# Patient Record
Sex: FEMALE | Race: WHITE | Employment: FULL TIME | URBAN - METROPOLITAN AREA
[De-identification: names, ages, dates, MRNs, and addresses within clinical notes are randomized per-mention and may not be internally consistent; named-entity substitution may affect disease eponyms.]

---

## 2019-10-17 ENCOUNTER — OFFICE VISIT (OUTPATIENT)
Dept: OBGYN CLINIC | Age: 24
End: 2019-10-17
Payer: COMMERCIAL

## 2019-10-17 VITALS
TEMPERATURE: 97.5 F | RESPIRATION RATE: 18 BRPM | WEIGHT: 255 LBS | DIASTOLIC BLOOD PRESSURE: 76 MMHG | SYSTOLIC BLOOD PRESSURE: 134 MMHG | HEIGHT: 57 IN | HEART RATE: 100 BPM | BODY MASS INDEX: 55.02 KG/M2

## 2019-10-17 DIAGNOSIS — E28.2 PCO (POLYCYSTIC OVARIES): ICD-10-CM

## 2019-10-17 DIAGNOSIS — N94.6 DYSMENORRHEA: Primary | ICD-10-CM

## 2019-10-17 DIAGNOSIS — N92.0 MENORRHAGIA WITH REGULAR CYCLE: ICD-10-CM

## 2019-10-17 DIAGNOSIS — R19.7 DIARRHEA, UNSPECIFIED TYPE: ICD-10-CM

## 2019-10-17 PROCEDURE — 99203 OFFICE O/P NEW LOW 30 MIN: CPT | Performed by: CLINICAL NURSE SPECIALIST

## 2019-10-17 RX ORDER — IBUPROFEN 800 MG/1
TABLET ORAL
Qty: 30 TABLET | Refills: 2 | Status: SHIPPED | OUTPATIENT
Start: 2019-10-17 | End: 2022-02-18 | Stop reason: ALTCHOICE

## 2019-10-17 ASSESSMENT — ENCOUNTER SYMPTOMS
DIARRHEA: 1
EYES NEGATIVE: 1
ABDOMINAL PAIN: 1
RESPIRATORY NEGATIVE: 1
ALLERGIC/IMMUNOLOGIC NEGATIVE: 1

## 2021-02-19 ENCOUNTER — HOSPITAL ENCOUNTER (OUTPATIENT)
Age: 26
Discharge: HOME | End: 2021-02-19

## 2021-02-19 PROCEDURE — 86480 TB TEST CELL IMMUN MEASURE: CPT

## 2021-02-19 PROCEDURE — 86787 VARICELLA-ZOSTER ANTIBODY: CPT

## 2021-02-19 PROCEDURE — 36415 COLL VENOUS BLD VENIPUNCTURE: CPT

## 2021-02-22 LAB
QUANTI TB GOLD PLUS: NEGATIVE
QUANTI TB1 MINUS NIL: 0 IU/ML (ref 0–0.34)
QUANTI TB2 MINUS NIL: 0 IU/ML (ref 0–0.34)
QUANTIFERON MITOGEN: 8.2 IU/ML
QUANTIFERON NIL: 0.03 IU/ML
VZV IGG SER QL IA: 1.18

## 2021-05-18 ENCOUNTER — HOSPITAL ENCOUNTER (OUTPATIENT)
Age: 26
Setting detail: SPECIMEN
Discharge: HOME | End: 2021-05-18

## 2021-05-18 LAB — HCG QUANTITATIVE: <1 IU/L

## 2021-10-19 ENCOUNTER — TELEPHONE (OUTPATIENT)
Dept: GASTROENTEROLOGY | Age: 26
End: 2021-10-19

## 2021-10-21 ENCOUNTER — OFFICE VISIT (OUTPATIENT)
Dept: OBGYN CLINIC | Age: 26
End: 2021-10-21
Payer: COMMERCIAL

## 2021-10-21 VITALS
BODY MASS INDEX: 56.52 KG/M2 | SYSTOLIC BLOOD PRESSURE: 130 MMHG | HEIGHT: 57 IN | WEIGHT: 262 LBS | DIASTOLIC BLOOD PRESSURE: 82 MMHG | HEART RATE: 93 BPM

## 2021-10-21 DIAGNOSIS — N97.9 PRIMARY FEMALE INFERTILITY: Primary | ICD-10-CM

## 2021-10-21 PROCEDURE — 99202 OFFICE O/P NEW SF 15 MIN: CPT | Performed by: OBSTETRICS & GYNECOLOGY

## 2021-10-21 NOTE — PROGRESS NOTES
aMrilu comes to the office today as a new visit patient to discuss fertility issues. She is  and states that she and her  have been trying to conceive unsuccessfully for the past 3 years. Many years ago she was diagnosed with PCO/IR syndrome and at 1 point was placed on Metformin. She has been on various forms of birth control from Depo-Provera, Nexplanon and the last 1 was a progesterone IUD which was removed 2 to 3 years ago. She is having menstrual cycles that are occurring 28 to 35 days and lasting 5 days. She was using an ovulation phone rosa. She does have an appointment with a fertility center in [place] next month. At this point she is unsure as to whether or not she will keep that appointment? We had a lengthy discussion regarding the accuracy of the diagnosis of PCO/IR since she is having cycles with varying intervals. We also discussed the inaccuracy of using a phone rosa because of that. Also discussed with the use of Metformin versus Clomid alone. Studies have shown that the pregnancy rate is superior with using Clomid versus Metformin and using Clomid independently. 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 After discussion she does desire to proceed with using Clomid and a prescription was sent to her pharmacy. She will follow-up in 3 to 4 months at her annual exam or as needed. Diego Schroeder MD,Mph, Oscar Carbajal.   -[facility]

## 2021-11-02 ENCOUNTER — TELEPHONE (OUTPATIENT)
Dept: OBGYN CLINIC | Age: 26
End: 2021-11-02

## 2021-11-03 NOTE — TELEPHONE ENCOUNTER
Patient called back stating that she started her period yesterday. However it is not like a normal period. It is very light with brown discharge. She is not sure if she should start taking the clomid now or wait until she has a heavy flow.  Patient asking for a call back with what she should do

## 2021-11-11 ENCOUNTER — TELEPHONE (OUTPATIENT)
Dept: OBGYN CLINIC | Age: 26
End: 2021-11-11

## 2021-11-11 NOTE — TELEPHONE ENCOUNTER
Called and stated she has cramping Her LMP was 11/03/21 and started  clomid on 11/06/21 for 5 days  told her that can be normal she may be trying to ovulate told her she can take tylenol for pain she stated she has fert.  Doctor appt next week told her to call back if things change

## 2021-11-17 ENCOUNTER — TELEPHONE (OUTPATIENT)
Dept: OBGYN CLINIC | Age: 26
End: 2021-11-17

## 2021-11-17 NOTE — TELEPHONE ENCOUNTER
Pt called she is on clomid and this month she is having cramping and pressure and had a positive ovulation test she just wants to know if this is normal it also hurts when she laughs

## 2022-01-16 NOTE — PROGRESS NOTES
[address] [facility] - [address] [address] [place] [facility]\"" [address]  Dept: [phone]  Dept Fax: [phone]    22    Chief Complaint   Patient presents with    Follow-up     Discuss PCOS and getting pregnant        Torie Pearson 23 y.o. is here to discuss fertility. She met with Dr Russell Osborne previously and was started on Clomid. She says that he told her she didn't have PCOS and she is confused and wanted to talk about what she has and what that means. She just wants to talk about the POC to try to conceive and have a clear path. She works for a bariatric surgeon and is going to talk to them about possibly a sleeve in the future. She is starting to have more regular periods with the clomid. She stopped taking it this month because it was causing pain in her ovaries. She has been following with Dr Teetee Rogers who said she has elevated testosterone, as well as acne, and agrees that she has PCOS. She has tried metformin in the past, but had stomach issues and stopped it. She did have irregular periods previously, but is having them monthly now. Review of Systems   Constitutional: Negative for chills and fever. HENT: Negative for congestion. Respiratory: Negative for cough and shortness of breath. Cardiovascular: Negative for chest pain and palpitations. Gastrointestinal: Negative for abdominal pain. Genitourinary: Negative for dyspareunia, pelvic pain and vaginal discharge. Musculoskeletal: Negative for back pain. Neurological: Negative for dizziness and light-headedness. Psychiatric/Behavioral: The patient is not nervous/anxious. Gynecologic History  Patient's last menstrual period was 2022.   Contraception: none  Last Pap: 19  Results: normal  Last Mammogram: n/a    Obstetric History  : 0  Para: 0  AB: 0    Past Medical History:   Diagnosis Date    HTN (hypertension)     PCOS (polycystic ovarian syndrome)      History reviewed. No pertinent surgical history. No Known Allergies  Current Outpatient Medications   Medication Sig Dispense Refill    Multiple Vitamin (MULTIVITAMIN ADULT PO) Take by mouth      clomiPHENE (CLOMID) 50 MG tablet Take 1 tablet by mouth daily Take 1 tablet by mouth daily starting day 3 of menses for 5 days 30 tablet 3    ibuprofen (ADVIL;MOTRIN) 800 MG tablet Begin 1 day before menses begins. Take one tablet every 8 hours for 5 days. (Patient not taking: Reported on 10/21/2021) 30 tablet 2     No current facility-administered medications for this visit. Social History     Socioeconomic History    Marital status:      Spouse name: Not on file    Number of children: Not on file    Years of education: Not on file    Highest education level: Not on file   Occupational History    Not on file   Tobacco Use    Smoking status: Never Smoker    Smokeless tobacco: Never Used   Substance and Sexual Activity    Alcohol use: Yes     Comment: rare    Drug use: Never    Sexual activity: Yes     Partners: Male     Birth control/protection: I.U.D. Other Topics Concern    Not on file   Social History Narrative    Not on file     Social Determinants of Health     Financial Resource Strain:     Difficulty of Paying Living Expenses: Not on file   Food Insecurity:     Worried About Running Out of Food in the Last Year: Not on file    Jose of Food in the Last Year: Not on file   Transportation Needs:     Lack of Transportation (Medical): Not on file    Lack of Transportation (Non-Medical):  Not on file   Physical Activity:     Days of Exercise per Week: Not on file    Minutes of Exercise per Session: Not on file   Stress:     Feeling of Stress : Not on file   Social Connections:     Frequency of Communication with Friends and Family: Not on file    Frequency of Social Gatherings with Friends and Family: Not on file    Attends Orthodox Services: Not on file    Active Member of Clubs or Organizations: Not on file    Attends Club or Organization Meetings: Not on file    Marital Status: Not on file   Intimate Partner Violence:     Fear of Current or Ex-Partner: Not on file    Emotionally Abused: Not on file    Physically Abused: Not on file    Sexually Abused: Not on file   Housing Stability:     Unable to Pay for Housing in the Last Year: Not on file    Number of [place] in the Last Year: Not on file    Unstable Housing in the Last Year: Not on file     History reviewed. No pertinent family history. Physical exam Physical Exam  Constitutional:       Appearance: Normal appearance. She is obese. HENT:      Head: Normocephalic. Eyes:      Extraocular Movements: Extraocular movements intact. Pulmonary:      Effort: Pulmonary effort is normal.   Neurological:      Mental Status: She is alert and oriented to person, place, and time. Psychiatric:         Mood and Affect: Mood normal.         Behavior: Behavior normal.         Thought Content: Thought content normal.         Judgment: Judgment normal.         Assessment/Plan  1. PCO (polycystic ovaries)  - Discussed diagnosis of PCOS based on oligo/amenorrhea, ultrasound findings and hyperandrogenism. Reviewed implications of the disease including increased risk for insulin resistance/DM, lipid abnormalities and cardiovascular disease, as well as GYN specific effects (possible infertility, oligomenorrhea, increased risk of endometrial cancer). Encouraged weight loss with diet and exercise. Reviewed medical management options for endometrial protection, including side effect profiles and dosing regimens. All questions answered. Patient desires to proceed with discussion with her bariatric surgeon at this time to decide what she would like to do.      2. Morbid obesity with BMI of 50.0-59.9, adult ([facility] [address]  - Encouraged pt to exercise and diet to lose weight to increase her chances of success with infertility management.       Pt to follow up in 6 months to [facility]

## 2022-01-17 ENCOUNTER — TELEPHONE (OUTPATIENT)
Dept: BARIATRICS/WEIGHT MGMT | Age: 27
End: 2022-01-17

## 2022-01-17 ENCOUNTER — OFFICE VISIT (OUTPATIENT)
Dept: OBGYN CLINIC | Age: 27
End: 2022-01-17
Payer: COMMERCIAL

## 2022-01-17 ENCOUNTER — VIRTUAL VISIT (OUTPATIENT)
Dept: GASTROENTEROLOGY | Age: 27
End: 2022-01-17
Payer: COMMERCIAL

## 2022-01-17 VITALS
HEIGHT: 58 IN | DIASTOLIC BLOOD PRESSURE: 89 MMHG | HEART RATE: 98 BPM | WEIGHT: 258 LBS | SYSTOLIC BLOOD PRESSURE: 134 MMHG | BODY MASS INDEX: 54.16 KG/M2

## 2022-01-17 DIAGNOSIS — R19.4 ALTERED BOWEL HABITS: Primary | ICD-10-CM

## 2022-01-17 DIAGNOSIS — R10.9 CHRONIC ABDOMINAL PAIN: ICD-10-CM

## 2022-01-17 DIAGNOSIS — E28.2 PCO (POLYCYSTIC OVARIES): Primary | ICD-10-CM

## 2022-01-17 DIAGNOSIS — G89.29 CHRONIC ABDOMINAL PAIN: ICD-10-CM

## 2022-01-17 DIAGNOSIS — E66.01 MORBID OBESITY WITH BMI OF 50.0-59.9, ADULT (HCC): ICD-10-CM

## 2022-01-17 DIAGNOSIS — R11.0 NAUSEA: ICD-10-CM

## 2022-01-17 PROCEDURE — 99422 OL DIG E/M SVC 11-20 MIN: CPT | Performed by: INTERNAL MEDICINE

## 2022-01-17 PROCEDURE — 99213 OFFICE O/P EST LOW 20 MIN: CPT | Performed by: OBSTETRICS & GYNECOLOGY

## 2022-01-17 RX ORDER — PANTOPRAZOLE SODIUM 40 MG/1
40 TABLET, DELAYED RELEASE ORAL
Qty: 30 TABLET | Refills: 5 | Status: SHIPPED | OUTPATIENT
Start: 2022-01-17 | End: 2022-11-03 | Stop reason: ALTCHOICE

## 2022-01-17 ASSESSMENT — ENCOUNTER SYMPTOMS
COUGH: 0
SHORTNESS OF BREATH: 0
ALLERGIC/IMMUNOLOGIC NEGATIVE: 1
CONSTIPATION: 1
ABDOMINAL DISTENTION: 1
ABDOMINAL PAIN: 1
ABDOMINAL PAIN: 0
BACK PAIN: 0
DIARRHEA: 1
RESPIRATORY NEGATIVE: 1
NAUSEA: 1

## 2022-01-17 NOTE — PROGRESS NOTES
Reason for Referral:   No referring provider defined for this encounter. Chief Complaint   Patient presents with    New Patient     nausea, abd pain, bloating            HISTORY OF PRESENT ILLNESS: Yasir Holman is a 23 y.o. female , referred for evaluation of abdominal pain. Nausea and vomiting. Altered bowel habits. She has had issues with this for several years. She underwent evaluation at [facility]. They were planning to do endoscopic evaluation due to insurance issues that could not happen. Over the past couple of months her symptoms have got worse. She reports cramping. Nausea. No vomiting. Altered bowel habits with some loose bowel movements. No clear triggers. She has been trying some dietary changes with minimal.  She reports family history of IBS. No colon cancer or IBD in the family. She reports no smoking or drinking. She is planning to go for bariatric surgery she reports no prior abdominal surgeries. Past Medical,Family, and Social History reviewed and does not contribute to the patient presenting condition. Patient's PMH/PSH,SH,PSYCH Hx, MEDs, ALLERGIES, and ROS were all reviewed and updated in the appropriate sections. PAST MEDICAL HISTORY:  Past Medical History:   Diagnosis Date    HTN (hypertension)     PCOS (polycystic ovarian syndrome)        History reviewed. No pertinent surgical history. CURRENT MEDICATIONS:    Current Outpatient Medications:     Multiple Vitamin (MULTIVITAMIN ADULT PO), Take by mouth, Disp: , Rfl:     clomiPHENE (CLOMID) 50 MG tablet, Take 1 tablet by mouth daily Take 1 tablet by mouth daily starting day 3 of menses for 5 days, Disp: 30 tablet, Rfl: 3    ibuprofen (ADVIL;MOTRIN) 800 MG tablet, Begin 1 day before menses begins. Take one tablet every 8 hours for 5 days. , Disp: 30 tablet, Rfl: 2    ALLERGIES:   No Known Allergies    FAMILY HISTORY: History reviewed. No pertinent family history.       SOCIAL HISTORY:   Social History     Socioeconomic History    Marital status:      Spouse name: Not on file    Number of children: Not on file    Years of education: Not on file    Highest education level: Not on file   Occupational History    Not on file   Tobacco Use    Smoking status: Never Smoker    Smokeless tobacco: Never Used   Substance and Sexual Activity    Alcohol use: Yes     Comment: rare    Drug use: Never    Sexual activity: Yes     Partners: Male     Birth control/protection: I.U.D. Other Topics Concern    Not on file   Social History Narrative    Not on file     Social Determinants of Health     Financial Resource Strain:     Difficulty of Paying Living Expenses: Not on file   Food Insecurity:     Worried About Running Out of Food in the Last Year: Not on file    Jose of Food in the Last Year: Not on file   Transportation Needs:     Lack of Transportation (Medical): Not on file    Lack of Transportation (Non-Medical):  Not on file   Physical Activity:     Days of Exercise per Week: Not on file    Minutes of Exercise per Session: Not on file   Stress:     Feeling of Stress : Not on file   Social Connections:     Frequency of Communication with Friends and Family: Not on file    Frequency of Social Gatherings with Friends and Family: Not on file    Attends Congregational Services: Not on file    Active Member of [address] [place] [address] [employer] or Organizations: Not on file    Attends Club or Organization Meetings: Not on file    Marital Status: Not on file   Intimate Partner Violence:     Fear of Current or Ex-Partner: Not on file    Emotionally Abused: Not on file    Physically Abused: Not on file    Sexually Abused: Not on file   Housing Stability:     Unable to Pay for Housing in the Last Year: Not on file    Number of [place] in the Last Year: Not on file    Unstable Housing in the Last Year: Not on file       REVIEW OF SYSTEMS: A 12-point review of systemswas obtained and pertinent positives and negatives were enumerated above in the history of present illness. All other reviewed systems / symptoms were negative. Review of Systems   Constitutional: Positive for appetite change. HENT: Negative. Eyes: Positive for visual disturbance (glasses ). Respiratory: Negative. Cardiovascular: Negative. Gastrointestinal: Positive for abdominal distention, abdominal pain, constipation, diarrhea and nausea. Endocrine: Negative. Genitourinary: Negative. Musculoskeletal: Negative. Skin: Negative. Allergic/Immunologic: Negative. Neurological: Negative. Hematological: Negative. Psychiatric/Behavioral: Negative. LABORATORY DATA: Reviewed  No results found for: WBC, HGB, HCT, MCV, PLT, NA, K, CL, CO2, BUN, CREATININE, LABPROT, LABALBU, GGT, BILITOT, ALKPHOS, AST, ALT, INR      No results found for: RBC, HGB, MCV, MCH, MCHC, RDW, MPV, BASOPCT, LYMPHSABS, MONOSABS, NEUTROABS, EOSABS, BASOSABS      DIAGNOSTIC TESTING:     No results found. [facility] 01/02/2022     PHYSICAL EXAMINATION: Vital signs reviewed per the nursing documentation. There is no height or weight on file to calculate BMI. Physical Exam      I personally reviewed the nurse's notes and documentation and I agree with her notes. Phone visit due to recent COVID infection    IMPRESSION: Ms. Bernardino Grover is a 23 y.o. female with altered bowel habits. Abdominal pain. Nausea. Very likely functional.  Trial of align and and IBgard. Dietary changes. Follow-up in 1 month. If no significant improvement she may benefit from colonoscopy with biopsies. She is planned for EGD by bariatric surgery prior to her possible sleeve gastrectomy. She would benefit from gastric and duodenal biopsies during that time. We will see her back in 1 month. Spent 30 minutes providing patient education and counseling. Thank you for allowing me to participate in the care of Ms. Fermin.  For any further questions please do not hesitate to contact me. I have reviewed and agree with the MA/LPN ROS. Note is dictated utilizing voice recognition software. Unfortunately this leads to occasional typographical errors. Please contact our office if you have any questions.     Gilles Boast, MD  [facility]  O: #[phone]

## 2022-01-17 NOTE — TELEPHONE ENCOUNTER
Online Info Session Completed:  on 1/10/22 okay to schedule with Dr. Jose Elias Pierre   with robi    Patient informed the following: This is NOT a guarantee of payment  When stating that you have a Benefit or Coverage for Bariatric Surgery - that means that you may qualify for the surgery  Bariatric Surgery is considered an elective procedure, patient is responsible to know their benefits . Any information we obtain when calling your insurance  is not  a guarantee of  coverage  and/or  benefit. Appointment Note :   New Patient , robi,   3   month visits,  PG Fee $200,  Mailed Packet or advised to arrive  early      Remind Patient of $200 Program fee with $ 100 required at Second visit with office on initial dietician visit. Remind Patient they must be nicotine free. They will be tested at the beginning of the program and prior to surgery. Advise Patient Responsible for out of pocket, copay at medical visits, Deductible and coinsurance applied to medical visits and procedure. You will be responsible for any of the following:  · Copays   · Deductibles 1000.00  · Co insurances 2000.00    The items mentioned above are indicated or required by your insurance plan. Your deductible and coinsurance are applied to medical visits and procedures. Verified with patient if he or she has had any previous bariatric surgery? no  ( If yes ,advise patient of transfer of care process and program fee)       .

## 2022-01-18 ENCOUNTER — OFFICE VISIT (OUTPATIENT)
Dept: BARIATRICS/WEIGHT MGMT | Age: 27
End: 2022-01-18
Payer: COMMERCIAL

## 2022-01-18 ENCOUNTER — TELEPHONE (OUTPATIENT)
Dept: GASTROENTEROLOGY | Age: 27
End: 2022-01-18

## 2022-01-18 ENCOUNTER — NURSE ONLY (OUTPATIENT)
Dept: BARIATRICS/WEIGHT MGMT | Age: 27
End: 2022-01-18

## 2022-01-18 VITALS
SYSTOLIC BLOOD PRESSURE: 160 MMHG | BODY MASS INDEX: 55.23 KG/M2 | HEART RATE: 94 BPM | DIASTOLIC BLOOD PRESSURE: 109 MMHG | HEIGHT: 57 IN | WEIGHT: 256 LBS

## 2022-01-18 DIAGNOSIS — R06.09 DYSPNEA ON EXERTION: ICD-10-CM

## 2022-01-18 DIAGNOSIS — K21.9 GASTROESOPHAGEAL REFLUX DISEASE WITHOUT ESOPHAGITIS: Primary | ICD-10-CM

## 2022-01-18 DIAGNOSIS — E66.01 MORBID OBESITY WITH BMI OF 50.0-59.9, ADULT (HCC): ICD-10-CM

## 2022-01-18 PROCEDURE — 99204 OFFICE O/P NEW MOD 45 MIN: CPT | Performed by: SURGERY

## 2022-01-18 NOTE — LETTER
Visit Date: 1/18/2022    Patient: Ronn Essex  YOB: 1995    Dear Dr. Ana Laura Hurt MD,      I had the pleasure of seeing Jamse Claros in the office today for a consult for weight loss surgery. Her current Weight: 256 lb (116.1 kg), which gives her a Body mass index is 55.4 kg/m². .  We had a long discussion regarding surgical options for weight loss and improvement in co-morbidities. She is considering a bariatric  procedure. We will start the preoperative workup, which includes bloodwork, psychological evaluation, support group attendance, and preoperative medical clearance from you. We will also initiate pre-certification for surgery, which requires a letter of medical necessity from you and often office notes documenting a weight history and co-morbidities. James Claros will require 3 months of medically supervised visits as specified by the patient's insurance. Thank you for allowing me to participate in the care of your patient. If you have any questions or concerns, please do not hesitate to call.       Sincerely,     Silvia Long DO  Director of Bariatric and Minimally Invasive Surgery  [facility]  [address] [facility] [address] [facility]  Monica Velardea: [phone]  F: [phone]

## 2022-01-19 NOTE — PROGRESS NOTES
Medical Nutrition Therapy  Initial Nutrition Assessment for Metabolic/ Bariatric Surgery  Required insurance visit prior to surgery:  3  Shared with patient the importance of documenting exercise and staying at or below start weight during visits. Zina Llamas is a 23 y.o. female with a date of birth of 1995. Weight History: Wt Readings from Last 3 Encounters:   01/18/22 256 lb (116.1 kg)   01/17/22 258 lb (117 kg)   10/21/21 262 lb (118.8 kg)        How does your weight affect your daily activities?fatigue      What would be different in your life if you felt healthier and fit? Why is that important to you now? I want to have a healthy pregnancy  Do you drink alcohol? . YES, occasionally    Do you use tobacco in the form of cigarettes, cigars, chew or any vapor appliance? No    Weight History      Siddharth Fermin's highest adult weight was 260 lbs at age 23. Patient was at her highest weight for 4 years. Patient's triggers/known causes to her highest weight are eating, pcos, poor diet, and eating out. Siddharth Fermin's lowest adult weight was 190 lbs at age 24. Patient was at her lowest weight for 1 years. The lowest weight was achieved through active at job, eating 2 meals a day and school/work. .     Physical Activity  Do you participate in a structured exercise program, step counting or regular physical activity? no      Instructions and exercise logs were provided to patient today see goal sheet and plan. Previous weight loss attempts  Patient has participated in the following weight loss programs:   [employer] and self directed calorie restriction      Nutrition History  Have you ever been diagnosed with an eating disorder? No  Have you ever had problems tolerating a multivitamin or mineral supplement?no  Have you ever been diagnosed with a vitamin or mineral deficiency? no     Patient dines out to a sit down restaurant 2 times per week.     Patient dines out to a fast food restaurant 1 times per week. Patient does have grazing. Patient does have night eating. Patient does have a history of emotional eating. Patient does have a history of  eating out of boredom. Drinks throughout the day: coffee, water, sweet tea and unsweetened tea    24 hour recall/food frequency: has been scanned into chart unless completed below. Assessment:  Nutritional Needs:  Men: 1500kcal daily minimum     Women 1200kcal daily minimum. 60-80gm of protein daily  PES Statement:  Obesity related to a complex combination of decreased energy needs, disordered eating patterns, physical inactivity, and increased psychological/life stress as evidenced by BMI greater than 30 and inability to maintain a significant amount of weight loss through conventional weight loss interventions. Goals    All goals were planned with and agreed on by the patient. I want to improve my health because I want to be healthier and get pregnant. appt # NA G What is your next step? C 1 2 3 4 5 6 7 8 9       1 I will read the education binder provided to me and the   x              0  2 I will make my pschological evaluation appoinment. 0  3 I will bring this goal card to every appointment. x 4 I will eliminate all tobacco/nicotine. x 5 I will limit alcoholic beverages to 0-7MO per week. x 6 I will limit dining out to 3 times per week or less. 7 I will eliminate sugary beverages. 8 I will eliminate carbonated beverages. 9 I will eliminate drinking with a straw. 10 I will limit caffeinated beverages to 16oz daily. 0  11 I will limit log my exercise daily. 12 I will determine my calcium and mvi plan.                  13 I will have 1-2 servings of lean protein present at each meal and minimeal.               0  14 I will eat every 3-5 hours. 0  15 I will drink 64oz of fluid daily. 16 I will eat slowly during meals and snacks. 17 I will limit fluids 4oz before after and during meals. 18 I will eat protein first at all meals followed by vegetables,  Fruit and lastly whole grains. 23 My first weight neutral approach is:                 20 My second weight neutral approach is:                 21  My Thirds weight neutral approach is:                 22                  23                  24                  25                  Goals reviewed with patient as below  Do you understand your goals? Do you have the information you need to achieve your goals? Do you have any questions  right now? Plan    Exercise for Health 15 easy exercises to do at home with 6 activity logs were provided to the patient with verbal and written instructions on how to carry this out. Goal number 14 was provided to the patient on this visit please see above. Will follow up each month and provide support as patient begins to add physical activity to life style. Monitor and review goals adjust as needed. Follow up monthly supervised diet and exercise.        Moo Carrillo, MS, RD, LD

## 2022-01-25 NOTE — PROGRESS NOTES
[address] [facility] INVASIVE BARIATRIC SURG  [address] [facility]  [address] [place] [id number]  Dept: [phone]    SURGICAL WEIGHT MANAGEMENT PROGRAM  PROGRESS NOTE INITIAL EVALUATION     Patient: Vanessa Sena        Service Date: 1/18/2022     HPI:     Chief Complaint   Patient presents with    Bariatric, Initial Visit    Weight Loss       The patient is a pleasant 23y.o. year old female  with morbid obesity, who stands Height: 4' 9\" (144.8 cm) tall with a weight of Weight: 256 lb (116.1 kg) , resulting in a BMI of Body mass index is 55.4 kg/m². . The patient suffers from multiple co-morbidities as a result of morbid obesity, including: Dyspnea on Exertion and GERD. She has suffered from obesity for many years. The patient denies  a history of myocardial infarction, deep vein thrombosis, pulmonary embolism, renal failure, hepatic failure and stroke. The patient has failed multiple attempts at non-surgical weight loss, and is now seeking surgical intervention to promote permanent and consistent weight loss. She  has chosen Sleeve Gastrectomy. She is well educated regarding it, as she has recently viewed our weight loss surgery informational seminar . Medical History:  Past Medical History:   Diagnosis Date    HTN (hypertension)     PCOS (polycystic ovarian syndrome)        Surgical History:  No past surgical history on file. Family History:  No family history on file.     Social History:   Social History     Tobacco Use    Smoking status: Never Smoker    Smokeless tobacco: Never Used   Substance Use Topics    Alcohol use: Yes     Comment: rare    Drug use: Never       Current Med List:  Current Outpatient Medications   Medication Sig Dispense Refill    pantoprazole (PROTONIX) 40 MG tablet Take 1 tablet by mouth every morning (before breakfast) 30 tablet 5    Multiple Vitamin (MULTIVITAMIN ADULT PO) Take by mouth      clomiPHENE (CLOMID) 50 MG tablet Take 1 tablet by mouth daily Take 1 tablet by mouth daily starting day 3 of menses for 5 days (Patient not taking: Reported on 1/18/2022) 30 tablet 3    ibuprofen (ADVIL;MOTRIN) 800 MG tablet Begin 1 day before menses begins. Take one tablet every 8 hours for 5 days. (Patient not taking: Reported on 1/18/2022) 30 tablet 2     No current facility-administered medications for this visit. No Known Allergies      SOCIAL:      This patient is alone for the evaluation today. [] HIV Risk Factors (i.e.) intravenous drug abuser; at risk sexual behavior; received blood products    [] TB Risk Factors (i.e.) Medically underserved, institutional care, foreign born, endemic area; exposure to active case    [] Hepatitis B&C Risk Factors (i.e.) Received blood transfusion prior to 1992; recreational drug use; high risk sexual behaviors; tattoos or body piercings; contact with blood or needle sticks in the workplace    Comprehension    Ability to grasp concepts and respond to questions:   [x] High   [] Medium   [] Low    Motivation    [x] Asks Questions; eager to learn   [] Needs education   [] Extreme anxiety    [] uncooperative   [] Denies need for education    English Speaking Ability    [x] Speaks English well   [x] Reads English well   [x] Understands spoken english    [x] Understands written English   [] No need for interpretive support      [] Might benefit from interpretive support   []  required for all services     REVIEW OF SYSTEMS: (Negative unless marked otherwise)     See review of Systems scanned into media    PRESENT ILLNESS:     Weight Parameters  Weight 256 lb (116.1 kg)   Height 4' 9\" (1.448 m)   BMI Body mass index is 55.4 kg/m².    IBW     EBW               IMMUNIZATION STATUS  Immunization History   Administered Date(s) Administered    COVID-19, Aurther Dyers, Primary or Immunocompromised, PF, 100mcg/0.5mL 12/30/2020, 05/06/2021       FALLS ASSESSMENT    [] LOW RISK FOR FALLS [] MODERATE RISK FOR FALLS    [] Difficulty walking/selfcare    [] Falls in the past 2 months    [] Suspicion of Clinician    [] Other:      SMOKING CESSATION     [] Not needed     [] Instructed to stop smoking    [] Pamphlet community resources given     VTE SCREEN    [] Family hx DVT/PE  /   [] Personal hx of DVT/PE    [x] Denies any family or personal hx of DVT/PE    Physician Review    [x] Past medical, family, & social history reviewed and discussed with patient. Review of surgery and post-surgical changes (by surgeon for surgical patients only)    [x] Lifelong diet expectations reviewed with patient    [x] Need for lifelong vitamin supplementation reviewed with patient    PHYSICAL EXAMINATION:      BP (!) 160/109   Pulse 94   Ht 4' 9\" (1.448 m)   Wt 256 lb (116.1 kg)   LMP 01/02/2022   BMI 55.40 kg/m²     Constitutional:  Vital signs are normal. The patient appears well-developed   HEENT:      Head: Normocephalic. Atraumatic     Eyes: pupils are equal and reactive. No scleral icterus is present. Neck: No mass and no thyromegaly present. Cardiovascular: Normal rate, regular rhythm, S1 normal and S2 normal.  Bilateral pulses present. Pulmonary/Chest: Effort normal and breath sounds normal. No retractions. Abdominal: Soft. Normal appearance. There is no organomegaly. No tenderness. There is no rigidity, no rebound, no guarding and no Hook's sign. Musculoskeletal:      Right lower leg: Normal. No tenderness and no edema. Left lower leg: Normal. No tenderness and no edema. Lymphadenopathy:     No cervical adenopathy, No Exrtemity Adenopathy. Neurological: The patient is alert and oriented. Moving all four extremities equally, sensation grossly intact bilateral.  Skin: Skin is warm, dry and intact. Psychiatric: The patient has a normal mood and affect.  Speech is normal and behavior is normal. Judgment and thought content normal. Cognition and memory are normal.     RECOMMENDATIONS: We spent a great deal of time discussing the risks and benefits of Sleeve Gastrectomy, including but not limited to injury to intra-abdominal organs, breakdown of the gastric staple line, the need for re-operative therapy,  prolonged hospitalization,  mechanical ventilation,  and death. We discussed the possibility of bleeding, the need for blood transfusions, blood clots, hospital-acquired and intra-abdominal infection, anastomotic stricture, and worsening GERD. And we discussed the need for post-operative visit compliance, behavior modifications and diet changes, protein and vitamin supplementation, as well as routine scheduled and dedicated exercise. I instructed the patient to utilize the exercise log that will be given to them at their fist dietician appointment. We discussed the potential weight loss benefit of approximately 50-60% of her excess body weight at 12-18 months post-op, as well as the possibility of insufficient weight loss or weight gain after 2 years post-operative time. Discussed the risk of substance abuse and or nicotine abuse today with patient. They expressed understanding of the risks of abuse of such drugs. PLAN:       Diagnosis Orders   1. Gastroesophageal reflux disease without esophagitis     2. Dyspnea on exertion     3. Morbid obesity with BMI of 50.0-59.9, adult (HCC)            Initial Testing     Primary Procedure: Sleeve Gastrectomy     Labwork: Initial Pre-surgical Lab Tests (CMP, TSH, Fasting Lipid Profile, Mg, Zinc, Vit B1 (whole blood), Vit B12, 25-OH Vit D, Fe,  Ferritin,  Folate) and Negative serum nicotine prior to submission for pre-auth to rule out nutritional deficiencies with BMI 55 and consideration of bariatric surgery    Endoscopic Studies: Upper GI Endoscopy for GERD and and being on PPI for over 1 year which has been untreated.     Psychological Assessment: Psychological Evaluation and Clearance to rule out eating disorder    Nutrition Assessment: Bariatric Nutrition Assessment and Clearance    Other  Consultations: Medical clearance with BMI 55    Physician Supervised Diet and Exercise required by the patients insurance company: 3 months.       Surgical Diet requirement:  2 weeks      Final Testing  Screening Chest Xray  and EKG within 6 months of date of surgery    Labwork:  Final Lab Tests  within 3 months of date of surgery (CBC, PT/PTT, BMP)     Electronically signed by Sai Scott DO on 1/24/2022 at 9:02 PM

## 2022-01-31 ENCOUNTER — TELEPHONE (OUTPATIENT)
Dept: OBGYN CLINIC | Age: 27
End: 2022-01-31

## 2022-01-31 NOTE — TELEPHONE ENCOUNTER
Marilu higuera, was told to call today after 1:00pm to see what  wants to do about her partners semen analysis. She says he had this done with the fertility center and there was a concern on if the specimen was adequate ir not.

## 2022-01-31 NOTE — TELEPHONE ENCOUNTER
Spoke to pt. Pt is aware of Husbands semen analysis. She is unsure if she is going to move forward with fertility clinic or her gastric sleeve surgery right now.

## 2022-02-17 ENCOUNTER — OFFICE VISIT (OUTPATIENT)
Dept: BARIATRICS/WEIGHT MGMT | Age: 27
End: 2022-02-17

## 2022-02-17 VITALS — RESPIRATION RATE: 20 BRPM | BODY MASS INDEX: 54.37 KG/M2 | WEIGHT: 252 LBS | HEIGHT: 57 IN

## 2022-02-17 DIAGNOSIS — E66.01 MORBID OBESITY WITH BMI OF 50.0-59.9, ADULT (HCC): ICD-10-CM

## 2022-02-17 DIAGNOSIS — K21.9 GASTROESOPHAGEAL REFLUX DISEASE WITHOUT ESOPHAGITIS: Primary | ICD-10-CM

## 2022-02-17 DIAGNOSIS — E28.2 PCO (POLYCYSTIC OVARIES): ICD-10-CM

## 2022-02-18 ENCOUNTER — OFFICE VISIT (OUTPATIENT)
Dept: BARIATRICS/WEIGHT MGMT | Age: 27
End: 2022-02-18
Payer: COMMERCIAL

## 2022-02-18 VITALS
WEIGHT: 252 LBS | DIASTOLIC BLOOD PRESSURE: 82 MMHG | SYSTOLIC BLOOD PRESSURE: 119 MMHG | RESPIRATION RATE: 20 BRPM | HEIGHT: 57 IN | BODY MASS INDEX: 54.37 KG/M2

## 2022-02-18 DIAGNOSIS — K21.9 GASTROESOPHAGEAL REFLUX DISEASE WITHOUT ESOPHAGITIS: Primary | ICD-10-CM

## 2022-02-18 DIAGNOSIS — R10.11 RIGHT UPPER QUADRANT PAIN: ICD-10-CM

## 2022-02-18 DIAGNOSIS — E66.01 MORBID OBESITY WITH BMI OF 50.0-59.9, ADULT (HCC): ICD-10-CM

## 2022-02-18 PROCEDURE — 99212 OFFICE O/P EST SF 10 MIN: CPT | Performed by: SURGERY

## 2022-02-18 NOTE — PROGRESS NOTES
Medical Nutrition Therapy   Metabolic and Bariatric Surgery         Supervised diet and exercise preparation  Visit 1 out of 3  Pt reports:  She would like to follow a lactose free and gluten free diet before and after surgery. She states she needs to work on fluid intake. Changes in eating patterns to promote health are noted below on the goals number 19-22    Vitals: Wt Readings from Last 3 Encounters:   02/18/22 252 lb (114.3 kg)   02/17/22 252 lb (114.3 kg)   01/18/22 256 lb (116.1 kg)         Nutrition Assessment:   PES: Knowledge deficit related to healthy behaviors that support weight management post weight loss surgery as evidenced by Body mass index is 54.53 kg/m². Nutrition Assessment of Goal Attainment:  TREATMENT GOALS:    1. Pt  Completed 2 out of 4 goals. 2.TREATMENT GOALS FOR UPCOMING WEEK: continue all previous goals and add: # 0    All goals were planned with and agreed on by the patient. Goals    All goals were planned with and agreed on by the patient. I want to improve my health because I want to be healthier and get pregnant. appt # NA G What is your next step? C 1 2 3 4 5 6 7 8 9       1 I will read the education binder provided to me and the   x              0  2 I will make my pschological evaluation appoinment. x 100             1  3 I will bring this goal card to every appointment. 0              x 4 I will eliminate all tobacco/nicotine. x 5 I will limit alcoholic beverages to 0-6ZW per week. x 6 I will limit dining out to 3 times per week or less. x 7 I will eliminate sugary beverages. x 8 I will eliminate carbonated beverages. 9 I will eliminate drinking with a straw. 10 I will limit caffeinated beverages to 16oz daily. 1  11 I will limit log my exercise daily. 100               12 I will determine my calcium and mvi plan.                  13 I will have 1-2 servings of lean protein present at each meal and minimeal.               1  14 I will eat every 3-5 hours. 70             1  15 I will drink 64oz of fluid daily. 70               16 I will eat slowly during meals and snacks. 17 I will limit fluids 4oz before after and during meals. 18 I will eat protein first at all meals followed by vegetables,  Fruit and lastly whole grains. 23 My first weight neutral approach is:                 20 My second weight neutral approach is:                 21  My Thirds weight neutral approach is:                 22                  23                  24                  25                    Questions asked for goal understanding:                                                  Do you understand your goals? Do you have the information you need to achieve your goals? Do you have any questions  right now? [x]  Consistent goal achievement in the program thus far and further success with goals is expected. []  Unable to consistently make progress in goal achievement. At this time patient is not moving forward  in developing the skills needed for success after surgery. Plan:    Continue to follow monthly and review goals.          [x]  Nutrition visits complete    []

## 2022-02-22 ENCOUNTER — HOSPITAL ENCOUNTER (OUTPATIENT)
Dept: ULTRASOUND IMAGING | Age: 27
Discharge: HOME | End: 2022-02-24
Payer: COMMERCIAL

## 2022-02-22 ENCOUNTER — HOSPITAL ENCOUNTER (OUTPATIENT)
Age: 27
Discharge: HOME | End: 2022-02-22
Payer: COMMERCIAL

## 2022-02-22 DIAGNOSIS — R10.11 RIGHT UPPER QUADRANT PAIN: ICD-10-CM

## 2022-02-22 DIAGNOSIS — E66.01 MORBID OBESITY WITH BMI OF 50.0-59.9, ADULT (HCC): ICD-10-CM

## 2022-02-22 LAB
ABSOLUTE EOS #: 0.09 K/UL (ref 0–0.44)
ABSOLUTE IMMATURE GRANULOCYTE: 0.03 K/UL (ref 0–0.3)
ABSOLUTE LYMPH #: 1.71 K/UL (ref 1.1–3.7)
ABSOLUTE MONO #: 0.56 K/UL (ref 0.1–1.2)
ALBUMIN SERPL-MCNC: 4.2 G/DL (ref 3.5–5.2)
ALBUMIN/GLOBULIN RATIO: 1.6 (ref 1–2.5)
ALP BLD-CCNC: 94 U/L (ref 35–104)
ALT SERPL-CCNC: 18 U/L (ref 5–33)
ANION GAP SERPL CALCULATED.3IONS-SCNC: 15 MMOL/L (ref 9–17)
AST SERPL-CCNC: 16 U/L
BASOPHILS # BLD: 0 % (ref 0–2)
BASOPHILS ABSOLUTE: 0.03 K/UL (ref 0–0.2)
BILIRUB SERPL-MCNC: 0.54 MG/DL (ref 0.3–1.2)
BUN BLDV-MCNC: 21 MG/DL (ref 6–20)
CALCIUM SERPL-MCNC: 9 MG/DL (ref 8.6–10.4)
CHLORIDE BLD-SCNC: 106 MMOL/L (ref 98–107)
CHOLESTEROL/HDL RATIO: 3.2
CHOLESTEROL: 142 MG/DL
CO2: 19 MMOL/L (ref 20–31)
CREAT SERPL-MCNC: 0.47 MG/DL (ref 0.5–0.9)
EOSINOPHILS RELATIVE PERCENT: 1 % (ref 1–4)
ESTIMATED AVERAGE GLUCOSE: 97 MG/DL
FERRITIN: 62 UG/L (ref 13–150)
FOLATE: 8 NG/ML
GFR AFRICAN AMERICAN: >60 ML/MIN
GFR NON-AFRICAN AMERICAN: >60 ML/MIN
GFR SERPL CREATININE-BSD FRML MDRD: ABNORMAL ML/MIN/{1.73_M2}
GLUCOSE BLD-MCNC: 84 MG/DL (ref 70–99)
HBA1C MFR BLD: 5 % (ref 4–6)
HCT VFR BLD CALC: 41 % (ref 36.3–47.1)
HDLC SERPL-MCNC: 44 MG/DL
HEMOGLOBIN: 13 G/DL (ref 11.9–15.1)
IMMATURE GRANULOCYTES: 0 %
IRON SATURATION: 16 % (ref 20–55)
IRON: 58 UG/DL (ref 37–145)
LDL CHOLESTEROL: 87 MG/DL (ref 0–130)
LYMPHOCYTES # BLD: 21 % (ref 24–43)
MAGNESIUM: 2.1 MG/DL (ref 1.6–2.6)
MCH RBC QN AUTO: 27.2 PG (ref 25.2–33.5)
MCHC RBC AUTO-ENTMCNC: 31.7 G/DL (ref 28.4–34.8)
MCV RBC AUTO: 85.8 FL (ref 82.6–102.9)
MONOCYTES # BLD: 7 % (ref 3–12)
NRBC AUTOMATED: 0 PER 100 WBC
PDW BLD-RTO: 13.4 % (ref 11.8–14.4)
PLATELET # BLD: 316 K/UL (ref 138–453)
PMV BLD AUTO: 10.5 FL (ref 8.1–13.5)
POTASSIUM SERPL-SCNC: 4.5 MMOL/L (ref 3.7–5.3)
PTH INTACT: 44.45 PG/ML (ref 15–65)
RBC # BLD: 4.78 M/UL (ref 3.95–5.11)
SEG NEUTROPHILS: 71 % (ref 36–65)
SEGMENTED NEUTROPHILS ABSOLUTE COUNT: 5.92 K/UL (ref 1.5–8.1)
SODIUM BLD-SCNC: 140 MMOL/L (ref 135–144)
TOTAL IRON BINDING CAPACITY: 365 UG/DL (ref 250–450)
TOTAL PROTEIN: 6.8 G/DL (ref 6.4–8.3)
TRIGL SERPL-MCNC: 53 MG/DL
TSH SERPL DL<=0.05 MIU/L-ACNC: 2.91 MIU/L (ref 0.3–5)
UNSATURATED IRON BINDING CAPACITY: 307 UG/DL (ref 112–347)
VITAMIN B-12: 338 PG/ML (ref 232–1245)
VITAMIN D 25-HYDROXY: 18.3 NG/ML (ref 30–100)
WBC # BLD: 8.3 K/UL (ref 3.5–11.3)

## 2022-02-22 PROCEDURE — 83735 ASSAY OF MAGNESIUM: CPT

## 2022-02-22 PROCEDURE — 83036 HEMOGLOBIN GLYCOSYLATED A1C: CPT

## 2022-02-22 PROCEDURE — 82607 VITAMIN B-12: CPT

## 2022-02-22 PROCEDURE — 83970 ASSAY OF PARATHORMONE: CPT

## 2022-02-22 PROCEDURE — 80061 LIPID PANEL: CPT

## 2022-02-22 PROCEDURE — 76705 ECHO EXAM OF ABDOMEN: CPT

## 2022-02-22 PROCEDURE — 82306 VITAMIN D 25 HYDROXY: CPT

## 2022-02-22 PROCEDURE — 82728 ASSAY OF FERRITIN: CPT

## 2022-02-22 PROCEDURE — 84590 ASSAY OF VITAMIN A: CPT

## 2022-02-22 PROCEDURE — 80053 COMPREHEN METABOLIC PANEL: CPT

## 2022-02-22 PROCEDURE — 85025 COMPLETE CBC W/AUTO DIFF WBC: CPT

## 2022-02-22 PROCEDURE — 84425 ASSAY OF VITAMIN B-1: CPT

## 2022-02-22 PROCEDURE — 84630 ASSAY OF ZINC: CPT

## 2022-02-22 PROCEDURE — 83550 IRON BINDING TEST: CPT

## 2022-02-22 PROCEDURE — 84443 ASSAY THYROID STIM HORMONE: CPT

## 2022-02-22 PROCEDURE — 82746 ASSAY OF FOLIC ACID SERUM: CPT

## 2022-02-22 PROCEDURE — 36415 COLL VENOUS BLD VENIPUNCTURE: CPT

## 2022-02-22 PROCEDURE — 83540 ASSAY OF IRON: CPT

## 2022-02-25 LAB
RETINYL PALMITATE: <0.02 MG/L (ref 0–0.1)
VITAMIN A LEVEL: 0.51 MG/L (ref 0.3–1.2)
VITAMIN A, INTERP: NORMAL
ZINC: 83.1 UG/DL (ref 60–120)

## 2022-02-25 NOTE — PROGRESS NOTES
Incisional     [x] Rash   [x] Hair Loss  [] Other:     Positive for: [] Wound:   [] Open   [] Draining    [] Incisional  [] Rash   [] Hair Loss  [] Other:        Physical Exam:  /82 (Site: Left Upper Arm, Position: Sitting, Cuff Size: Large Adult)   Resp 20   Ht 4' 9\" (1.448 m)   Wt 252 lb (114.3 kg)   LMP 01/11/2022 (Approximate)   BMI 54.53 kg/m²   Constitutional:  Vital signs are normal. The patient appears well-developed and well-nourished. HEENT:   Head: Normocephalic. Atraumatic  Eyes: pupils are equal and reactive. No scleral icterus is present. Neck: No mass and no thyromegaly present. Cardiovascular: Normal rate, regular rhythm, S1 normal and S2 normal.  Radial pulses present   Pulmonary/Chest: Effort normal and breath sounds normal. No retractions  Abdominal: Soft. Normal appearance. There is no organomegaly. No tenderness. There is no rigidity, no rebound, no guarding and no Hook's sign. Musculoskeletal:        Right lower leg: Normal. No tenderness and no edema. Left lower leg: Normal. No tenderness and no edema. Lymphadenopathy:     No cervical adenopathy, No Exrtemity Adenopathy. Neurological: The patient is alert and oriented. Moving all 4 extremities, sensation grossly intact bilateral  Skin: Skin is warm, dry and intact. Psychiatric: The patient has a normal mood and affect.  Speech is normal and behavior is normal. Judgment and thought content normal. Cognition and memory are normal.     Assessment:  Morbid obesity, BMI 54  Right upper quadrant pain    Plan:  Dietician visit  Check labs  Psychology visit  She is actively working on her [place] for EGD

## 2022-02-27 LAB — VITAMIN B1 WHOLE BLOOD: 58 NMOL/L (ref 70–180)

## 2022-03-02 DIAGNOSIS — E55.9 VITAMIN D DEFICIENCY: Primary | ICD-10-CM

## 2022-03-02 RX ORDER — ERGOCALCIFEROL 1.25 MG/1
50000 CAPSULE ORAL WEEKLY
Qty: 8 CAPSULE | Refills: 0 | Status: SHIPPED | OUTPATIENT
Start: 2022-03-02 | End: 2022-11-03 | Stop reason: ALTCHOICE

## 2022-03-03 ENCOUNTER — ANESTHESIA EVENT (OUTPATIENT)
Dept: OPERATING ROOM | Age: 27
End: 2022-03-03
Payer: COMMERCIAL

## 2022-03-04 ENCOUNTER — HOSPITAL ENCOUNTER (OUTPATIENT)
Age: 27
Setting detail: OUTPATIENT SURGERY
Discharge: HOME | End: 2022-03-04
Attending: SURGERY | Admitting: SURGERY
Payer: COMMERCIAL

## 2022-03-04 ENCOUNTER — ANESTHESIA (OUTPATIENT)
Dept: OPERATING ROOM | Age: 27
End: 2022-03-04
Payer: COMMERCIAL

## 2022-03-04 VITALS — SYSTOLIC BLOOD PRESSURE: 143 MMHG | OXYGEN SATURATION: 98 % | DIASTOLIC BLOOD PRESSURE: 66 MMHG

## 2022-03-04 VITALS
DIASTOLIC BLOOD PRESSURE: 92 MMHG | BODY MASS INDEX: 54.37 KG/M2 | SYSTOLIC BLOOD PRESSURE: 135 MMHG | OXYGEN SATURATION: 99 % | HEART RATE: 88 BPM | RESPIRATION RATE: 26 BRPM | TEMPERATURE: 96.4 F | HEIGHT: 57 IN | WEIGHT: 252 LBS

## 2022-03-04 LAB — HCG, PREGNANCY URINE (POC): NEGATIVE

## 2022-03-04 PROCEDURE — 6360000002 HC RX W HCPCS: Performed by: ANESTHESIOLOGY

## 2022-03-04 PROCEDURE — 3700000000 HC ANESTHESIA ATTENDED CARE: Performed by: SURGERY

## 2022-03-04 PROCEDURE — 2500000003 HC RX 250 WO HCPCS: Performed by: ANESTHESIOLOGY

## 2022-03-04 PROCEDURE — 2580000003 HC RX 258: Performed by: ANESTHESIOLOGY

## 2022-03-04 PROCEDURE — 7100000011 HC PHASE II RECOVERY - ADDTL 15 MIN: Performed by: SURGERY

## 2022-03-04 PROCEDURE — 7100000010 HC PHASE II RECOVERY - FIRST 15 MIN: Performed by: SURGERY

## 2022-03-04 PROCEDURE — 81025 URINE PREGNANCY TEST: CPT

## 2022-03-04 PROCEDURE — 2709999900 HC NON-CHARGEABLE SUPPLY: Performed by: SURGERY

## 2022-03-04 PROCEDURE — 88305 TISSUE EXAM BY PATHOLOGIST: CPT

## 2022-03-04 PROCEDURE — 3609012400 HC EGD TRANSORAL BIOPSY SINGLE/MULTIPLE: Performed by: SURGERY

## 2022-03-04 PROCEDURE — 43239 EGD BIOPSY SINGLE/MULTIPLE: CPT | Performed by: SURGERY

## 2022-03-04 RX ORDER — SODIUM CHLORIDE 9 MG/ML
INJECTION, SOLUTION INTRAVENOUS CONTINUOUS
Status: DISCONTINUED | OUTPATIENT
Start: 2022-03-04 | End: 2022-03-04 | Stop reason: HOSPADM

## 2022-03-04 RX ORDER — PROPOFOL 10 MG/ML
INJECTION, EMULSION INTRAVENOUS PRN
Status: DISCONTINUED | OUTPATIENT
Start: 2022-03-04 | End: 2022-03-04 | Stop reason: SDUPTHER

## 2022-03-04 RX ORDER — SODIUM CHLORIDE 0.9 % (FLUSH) 0.9 %
5-40 SYRINGE (ML) INJECTION EVERY 12 HOURS SCHEDULED
Status: DISCONTINUED | OUTPATIENT
Start: 2022-03-04 | End: 2022-03-04 | Stop reason: HOSPADM

## 2022-03-04 RX ORDER — SODIUM CHLORIDE 9 MG/ML
25 INJECTION, SOLUTION INTRAVENOUS PRN
Status: DISCONTINUED | OUTPATIENT
Start: 2022-03-04 | End: 2022-03-04 | Stop reason: HOSPADM

## 2022-03-04 RX ORDER — OXYCODONE HYDROCHLORIDE AND ACETAMINOPHEN 5; 325 MG/1; MG/1
2 TABLET ORAL
Status: DISCONTINUED | OUTPATIENT
Start: 2022-03-04 | End: 2022-03-04 | Stop reason: HOSPADM

## 2022-03-04 RX ORDER — MORPHINE SULFATE 2 MG/ML
2 INJECTION, SOLUTION INTRAMUSCULAR; INTRAVENOUS EVERY 5 MIN PRN
Status: DISCONTINUED | OUTPATIENT
Start: 2022-03-04 | End: 2022-03-04 | Stop reason: HOSPADM

## 2022-03-04 RX ORDER — SODIUM CHLORIDE, SODIUM LACTATE, POTASSIUM CHLORIDE, CALCIUM CHLORIDE 600; 310; 30; 20 MG/100ML; MG/100ML; MG/100ML; MG/100ML
INJECTION, SOLUTION INTRAVENOUS CONTINUOUS
Status: DISCONTINUED | OUTPATIENT
Start: 2022-03-04 | End: 2022-03-04 | Stop reason: HOSPADM

## 2022-03-04 RX ORDER — ONDANSETRON 2 MG/ML
4 INJECTION INTRAMUSCULAR; INTRAVENOUS
Status: DISCONTINUED | OUTPATIENT
Start: 2022-03-04 | End: 2022-03-04 | Stop reason: HOSPADM

## 2022-03-04 RX ORDER — MIDAZOLAM HYDROCHLORIDE 2 MG/2ML
2 INJECTION, SOLUTION INTRAMUSCULAR; INTRAVENOUS
Status: DISCONTINUED | OUTPATIENT
Start: 2022-03-04 | End: 2022-03-04 | Stop reason: HOSPADM

## 2022-03-04 RX ORDER — LABETALOL 20 MG/4 ML (5 MG/ML) INTRAVENOUS SYRINGE
10
Status: DISCONTINUED | OUTPATIENT
Start: 2022-03-04 | End: 2022-03-04 | Stop reason: HOSPADM

## 2022-03-04 RX ORDER — PROMETHAZINE HYDROCHLORIDE 25 MG/ML
6.25 INJECTION, SOLUTION INTRAMUSCULAR; INTRAVENOUS EVERY 5 MIN PRN
Status: DISCONTINUED | OUTPATIENT
Start: 2022-03-04 | End: 2022-03-04

## 2022-03-04 RX ORDER — SODIUM CHLORIDE 0.9 % (FLUSH) 0.9 %
10 SYRINGE (ML) INJECTION PRN
Status: DISCONTINUED | OUTPATIENT
Start: 2022-03-04 | End: 2022-03-04 | Stop reason: HOSPADM

## 2022-03-04 RX ORDER — DIPHENHYDRAMINE HYDROCHLORIDE 50 MG/ML
12.5 INJECTION INTRAMUSCULAR; INTRAVENOUS
Status: DISCONTINUED | OUTPATIENT
Start: 2022-03-04 | End: 2022-03-04 | Stop reason: HOSPADM

## 2022-03-04 RX ORDER — PROCHLORPERAZINE EDISYLATE 5 MG/ML
10 INJECTION INTRAMUSCULAR; INTRAVENOUS EVERY 6 HOURS PRN
Status: DISCONTINUED | OUTPATIENT
Start: 2022-03-04 | End: 2022-03-04 | Stop reason: HOSPADM

## 2022-03-04 RX ORDER — OXYCODONE HYDROCHLORIDE AND ACETAMINOPHEN 5; 325 MG/1; MG/1
1 TABLET ORAL
Status: DISCONTINUED | OUTPATIENT
Start: 2022-03-04 | End: 2022-03-04 | Stop reason: HOSPADM

## 2022-03-04 RX ORDER — LIDOCAINE HYDROCHLORIDE 10 MG/ML
INJECTION, SOLUTION EPIDURAL; INFILTRATION; INTRACAUDAL; PERINEURAL PRN
Status: DISCONTINUED | OUTPATIENT
Start: 2022-03-04 | End: 2022-03-04 | Stop reason: SDUPTHER

## 2022-03-04 RX ORDER — MEPERIDINE HYDROCHLORIDE 50 MG/ML
12.5 INJECTION INTRAMUSCULAR; INTRAVENOUS; SUBCUTANEOUS EVERY 5 MIN PRN
Status: DISCONTINUED | OUTPATIENT
Start: 2022-03-04 | End: 2022-03-04 | Stop reason: HOSPADM

## 2022-03-04 RX ORDER — IPRATROPIUM BROMIDE AND ALBUTEROL SULFATE 2.5; .5 MG/3ML; MG/3ML
1 SOLUTION RESPIRATORY (INHALATION)
Status: DISCONTINUED | OUTPATIENT
Start: 2022-03-04 | End: 2022-03-04 | Stop reason: HOSPADM

## 2022-03-04 RX ORDER — SODIUM CHLORIDE 0.9 % (FLUSH) 0.9 %
10 SYRINGE (ML) INJECTION EVERY 12 HOURS SCHEDULED
Status: DISCONTINUED | OUTPATIENT
Start: 2022-03-04 | End: 2022-03-04 | Stop reason: HOSPADM

## 2022-03-04 RX ORDER — SODIUM CHLORIDE 0.9 % (FLUSH) 0.9 %
5-40 SYRINGE (ML) INJECTION PRN
Status: DISCONTINUED | OUTPATIENT
Start: 2022-03-04 | End: 2022-03-04 | Stop reason: HOSPADM

## 2022-03-04 RX ADMIN — PROPOFOL 150 MG: 10 INJECTION, EMULSION INTRAVENOUS at 07:48

## 2022-03-04 RX ADMIN — LIDOCAINE HYDROCHLORIDE 50 MG: 10 INJECTION, SOLUTION EPIDURAL; INFILTRATION; INTRACAUDAL; PERINEURAL at 07:48

## 2022-03-04 RX ADMIN — PROPOFOL 50 MG: 10 INJECTION, EMULSION INTRAVENOUS at 07:57

## 2022-03-04 RX ADMIN — PROPOFOL 100 MG: 10 INJECTION, EMULSION INTRAVENOUS at 07:50

## 2022-03-04 RX ADMIN — PROPOFOL 50 MG: 10 INJECTION, EMULSION INTRAVENOUS at 07:53

## 2022-03-04 RX ADMIN — SODIUM CHLORIDE, POTASSIUM CHLORIDE, SODIUM LACTATE AND CALCIUM CHLORIDE: 600; 310; 30; 20 INJECTION, SOLUTION INTRAVENOUS at 07:05

## 2022-03-04 ASSESSMENT — PULMONARY FUNCTION TESTS
PIF_VALUE: 0

## 2022-03-04 ASSESSMENT — PAIN SCALES - GENERAL
PAINLEVEL_OUTOF10: 0
PAINLEVEL_OUTOF10: 0

## 2022-03-04 ASSESSMENT — PAIN - FUNCTIONAL ASSESSMENT: PAIN_FUNCTIONAL_ASSESSMENT: 0-10

## 2022-03-04 NOTE — OP NOTE
[facility]  [facility] [place]  [address] [facility] [address]  Dept: [phone]  Loc: [phone]      Preoperative Diagnosis:  GERD, Morbid obesity, BMI of Body mass index is 54.53 kg/m². Postoperative Diagnosis: GERD without esophagitis, Morbid obesity, BMI of Body mass index is 54.53 kg/m². Procedure: Esophagogastroduodenoscopy with Biopsy    Surgeon: Matt Acevedo DO    Assistant:    Anesthesia: MAC, see anesthesia records    Specimen:    1) Antrum for H. Pylori      Findings:  No hiatal hernia, normal esophagus    EBL: NONE    Operative Narrative: The risks and benefits were explained in detail to the patient who agreed and consented to the procedure. The patient was taken to the endoscopic suite and placed in a lateral position. Oxygen was administered via nasal cannula and a mouth guard was placed. MAC was administered via the anesthetic team.      The endoscope was then advanced into the oropharynx and down into the esophagus under direct visualization. The scope was further advanced through the esophagus, GE junction and stomach to the pylorus under visualization. The scope was passed through the pylorus and duodenal sweep performed, advancing and visualizing to the second portion of the duodenum. The scope was then withdrawn to the antrum and cold forceps were used to take biopsies of the antrum for H. Pylori. Appropriate hemostasis was noted. The scope was then retroflexed to visualize the GE junction. Evidence of a hiatal hernia was not noted. The scope was then slowly withdrawn through the GE junction. The Z line was noted. The stomach was then decompressed. The scope was withdrawn from the esophagus and no further lesions noted. The scope was withdrawn. The patient tolerated the procedure well. PPI. She will follow up with the Bariatric Clinic for further assessment.

## 2022-03-04 NOTE — H&P
Subjective     The patient is a 23 y.o. female who is here to undergo EGD for GERD. Body mass index is 54.53 kg/m². They are considering a bariatric procedure for morbid obesity and need evaluation of GERD       Past Medical History:   Diagnosis Date    HTN (hypertension)     does not take meds    Obesity     PCOS (polycystic ovarian syndrome)    . Review of Systems - A complete 14 point review of systems was performed. All was negative unless otherwise documented in HPI. Allergies:  No Known Allergies    Past Surgical History:  History reviewed. No pertinent surgical history. Family History:  History reviewed. No pertinent family history. Social History:  Social History     Socioeconomic History    Marital status:      Spouse name: Not on file    Number of children: Not on file    Years of education: Not on file    Highest education level: Not on file   Occupational History    Not on file   Tobacco Use    Smoking status: Never Smoker    Smokeless tobacco: Never Used   Substance and Sexual Activity    Alcohol use: Yes     Comment: rare    Drug use: Never    Sexual activity: Yes     Partners: Male     Birth control/protection: I.U.D. Other Topics Concern    Not on file   Social History Narrative    Not on file     Social Determinants of Health     Financial Resource Strain:     Difficulty of Paying Living Expenses: Not on file   Food Insecurity:     Worried About Running Out of Food in the Last Year: Not on file    Jose of Food in the Last Year: Not on file   Transportation Needs:     Lack of Transportation (Medical): Not on file    Lack of Transportation (Non-Medical):  Not on file   Physical Activity:     Days of Exercise per Week: Not on file    Minutes of Exercise per Session: Not on file   Stress:     Feeling of Stress : Not on file   Social Connections:     Frequency of Communication with Friends and Family: Not on file    Frequency of Social Gatherings with Friends and Family: Not on file    Attends [employer] Services: Not on file    Active Member of Clubs or Organizations: Not on file    Attends Club or Organization Meetings: Not on file    Marital Status: Not on file   Intimate Partner Violence:     Fear of Current or Ex-Partner: Not on file    Emotionally Abused: Not on file    Physically Abused: Not on file    Sexually Abused: Not on file   Housing Stability:     Unable to Pay for Housing in the Last Year: Not on file    Number of [place] in the Last Year: Not on file    Unstable Housing in the Last Year: Not on file       Current Facility-Administered Medications   Medication Dose Route Frequency Provider Last Rate Last Admin    0.9 % sodium chloride infusion   IntraVENous Continuous Julien Miles MD        lactated ringers infusion   IntraVENous Continuous Julien Miles  mL/hr at 03/04/22 0705 New Bag at 03/04/22 0705    sodium chloride flush 0.9 % injection 10 mL  10 mL IntraVENous 2 times per day Julien Miles MD        sodium chloride flush 0.9 % injection 10 mL  10 mL IntraVENous PRN Julien Miles MD        0.9 % sodium chloride infusion  25 mL IntraVENous PRN Julien Miles MD           Objective      Physical Exam  /85   Pulse 86   Temp 98.4 °F (36.9 °C) (Infrared)   Resp 16   Ht 4' 9\" (1.448 m)   Wt 252 lb (114.3 kg)   SpO2 96%   BMI 54.53 kg/m²    Constitutional:  Vital signs are normal. The patient appears well-developed and well-nourished. HEENT:   Head: Normocephalic. Atraumatic  Eyes: pupils are equal and reactive. No scleral icterus is present. Neck: No mass and no thyromegaly present. Cardiovascular: Normal rate, regular rhythm, S1 normal and S2 normal.    Pulmonary/Chest: Effort normal and breath sounds normal.   Abdominal: Soft. Normal appearance. There is no organomegaly. No tenderness. There is no rigidity, no rebound, no guarding and no Hook's sign.    Musculoskeletal:        Right lower leg: Normal. No tenderness and no edema. Left lower leg: Normal. No tenderness and no edema. Lymphadenopathy:     No cervical adenopathy, No Exrtemity Adenopathy. Neurological: The patient is alert and oriented. Skin: Skin is warm, dry and intact. Psychiatric: The patient has a normal mood and affect.  Speech is normal and behavior is normal. Judgment and thought content normal. Cognition and memory are normal.     Assessment     Patient Active Problem List   Diagnosis    PCO (polycystic ovaries)    Morbid obesity with BMI of 50.0-59.9, adult ([facility] [address]       Plan   EGD

## 2022-03-04 NOTE — ANESTHESIA PRE PROCEDURE
Department of Anesthesiology  Preprocedure Note       Name:  Shameka Ennis   Age:  23 y.o.  :  1995                                          MRN:  [id number]         Date:  3/4/2022      Surgeon: Brain Young):  Nevaeh Alvarez DO    Procedure: Procedure(s):  EGD BIOPSY    Medications prior to admission:   Prior to Admission medications    Medication Sig Start Date End Date Taking? Authorizing Provider   Multiple Vitamin (MULTIVITAMIN ADULT PO) Take by mouth   Yes Historical Provider, MD   vitamin D (ERGOCALCIFEROL) 1.25 MG (49227 UT) CAPS capsule Take 1 capsule by mouth once a week for 8 doses 3/2/22 4/21/22  Pema Pouch, APRN - CNP   pantoprazole (PROTONIX) 40 MG tablet Take 1 tablet by mouth every morning (before breakfast)  Patient not taking: Reported on 2022   John Mclaughlin MD       Current medications:    Current Facility-Administered Medications   Medication Dose Route Frequency Provider Last Rate Last Admin    0.9 % sodium chloride infusion   IntraVENous Continuous Mitchell Goldstein MD        lactated ringers infusion   IntraVENous Continuous Mitchell Goldstein  mL/hr at 22 0705 New Bag at 22 0705    sodium chloride flush 0.9 % injection 10 mL  10 mL IntraVENous 2 times per day Mitchell Goldstein MD        sodium chloride flush 0.9 % injection 10 mL  10 mL IntraVENous PRN Mitchell Goldstein MD        0.9 % sodium chloride infusion  25 mL IntraVENous PRN Mitchell Goldstein MD           Allergies:  No Known Allergies    Problem List:    Patient Active Problem List   Diagnosis Code    PCO (polycystic ovaries) E28.2    Morbid obesity with BMI of 50.0-59.9, adult ([facility] E66.01, Z68.43       Past Medical History:        Diagnosis Date    HTN (hypertension)     does not take meds    Obesity     PCOS (polycystic ovarian syndrome)        Past Surgical History:  History reviewed. No pertinent surgical history.     Social History:    Social History     Tobacco Use    Smoking status: Never Smoker    Smokeless tobacco: Never Used   Substance Use Topics    Alcohol use: Yes     Comment: rare                                Counseling given: Not Answered      Vital Signs (Current):   Vitals:    03/04/22 0645 03/04/22 0651   BP:  138/85   Pulse:  86   Resp:  16   Temp:  98.4 °F (36.9 °C)   TempSrc:  Infrared   SpO2:  96%   Weight: 252 lb (114.3 kg)    Height: 4' 9\" (1.448 m)                                               BP Readings from Last 3 Encounters:   03/04/22 138/85   02/18/22 119/82   01/18/22 (!) 160/109       NPO Status: Time of last liquid consumption: 2359                        Time of last solid consumption: 2359                        Date of last liquid consumption: 03/03/22                        Date of last solid food consumption: 03/03/22    BMI:   Wt Readings from Last 3 Encounters:   03/04/22 252 lb (114.3 kg)   02/18/22 252 lb (114.3 kg)   02/17/22 252 lb (114.3 kg)     Body mass index is 54.53 kg/m². CBC:   Lab Results   Component Value Date    WBC 8.3 02/22/2022    RBC 4.78 02/22/2022    HGB 13.0 02/22/2022    HCT 41.0 02/22/2022    MCV 85.8 02/22/2022    RDW 13.4 02/22/2022     02/22/2022       CMP:   Lab Results   Component Value Date     02/22/2022    K 4.5 02/22/2022     02/22/2022    CO2 19 02/22/2022    BUN 21 02/22/2022    CREATININE 0.47 02/22/2022    GFRAA >60 02/22/2022    LABGLOM >60 02/22/2022    GLUCOSE 84 02/22/2022    PROT 6.8 02/22/2022    CALCIUM 9.0 02/22/2022    BILITOT 0.54 02/22/2022    ALKPHOS 94 02/22/2022    AST 16 02/22/2022    ALT 18 02/22/2022       POC Tests: No results for input(s): POCGLU, POCNA, POCK, POCCL, POCBUN, POCHEMO, POCHCT in the last 72 hours. Coags: No results found for: PROTIME, INR, APTT    HCG (If Applicable):   Lab Results   Component Value Date    HCG NEGATIVE 03/04/2022    HCGQUANT <1 05/18/2021        ABGs: No results found for: PHART, PO2ART, NKO1TZJ, CMU0KVF, BEART, D9EPWCUG     Type & Screen (If Applicable):   No results found for: Edel Agosto    Drug/Infectious Status (If Applicable):  No results found for: HIV, HEPCAB    COVID-19 Screening (If Applicable): No results found for: COVID19        Anesthesia Evaluation  Patient summary reviewed and Nursing notes reviewed  Airway: Mallampati: III  TM distance: >3 FB   Neck ROM: full  Mouth opening: > = 3 FB Dental: normal exam         Pulmonary:Negative Pulmonary ROS and normal exam                               Cardiovascular:Negative CV ROS                      Neuro/Psych:   Negative Neuro/Psych ROS              GI/Hepatic/Renal:   (+) morbid obesity         ROS comment: -NAUSEA. Endo/Other:                      ROS comment: -NPO AFTER MIDNIGHT  -NKDA Abdominal:             Vascular: negative vascular ROS. Other Findings:             Anesthesia Plan      MAC     ASA 3       Induction: intravenous. MIPS: Postoperative opioids intended and Prophylactic antiemetics administered. Anesthetic plan and risks discussed with patient. Plan discussed with CRNA.     Attending anesthesiologist reviewed and agrees with Nanda Wakefield MD   3/4/2022

## 2022-03-04 NOTE — ANESTHESIA POSTPROCEDURE EVALUATION
POST- ANESTHESIA EVALUATION       Pt Name: Zina Llamas  MRN: [id number]  YOB: 1995  Date of evaluation: 3/4/2022  Time:  8:42 AM      BP (!) 135/92   Pulse 88   Temp 96.4 °F (35.8 °C) (Temporal)   Resp 26   Ht 4' 9\" (1.448 m)   Wt 252 lb (114.3 kg)   SpO2 99%   BMI 54.53 kg/m²      Consciousness Level  Awake  Cardiopulmonary Status  Stable  Pain Adequately Treated YES  Nausea / Vomiting  NO  Adequate Hydration  YES  Anesthesia Related Complications NONE      Electronically signed by Mauro Aguilar MD on 3/4/2022 at 8:42 AM       Department of Anesthesiology  Postprocedure Note    Patient: Zina Llamas  MRN: [id number]  YOB: 1995  Date of evaluation: 3/4/2022  Time:  8:42 AM     Procedure Summary     Date: 03/04/22 Room / Location: [address]    Anesthesia Start: 4917 Anesthesia Stop: 7457    Procedure: EGD BIOPSY (N/A ) Diagnosis:       (K21.9  GERD)      (E66.9  OBESITY)    Surgeons: Dong An DO Responsible Provider: Mauro Aguilar MD    Anesthesia Type: MAC ASA Status: 3          Anesthesia Type: MAC    Nagi Phase I: Nagi Score: 10    Nagi Phase II: Nagi Score: 9    Last vitals: Reviewed and per EMR flowsheets.        Anesthesia Post Evaluation

## 2022-03-07 LAB — SURGICAL PATHOLOGY REPORT: NORMAL

## 2022-03-17 ENCOUNTER — OFFICE VISIT (OUTPATIENT)
Dept: BARIATRICS/WEIGHT MGMT | Age: 27
End: 2022-03-17
Payer: COMMERCIAL

## 2022-03-17 VITALS
HEIGHT: 57 IN | WEIGHT: 246 LBS | BODY MASS INDEX: 53.07 KG/M2 | DIASTOLIC BLOOD PRESSURE: 70 MMHG | HEART RATE: 92 BPM | SYSTOLIC BLOOD PRESSURE: 120 MMHG

## 2022-03-17 DIAGNOSIS — E66.01 MORBID OBESITY WITH BMI OF 50.0-59.9, ADULT (HCC): ICD-10-CM

## 2022-03-17 DIAGNOSIS — K21.9 GASTROESOPHAGEAL REFLUX DISEASE WITHOUT ESOPHAGITIS: Primary | ICD-10-CM

## 2022-03-17 PROCEDURE — 99212 OFFICE O/P EST SF 10 MIN: CPT | Performed by: SURGERY

## 2022-03-17 NOTE — PROGRESS NOTES
Medical Nutrition Therapy   Metabolic and Bariatric Surgery         Supervised diet and exercise preparation  Visit 2 out of 3    Changes in eating patterns to promote health are noted below on the goals number 19-22    Vitals: Wt Readings from Last 3 Encounters:   03/17/22 246 lb (111.6 kg)   03/04/22 252 lb (114.3 kg)   02/18/22 252 lb (114.3 kg)         Nutrition Assessment:   PES: Knowledge deficit related to healthy behaviors that support weight management post weight loss surgery as evidenced by Body mass index is 53.23 kg/m². Nutrition Assessment of Goal Attainment:  TREATMENT GOALS:    1. Pt  Completed 3 out of 4 goals. 2.TREATMENT GOALS FOR UPCOMING WEEK: continue all previous goals and add: # 12    All goals were planned with and agreed on by the patient. I want to improve my health because I want to be healthier and get pregnant. appt # NA G What is your next step? C 1 2 3 4 5 6 7 8 9       1 I will read the education binder provided to me and the   x              0  2 I will make my pschological evaluation appoinment. x 100             2  3 I will bring this goal card to every appointment. 0 100             x 4 I will eliminate all tobacco/nicotine. x 5 I will limit alcoholic beverages to 4-4MQ per week. x 6 I will limit dining out to 3 times per week or less. x 7 I will eliminate sugary beverages. x 8 I will eliminate carbonated beverages. 2  9 I will eliminate drinking with a straw. x 10 I will limit caffeinated beverages to 16oz daily. 2  11 I will limit log my exercise daily. 100 100            2  12 I will determine my calcium and mvi plan. 13 I will have 1-2 servings of lean protein present at each meal and minimeal.               2  14 I will eat every 3-5 hours. 70 100            2  15 I will drink 64oz of fluid daily.   70 0              16 I will eat slowly during meals and snacks. 17 I will limit fluids 4oz before after and during meals. 18 I will eat protein first at all meals followed by vegetables,  Fruit and lastly whole grains. 23 My first weight neutral approach is:                 20 My second weight neutral approach is:                 21  My Thirds weight neutral approach is:                 22                  23                  24                  25                    Questions asked for goal understanding:                                                  Do you understand your goals? Do you have the information you need to achieve your goals? Do you have any questions  right now? [x]  Consistent goal achievement in the program thus far and further success with goals is expected. []  Unable to consistently make progress in goal achievement. At this time patient is not moving forward  in developing the skills needed for success after surgery. Plan:    Continue to follow monthly and review goals.          [x]  Nutrition visits complete    []

## 2022-03-23 NOTE — PROGRESS NOTES
[address] [facility] MIN INVASIVE BARIATRIC SURG  [address] [place] [address] [place]  [address]  [place] [id number]  Dept: [phone]    3/17/2022    CC: Weight Loss    History:  23year old female who presents for supervised diet and exercise. She is working on dietary goals and lost over 10 lbs. No new events. She completed EGD and labs. Scheduled her psychology visit. She has done well cutting carbohydrates and increasing protein. No new complaints. Ultrasound was negative.     Review of Systems:  General  Negative for: [] Weight Change   [x] Fatigue   [x] Fevers & Chills    [] Appetite change [] Other:    Positive for: [x] Weight Change   [] Fatigue   [] Fevers & Chills    [] Appetite change [] Other:   Cardiac  Negative for: [x] Chest Pain   [x] Difficulty Breathing   [] Leg Cramps [x] Edema of Lower Extremeties    [] Left   [] Right      Positive for: [] Chest Pain   [] Difficulty Breathing   [] Leg Cramps [] Edema of Lower Extremeties    [] Left   [] Right   Pulmonary  Negative for: [x] Shortness of Breath [] Wheeze [x] Cough  [] Calf Pain     Positive for: [] Shortness of Breath [] Wheeze [] Cough  [] Calf Pain   Gastro-Intestinal Negative for: [] Heartburn   [] Reflux   [] Dysphagia   [] Melena   [] BRBPR  [x] Vomiting   [x] Abdominal Pain   [] Diarrhea     [] Constipation  [] Other:     Positive for: [] Heartburn   [] Reflux   [] Dysphagia   [] Melena   [] BRBPR  [] Vomiting   [] Abdominal Pain   [] Diarrhea     [] Constipation  [] Other:    Muskuloskeletal Negative for: [] Joint pain   [] Back pain   [] Knee Pain   [x] Muscle weakness [x] Hernia   [] Edema [] Other:     Positive for: [] Joint pain   [] Back pain   [] Knee Pain   [] Muscle weakness [] Hernia   [] Edema [] Other:    Neurologic Negative for: [x] Syncope   [x] Insomnia   [] Being treated for depression  [] Other:     Positive for: [] Syncope   [] Insomnia   [] Being treated for depression  [] Other:    Skin Negative for: [x] Wound:   [] Open   [] Draining   [] Incisional     [x] Rash   [] Hair Loss  [] Other:     Positive for: [] Wound:   [] Open   [] Draining    [] Incisional  [] Rash   [] Hair Loss  [] Other:        Physical Exam:  /70 (Site: Right Upper Arm, Position: Sitting, Cuff Size: Large Adult)   Pulse 92   Ht 4' 9\" (1.448 m)   Wt 246 lb (111.6 kg)   BMI 53.23 kg/m²   Constitutional:  Vital signs are normal. The patient appears well-developed and well-nourished. HEENT:   Head: Normocephalic. Atraumatic  Eyes: pupils are equal and reactive. No scleral icterus is present. Neck: No mass and no thyromegaly present. Cardiovascular: Normal rate, regular rhythm, S1 normal and S2 normal.  Radial pulses present   Pulmonary/Chest: Effort normal and breath sounds normal. No retractions  Abdominal: Soft. Normal appearance. There is no organomegaly. No tenderness. There is no rigidity, no rebound, no guarding and no Hook's sign. Musculoskeletal:        Right lower leg: Normal. No tenderness and no edema. Left lower leg: Normal. No tenderness and no edema. Neurological: The patient is alert and oriented. Moving all 4 extremities, sensation grossly intact bilateral  Skin: Skin is warm, dry and intact. Psychiatric: The patient has a normal mood and affect.  Speech is normal and behavior is normal. Judgment and thought content normal. Cognition and memory are normal.     Assessment:  Morbid obesity, BMI 53  Ultrasound was negative, reviewed with patient  GERD, risks of sleeve increasing GERD discussed    Plan:  Dietician visit today, continue to work on goals  Ultrasound and labs reviewed with patient  Psychology visit pending  She is actively working on her goals  EGD reviewed with patient  Continue supervised diet and exercise, she is progressing well Information: Selecting Yes will display possible errors in your note based on the variables you have selected. This validation is only offered as a suggestion for you. PLEASE NOTE THAT THE VALIDATION TEXT WILL BE REMOVED WHEN YOU FINALIZE YOUR NOTE. IF YOU WANT TO FAX A PRELIMINARY NOTE YOU WILL NEED TO TOGGLE THIS TO 'NO' IF YOU DO NOT WANT IT IN YOUR FAXED NOTE.

## 2022-04-14 ENCOUNTER — OFFICE VISIT (OUTPATIENT)
Dept: BARIATRICS/WEIGHT MGMT | Age: 27
End: 2022-04-14
Payer: COMMERCIAL

## 2022-04-14 VITALS
SYSTOLIC BLOOD PRESSURE: 132 MMHG | HEART RATE: 103 BPM | BODY MASS INDEX: 52.58 KG/M2 | DIASTOLIC BLOOD PRESSURE: 82 MMHG | RESPIRATION RATE: 20 BRPM | WEIGHT: 243 LBS

## 2022-04-14 DIAGNOSIS — K21.9 GASTROESOPHAGEAL REFLUX DISEASE WITHOUT ESOPHAGITIS: Primary | ICD-10-CM

## 2022-04-14 DIAGNOSIS — E66.01 MORBID OBESITY WITH BMI OF 50.0-59.9, ADULT (HCC): ICD-10-CM

## 2022-04-14 PROCEDURE — 99213 OFFICE O/P EST LOW 20 MIN: CPT | Performed by: SURGERY

## 2022-04-14 NOTE — PROGRESS NOTES
Medical Nutrition Therapy   Metabolic and Bariatric Surgery         Supervised diet and exercise preparation  Visit 3 out of 3  Pt reports:      Changes in eating patterns to promote health are noted below on the goals number 19-22    Vitals: Wt Readings from Last 3 Encounters:   04/14/22 243 lb (110.2 kg)   03/17/22 246 lb (111.6 kg)   03/04/22 252 lb (114.3 kg)         Nutrition Assessment:   PES: Knowledge deficit related to healthy behaviors that support weight management post weight loss surgery as evidenced by Body mass index is 52.58 kg/m². Nutrition Assessment of Goal Attainment:  TREATMENT GOALS:    1. Pt  Completed 3 out of 3 goals. 2.TREATMENT GOALS FOR UPCOMING WEEK: continue all previous goals and add: # 0    All goals were planned with and agreed on by the patient. I want to improve my health because I want to be healthier and get pregnant. appt # NA G What is your next step? C 1 2 3 4 5 6 7 8 9       1 I will read the education binder provided to me and the   x              0  2 I will make my pschological evaluation appoinment. x 100             2  3 I will bring this goal card to every appointment. 0 100 100            x 4 I will eliminate all tobacco/nicotine. x 5 I will limit alcoholic beverages to 7-8CB per week. x 6 I will limit dining out to 3 times per week or less. x 7 I will eliminate sugary beverages. x 8 I will eliminate carbonated beverages. 2  9 I will eliminate drinking with a straw. x   100            x 10 I will limit caffeinated beverages to 16oz daily. 2  11 I will limit log my exercise daily. 100 100            2  12 I will determine my calcium and mvi plan. 100             13 I will have 1-2 servings of lean protein present at each meal and minimeal.               2  14 I will eat every 3-5 hours. 70 100            2  15 I will drink 64oz of fluid daily.   70 0 3  16 I will eat slowly during meals and snacks. 3  17 I will limit fluids 4oz before after and during meals. 3  18 I will eat protein first at all meals followed by vegetables,  Fruit and lastly whole grains. 23 My first weight neutral approach is:                 20 My second weight neutral approach is:                 21  My Thirds weight neutral approach is:                 22                  23                  24                  25                    Questions asked for goal understanding:                                                  Do you understand your goals? Do you have the information you need to achieve your goals? Do you have any questions  right now? [x]  Consistent goal achievement in the program thus far and further success with goals is expected. []  Unable to consistently make progress in goal achievement. At this time patient is not moving forward  in developing the skills needed for success after surgery. Plan:    Continue to follow monthly and review goals.          []  Nutrition visits complete    [x]

## 2022-04-18 RX ORDER — THIAMINE MONONITRATE (VIT B1) 100 MG
100 TABLET ORAL DAILY
Qty: 30 TABLET | Refills: 0 | Status: SHIPPED | OUTPATIENT
Start: 2022-04-18 | End: 2022-11-03 | Stop reason: ALTCHOICE

## 2022-04-20 NOTE — PROGRESS NOTES
[address] [facility] MIN INVASIVE BARIATRIC SURG  [address] [place] [address] [place]  [address]  [place] [phone]  Dept: [phone]    4/14/2022    CC: Weight Loss    History:  23year old female who presents for supervised diet and exercise. She has doing really well with diet and exercise and lost weight each visit. She denies new complaints. All testing is now completed. Dietician goals are complete. No new events.   She has maintained a low carbohydrate diet    Review of Systems:  General  Negative for: [] Weight Change   [x] Fatigue   [x] Fevers & Chills    [] Appetite change [] Other:    Positive for: [x] Weight Change   [] Fatigue   [] Fevers & Chills    [] Appetite change [] Other:   Cardiac  Negative for: [x] Chest Pain   [x] Difficulty Breathing   [] Leg Cramps [x] Edema of Lower Extremeties    [] Left   [] Right      Positive for: [] Chest Pain   [] Difficulty Breathing   [] Leg Cramps [] Edema of Lower Extremeties    [] Left   [] Right   Pulmonary  Negative for: [x] Shortness of Breath [] Wheeze [x] Cough  [] Calf Pain     Positive for: [] Shortness of Breath [] Wheeze [] Cough  [] Calf Pain   Gastro-Intestinal Negative for: [] Heartburn   [] Reflux   [] Dysphagia   [] Melena   [] BRBPR  [x] Vomiting   [x] Abdominal Pain   [] Diarrhea     [] Constipation  [] Other:     Positive for: [] Heartburn   [] Reflux   [] Dysphagia   [] Melena   [] BRBPR  [] Vomiting   [] Abdominal Pain   [] Diarrhea     [] Constipation  [] Other:    Muskuloskeletal Negative for: [] Joint pain   [] Back pain   [] Knee Pain   [x] Muscle weakness [x] Hernia   [] Edema [] Other:     Positive for: [] Joint pain   [] Back pain   [] Knee Pain   [] Muscle weakness [] Hernia   [] Edema [] Other:    Neurologic Negative for: [x] Syncope   [x] Insomnia   [] Being treated for depression  [] Other:     Positive for: [] Syncope   [] Insomnia   [] Being treated for depression  [] Other:    Skin Negative for: [x] Wound:   [] Open   [] Draining   [] Incisional     [x] Rash   [] Hair Loss  [] Other:     Positive for: [] Wound:   [] Open   [] Draining    [] Incisional  [] Rash   [] Hair Loss  [] Other:        Physical Exam:  /82 (Site: Left Upper Arm, Position: Sitting, Cuff Size: Large Adult)   Pulse 103   Resp 20   Wt 243 lb (110.2 kg)   LMP 03/30/2022   BMI 52.58 kg/m²   Constitutional:  Vital signs are normal. The patient appears well-developed and well-nourished. HEENT:   Head: Normocephalic. Atraumatic  Eyes: pupils are equal and reactive. No scleral icterus is present. Neck: No mass and no thyromegaly present. Cardiovascular: Normal rate, regular rhythm, S1 normal and S2 normal.  Radial pulses present   Pulmonary/Chest: Effort normal and breath sounds normal. No retractions  Abdominal: Soft. Normal appearance. There is no organomegaly. No tenderness. There is no rigidity, no rebound, no guarding and no Hook's sign. Musculoskeletal:        Right lower leg: Normal. No tenderness and no edema. Left lower leg: Normal. No tenderness and no edema. Neurological: The patient is alert and oriented. Moving all 4 extremities, sensation grossly intact bilateral  Skin: Skin is warm, dry and intact. Psychiatric: The patient has a normal mood and affect.  Speech is normal and behavior is normal. Judgment and thought content normal. Cognition and memory are normal.       Assessment:  Morbid obesity, BMI 52  GERD, risks of sleeve increasing GERD discussed    Plan:  Dietician visit today, she has completed goals  She has lost weight and is a candidate for surgery  Psychology visit completed  EGD reviewed with patient  Continue supervised diet and exercise, she is progressing well  All questions answered

## 2022-04-27 ENCOUNTER — OFFICE VISIT (OUTPATIENT)
Dept: BEHAVIORAL/MENTAL HEALTH CLINIC | Age: 27
End: 2022-04-27
Payer: COMMERCIAL

## 2022-04-27 DIAGNOSIS — E66.01 MORBID OBESITY WITH BMI OF 50.0-59.9, ADULT (HCC): Primary | ICD-10-CM

## 2022-04-27 PROCEDURE — 96130 PSYCL TST EVAL PHYS/QHP 1ST: CPT | Performed by: PSYCHOLOGIST

## 2022-04-27 PROCEDURE — 90791 PSYCH DIAGNOSTIC EVALUATION: CPT | Performed by: PSYCHOLOGIST

## 2022-04-27 PROCEDURE — 96136 PSYCL/NRPSYC TST PHY/QHP 1ST: CPT | Performed by: PSYCHOLOGIST

## 2022-04-27 PROCEDURE — 96131 PSYCL TST EVAL PHYS/QHP EA: CPT | Performed by: PSYCHOLOGIST

## 2022-04-27 PROCEDURE — 96137 PSYCL/NRPSYC TST PHY/QHP EA: CPT | Performed by: PSYCHOLOGIST

## 2022-04-27 NOTE — PROGRESS NOTES
Bariatric Pre-Surgical Psychology Initial Evaluation  Kipp Opitz, M. A.     Psychology Doctoral Trainee     Supervising Clinical Psychologists:  Kay Diaz, Ph.D. [place] License [facility]. [address] [place] [address]     Visit Date: 4/27/2022   Time of appointment:  2:30 pm   Time spent with patient: 2 hours  Time spent writing report: 2  Interactive feedback provided on: 4/28/22     Breakdown of charges:   57341- first hour assessment   83468 - half hour assessment (1 unit)  17242 - half hour assessment (3 units)   - first hour report writing (1 unit)  32720 - 1 hour report writing and interpretive feedback (1 unit)    Pt provided informed consent for the behavioral health evaluation.  Discussed with patient the limits of confidentiality (i.e. abuse reporting, suicide intervention, etc.) and purpose of evaluation. Also discussed with patient that the service provider is a supervised clinician and is being supervised by Dr. Demetria Aguilar and/or Dr. Smiley Knows indicated understanding. Identifying Data and Reason for Referral:  Panchito Callaway is a 23 y.o. female, who was seen for a pre-surgical psychological evaluation. As part of this evaluation, a clinical interview was conducted. Additionally, the patient was administered the [place] 4 (MMPI-3), Patient Health Questionnaire -9 (PHQ-9), Generalized Anxiety Disorder - 7 (ARPITA-7), Alcohol Use Disorders Identification Test (AUDIT), and Eating Disorders Examination Questionnaire (NAYA-Q)    Weight History and Weight Loss Efforts:   Panchito Callaway reported that she was first overweight at age 16. Her highest reported weight has been 260 lbs. , which was her weight at age 23 (4 months ago). Her lowest reported weight has been 243 lbs. , which is her weight currently. Panchito Callaway stated she was [employer]" growing up but was always a \"healthy weight. \" She stated she always had 3 meals a day with snacks in between.  Meals were either home the appropriate portions. A typical lunch might be leftovers or a salad (spinach, one hard boiled egg, cucumbers, peppers, chicken, and 1/2 tablespoon of dressing). For dinner, TELLY Morrison or her  will usually cook at home. Once again, she tries to eat a balance meal. A typical dinner might look like barbeque chicken breasts, broccoli, and roasted potatoes. TELLY Morrison has about 1 snack a day. She stated she will often have a snack after work or after she works out. A typical snack looks like an apple with peanut butter, cucumbers, or a fruit bar. She drinks water and coffee throughout the day. She drinks her coffee black with 3 tablespoons of sugar. She stated she often east lunch at her desk or in her car and usually has dinner in front of the TV. She reported her and her  go out to eat every other week on Fridays or Saturdays. She stated they try not to eat out but when they do they often try out new places. TELLY Morrison has made several modifications in the last 4 months to prepare for surgery including counting calories, eliminating gluten and dairy, and working out regularly. She stated she tries to be mindfully aware about foods she is eating and how they make her feel (e.g., if a particular food gives her energy or makes her feel \"bad\"). TELLY Morrison reported that she engages in a moderate amount of exercise, which includes working out at least 5 days a week doing high-intensity interval training for 30 minutes at [employer]. She stated she also gets in walking/steps at work. Knowledge of Bariatric Surgery  TELLY Morrison was able to adequately describe the Sleeve Gastrectomy procedure as well as the modifications She will need to make after the surgery with respect to the amount, frequency and types of food. She was able to evidence realistic goals and also able to recognize that Her efforts will help create a good outcome.   She was able to identify that there are individual differences in the response and refeeding process. She appeared to have Adequate knowledge of the risks and benefits of bariatric surgery and was able to identify risks of surgery which include infection, poor outcomes (complications, malnutrition, hair loss), and relapse. She understands the recovery process after surgery varies and can take around 4-6 weeks. She currently works as an [place] with the [employer] and has seen several patients go through the [employer] program. She reported seeing the process has been helpful in allowing her to understand what habits/behaviors can create better outcomes after the surgery. Motivation for Weight Loss  Beni Allison reported that her motivation for Sleeve Gastrectomy bariatric surgery is due to pregnancy and \"finding a healthier version of [herself]. \" She stated her Ob/Gyn recommended she lose some weight before conceiving to ease the burden of weight gain that comes with pregnancy. She stated she feels better since she has started to work out regularly and wants to feel healthy. She has a goal weight of 170-180 lbs but stated her primary goal is to be in better health. Social History:         Living Arrangement: Beni Allison currently lives in a townhouse with her . They were  in September 2020. They do not have any children. 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 She currently works as an [place] for [employer] and works at the [employer] clinic. She works full-time from 8:30 am -5 pm on Mondays/Wednesdays/Fridays and from 10 am-6 pm on Tuesdays and Thursday. She has worked at [employer] for one year. In June she will start school at [employer] for her degree in social work. Social Support: She reported that She has adequate social support on which to rely during recovery from surgery.  Her mother works from home during the day and her  will be taking time off work to help during recovery. Childhood/Family: Woodrow Patel was born and raised in [place], [place] by both her mother and father. She has an older brother (10years older). She reported her father got sick (histoplasmosis) when she was around 9years old and her mother was the primary caregiver and provider. She stated her mother worked 3 jobs to provide for the household. She stated she is close with her mother. Relevant Psychiatric History:        Woodrow Patel reported a history of work-related anxiety. 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 She stated she took escitalopram (Lexapro) for approximately one month but \"did not feel like\" herself and was having stomach issues so she stopped taking it. She denied a history of psychiatric hospitalizations or suicide attempts. Current Psychological Symptoms:  Woodrow Patel reports current stressors include marriage and fertility issues. She reported she sees a therapist for marriage counseling and infertility. She has been seeing her therapist since December 2021. She reports getting an average of 7-8 hours of sleep a night. She stated she is usually in bed by 10-11 pm and wakes up around 6-7 am. She stated she feels rested when she wakes up. Relevant Medical History:  Woodrow Patel reported a medical history significant for polycystic ovary syndrome (PCOS) and hypertension. She reported she was on medication for high blood pressure but since she has been working out regularly her blood pressure has been at an appropriate level and she is no longer on medication. She denied a surgical history. Her current medications include a daily multivitamin. Substance Use:  TOBACCO:  She reports that she has never smoked. She has never used smokeless tobacco.  ALCOHOL:  She reports current alcohol use. She reports having 1 alcoholic drink/month on average. OTHER SUBSTANCES: She reports no history of drug use.      Family History:  She reported a family history of weight issues in her mother and father. She also reported her paternal grandmother had gastric bypass surgery. Mental Status:  Mood was within normal limits with congruent affect. Suicidal ideation was denied. Homicidal ideation was denied. Hygiene was good . Dress was appropriate. Behavior was Within Normal Limits with No observation or self-report of difficulties ambulating. Attitude was Cooperative and Friendly. Eye-contact was good. Speech: rate - WNL, rhythm -  WNL, volume - WNL  Verbalizations were goal directed and coherent. Thought processes were intact and goal-oriented without evidence of delusions, hallucinations, obsessions, or eric. Associations were characterized by intact cognitive processes. Pt was oriented to person, place, time, and general circumstances;  recent:  good and remote:  good. Insight and judgment were estimated to be good, AEB, a good  understanding of cyclical maladaptive patterns, and the ability to use insight to inform behavior change. Psychological Testing Results: The MMPI-3 is a measure of personality and psychiatric symptoms. It was examined for validity, and the profile indicates Henrique Trujillo reported consistent patterns of responding and there were no indications of over-reporting. Her responses indicate under-reporting which suggest that she may be presenting herself in a favorable light or that she is well-adjusted. Given concerns of under-reporting, her responses on this measure should be interpreted with caution but are considered valid. She reported somatic complaints which may reflect a preoccupation with physical health concerns. There are no indications of cognitive complaints, or of emotional, thought, behavioral, or interpersonal dysfunction. Her responses on the MMPI-3 were consistent with her self-report during the evaluation. Henrique Trujillo was also administered the PHQ-9 and ARPITA-7 to measure depression and anxiety.   She obtained a score of 0 on the PHQ-9,

## 2022-04-28 ENCOUNTER — TELEPHONE (OUTPATIENT)
Age: 27
End: 2022-04-28

## 2022-05-12 ENCOUNTER — TELEPHONE (OUTPATIENT)
Dept: BARIATRICS/WEIGHT MGMT | Age: 27
End: 2022-05-12

## 2022-05-13 NOTE — TELEPHONE ENCOUNTER
Patient's record has been submitted to the insurance company on 5/13/22 for authorization to schedule surgery. Instructions to patient: if patient calls for status on authorization to schedule surgery please instruct patient that it could take up to 30 days for an authorization. Once authorization is received the insurance specialists will contact the patient to schedule their procedure.       Program fee paid in full yes

## 2022-06-07 ENCOUNTER — HOSPITAL ENCOUNTER (OUTPATIENT)
Age: 27
Setting detail: SPECIMEN
Discharge: HOME | End: 2022-06-07

## 2022-06-07 ENCOUNTER — OFFICE VISIT (OUTPATIENT)
Dept: OBGYN CLINIC | Age: 27
End: 2022-06-07
Payer: COMMERCIAL

## 2022-06-07 VITALS
WEIGHT: 249 LBS | SYSTOLIC BLOOD PRESSURE: 138 MMHG | HEIGHT: 57 IN | DIASTOLIC BLOOD PRESSURE: 82 MMHG | HEART RATE: 80 BPM | BODY MASS INDEX: 53.72 KG/M2

## 2022-06-07 DIAGNOSIS — N89.8 VAGINAL DISCHARGE: ICD-10-CM

## 2022-06-07 DIAGNOSIS — E28.2 PCO (POLYCYSTIC OVARIES): ICD-10-CM

## 2022-06-07 DIAGNOSIS — Z01.419 WELL WOMAN EXAM WITH ROUTINE GYNECOLOGICAL EXAM: Primary | ICD-10-CM

## 2022-06-07 LAB
CANDIDA SPECIES, DNA PROBE: NEGATIVE
GARDNERELLA VAGINALIS, DNA PROBE: NEGATIVE
SOURCE: NORMAL
TRICHOMONAS VAGINALIS DNA: NEGATIVE

## 2022-06-07 PROCEDURE — 99395 PREV VISIT EST AGE 18-39: CPT | Performed by: OBSTETRICS & GYNECOLOGY

## 2022-06-07 RX ORDER — ETONOGESTREL AND ETHINYL ESTRADIOL 11.7; 2.7 MG/1; MG/1
1 INSERT, EXTENDED RELEASE VAGINAL
Qty: 3 EACH | Refills: 3 | Status: SHIPPED | OUTPATIENT
Start: 2022-06-07 | End: 2022-11-03 | Stop reason: ALTCHOICE

## 2022-06-07 ASSESSMENT — ENCOUNTER SYMPTOMS
BACK PAIN: 0
SHORTNESS OF BREATH: 0
ABDOMINAL PAIN: 0
COUGH: 0

## 2022-06-07 NOTE — PROGRESS NOTES
[facility] - [address] [address] [facility] [address]  Dept: [phone]    Chief complaint:   Chief Complaint   Patient presents with    Gynecologic Exam     Last pap 5/7/19 WNL        History Present Illness: Jeff Rosario is a 24 yo female who presents for her annual exam.  She is planning on bariatric surgery and is on the pathway for it. She is thinking end of summer for surgery. She is having some irregularity with her periods and they are starting later. She has BV symptoms when she starts her period. She says that she gets a discomfort in the vagina. She is sexually active with her  and does have a little discomfort with intercourse. She denies any changes in positions or times of her cycle. She denies any vaginal discharge or pelvic pain otherwise. She denies any bowel or bladder issues. Current Medications (OTC/Herbal):   Current Outpatient Medications   Medication Sig Dispense Refill    etonogestrel-ethinyl estradiol (NUVARING) 0.12-0.015 MG/24HR vaginal ring Place 1 each vaginally every 21 days Insert one (1) ring vaginally and leave in place for three (3) weeks, then remove for one (1) week. 3 each 3    Multiple Vitamin (MULTIVITAMIN ADULT PO) Take by mouth      vitamin B-1 (THIAMINE) 100 MG tablet Take 1 tablet by mouth daily (Patient not taking: Reported on 6/7/2022) 30 tablet 0    vitamin D (ERGOCALCIFEROL) 1.25 MG (86765 UT) CAPS capsule Take 1 capsule by mouth once a week for 8 doses 8 capsule 0    pantoprazole (PROTONIX) 40 MG tablet Take 1 tablet by mouth every morning (before breakfast) (Patient not taking: Reported on 6/7/2022) 30 tablet 5     No current facility-administered medications for this visit.      Allergies: No Known Allergies  Past Medical History:   Past Medical History:   Diagnosis Date    HTN (hypertension)     does not take meds    Obesity     PCOS (polycystic ovarian syndrome)      Past Surgical History:   Past Surgical History:   Procedure Laterality Date    UPPER GASTROINTESTINAL ENDOSCOPY  2022    UPPER GASTROINTESTINAL ENDOSCOPY N/A 3/4/2022    EGD BIOPSY performed by Alec Penn DO at [address] [place] [address].      Obstetric History:   0  Para 0  Gynecologic History: LMP 23   Menarche 10  Duration 6 d    Interval q  5 wks  Tampons/Pads in a day: 4-5  Last Pap:        Any history of abnormal paps no    PriorColpo/Biopsy n/a     Last Mammogram n/a  Contraception: none  Complications: none  STDs: none  Psychosocial History: Occupation:   Works in [employer], school for [employer] work   Caffeine Yes    At risk for depression No    Abuse:   No  Seatbelt:   Yes  Exercise:  yes    Social History     Socioeconomic History    Marital status:      Spouse name: Not on file    Number of children: Not on file    Years of education: Not on file    Highest education level: Not on file   Occupational History    Not on file   Tobacco Use    Smoking status: Never Smoker    Smokeless tobacco: Never Used   Substance and Sexual Activity    Alcohol use: Yes     Comment: rare    Drug use: Never    Sexual activity: Yes     Partners: Male     Birth control/protection: I.U.D. Other Topics Concern    Not on file   Social History Narrative    Not on file     Social Determinants of Health     Financial Resource Strain:     Difficulty of Paying Living Expenses: Not on file   Food Insecurity:     Worried About Running Out of Food in the Last Year: Not on file    Jose of Food in the Last Year: Not on file   Transportation Needs:     Lack of Transportation (Medical): Not on file    Lack of Transportation (Non-Medical):  Not on file   Physical Activity:     Days of Exercise per Week: Not on file    Minutes of Exercise per Session: Not on file   Stress:     Feeling of Stress : Not on file   Social Connections:     Frequency of Communication with Friends and Family: Not on file    Frequency of Social Gatherings with Friends and Family: Not on file    Attends Orthodoxy Services: Not on file    Active Member of Clubs or Organizations: Not on file    Attends Club or Organization Meetings: Not on file    Marital Status: Not on file   Intimate Partner Violence:     Fear of Current or Ex-Partner: Not on file    Emotionally Abused: Not on file    Physically Abused: Not on file    Sexually Abused: Not on file   Housing Stability:     Unable to Pay for Housing in the Last Year: Not on file    Number of [place] in the Last Year: Not on file    Unstable Housing in the Last Year: Not on file       History reviewed. No pertinent family history. Review of Systems:   Review of Systems   Constitutional: Negative for chills and fever. HENT: Negative for congestion. Respiratory: Negative for cough and shortness of breath. Cardiovascular: Negative for chest pain and palpitations. Gastrointestinal: Negative for abdominal pain. Genitourinary: Positive for menstrual problem. Negative for dyspareunia, pelvic pain and vaginal discharge. Musculoskeletal: Negative for back pain. Neurological: Negative for dizziness and light-headedness. Psychiatric/Behavioral: The patient is not nervous/anxious. Physical exam:  vitals:  Height   4  ft    9 in,  Weight    249 lbs,   143/96 BP  Gen: alert, no apparent distress  HEENT:No pathologic skin lesions noted,NC/AT,PERRL, normal midline nontender thyroid   Lung Exam: Clear to auscultation in all fields bilaterally, without wheezes,rales or rhonchi. Cardiac Exam: Normal sinus rhythm andrate, without murmurs, rubs or gallops appreciated. Breast Exam: Symmetric without pathological skin changes, nontender without discrete suspicious masses palpated, supraclavicular or axillary adenopathy or nipple discharge noted.   Abdominal Exam: Nontender to deep palpation without organomegaly, masses or CVAT appreciated, BS positive. No spinal deformation or tenderness. External Genitalia: Normal development without vulvar,vaginal or cervical lesions noted. Normal vaginal discharge, uterus anterior, 4-6 weeks without CMT. Adnexa nontender without abnormal masses bilaterally. Rectal Exam: Omitted. Extremities: Nontender without clubbing, cyanosis or edema. F.R.O.M. Neurologic Exam: Grossly intact without noted sensorimotor deficits and oriented x 3. Assessment/Plan:   Unremarkable annual Gyn exam.    Cervical Cytology Evaluation begins at 24years old. If Negative Cytology, Follow-up screening per current guidelines. Mammograms every 1year. If 37 yo and last mammogram was negative. Hereditary Breast, Ovarian, Colon and Uterine Cancer screening done. Birth control and barrier recommendations discussed. Discussed diagnosis of PCOS based on oligo/amenorrhea, ultrasound findings and hyperandrogenism. Reviewed implications of the disease including increased risk for insulin resistance/DM, lipid abnormalities and cardiovascular disease, as well as GYN specific effects (possible infertility, oligomenorrhea, increased risk of endometrial cancer). Encouraged weight loss with diet and exercise. Reviewed medical management options for endometrial protection, including side effect profiles and dosing regimens. All questions answered. Patient desires to proceed with nuvaring at this time. STD counseling and prevention reviewed. Gardisil counseling completed for all patients 7-35 yo. Routine health maintenance per patients PCP.   Pt to follow up for annual exam in 1 year    Yennifer Faye MD  [address]

## 2022-06-13 LAB — CYTOLOGY REPORT: NORMAL

## 2022-07-26 ENCOUNTER — OFFICE VISIT (OUTPATIENT)
Dept: NEUROLOGY | Age: 27
End: 2022-07-26
Payer: COMMERCIAL

## 2022-07-26 VITALS
BODY MASS INDEX: 55.23 KG/M2 | HEART RATE: 81 BPM | WEIGHT: 256 LBS | SYSTOLIC BLOOD PRESSURE: 138 MMHG | DIASTOLIC BLOOD PRESSURE: 97 MMHG | HEIGHT: 57 IN

## 2022-07-26 DIAGNOSIS — R51.9 HEADACHE DISORDER: Primary | ICD-10-CM

## 2022-07-26 PROCEDURE — 99204 OFFICE O/P NEW MOD 45 MIN: CPT | Performed by: PSYCHIATRY & NEUROLOGY

## 2022-07-26 RX ORDER — ACETAMINOPHEN 500 MG
500 TABLET ORAL EVERY 6 HOURS PRN
COMMUNITY
End: 2022-11-03 | Stop reason: ALTCHOICE

## 2022-07-26 RX ORDER — RIZATRIPTAN BENZOATE 10 MG/1
10 TABLET ORAL
Qty: 30 TABLET | Refills: 3 | Status: SHIPPED | OUTPATIENT
Start: 2022-07-26 | End: 2022-08-05 | Stop reason: ALTCHOICE

## 2022-07-26 NOTE — PROGRESS NOTES
[facility], [address], [address] [place] [address]  Ph: [phone] or [phone]  FAX: [phone]    Chief Complaint: Headaches     Dear Toni Koch MD     I had the pleasure of seeing your patient today in neurology consultation for her symptoms. As you would recall Ravi Montes De Oca is a 33 y.o. female. The patient is presenting today complaining about headaches. She states that they have been going on for about 3 months. The headaches are bitemporal and radiating to the occipital. She describes the pain as \"sharp. \" The headaches have been occurring everyday. She takes Tylenol for headache prophylaxis. She becomes sensitive to light and sound. Denies diplopia or loss of vision. She does not have an aura for her headaches. The patient also complained of a tightness in her shoulders after the headaches started. Past Medical History:   Diagnosis Date    HTN (hypertension)     does not take meds    Obesity     PCOS (polycystic ovarian syndrome)      Past Surgical History:   Procedure Laterality Date    UPPER GASTROINTESTINAL ENDOSCOPY  03/04/2022    UPPER GASTROINTESTINAL ENDOSCOPY N/A 3/4/2022    EGD BIOPSY performed by Mae Blake DO at [address] [facility].     No Known Allergies  No family history on file. Social History     Socioeconomic History    Marital status:      Spouse name: Not on file    Number of children: Not on file    Years of education: Not on file    Highest education level: Not on file   Occupational History    Not on file   Tobacco Use    Smoking status: Never    Smokeless tobacco: Never   Substance and Sexual Activity    Alcohol use: Yes     Comment: rare    Drug use: Never    Sexual activity: Yes     Partners: Male     Birth control/protection: I.U.D.    Other Topics Concern    Not on file   Social History Narrative    Not on file     Social Determinants of Health     Financial Resource Strain: Not on file   Food Insecurity: Not on file   Transportation Needs: Not on file   Physical Activity: Not on file   Stress: Not on file   Social Connections: Not on file   Intimate Partner Violence: Not on file   Housing Stability: Not on file      There were no vitals taken for this visit. HEENT [] Hearing Loss  [] Visual Disturbance  [] Tinnitus  [] Eye pain   Respiratory [] Shortness of Breath  [] Cough  [] Snoring   Cardiovascular [] Chest Pain  [] Palpitations  [] Lightheaded   GI [] Constipation  [] Diarrhea  [] Swallowing change  [] Nausea/vomiting    [] Urinary Frequency  [] Urinary Urgency   Musculoskeletal [] Neck pain  [] Back pain  [] Muscle pain  [] Restless legs   Dermatologic [] Skin changes   Neurologic [] Memory loss/confusion  [] Seizures  [] Trouble walking or imbalance  [] Dizziness  [] Sleep disturbance  [] Weakness  [] Numbness  [] Tremors  [] Speech Difficulty  [x] Headaches  [] Light Sensitivity  [] Sound Sensitivity   Endocrinology []Excessive thirst  []Excessive hunger   Psychiatric [] Anxiety/Depression  [] Hallucination   Allergy/immunology []Hives/environmental allergies   Hematologic/lymph [] Abnormal bleeding  [] Abnormal bruising         General appearence: Normal. Well groomed.  In no acute distress    Head: Normocephalic, atraumatic  Eyes: Extraocular movements intact, eye lids normal  Lungs: Respirations unlabored, chest wall no deformity  ENT: Normal external ear canals, no sinus tenderness  Heart: Regular rate rhythm  Abdomen: No masses, tenderness  Extremities: No cyanosis or edema, 2+ pulses  Musculoskeletal: Normal range of motion in all joints  Skin: Intact, normal skin color    Neurological examination:    Mental status   Alert and oriented; intact memory with no confusion, speech or language problems; no hallucinations or delusions     Cranial nerves   II - visual fields intact to confrontation                                                III, IV, VI - extra-ocular muscles full: no pupillary defect; no BRENNAN, no nystagmus, no ptosis   V - normal facial sensation                                                               VII - normal facial symmetry                                                             VIII - intact hearing                                                                             IX, X - symmetrical palate                                                                  XI - symmetrical shoulder shrug                                                       XII - midline tongue without atrophy or fasciculation     Motor function  Normal muscle bulk and tone; normal power 5/5, including fine motor movements     Sensory function Intact to touch, pin, vibration, proprioception     Cerebellar Intact fine motor movement. No involuntary movements or tremors     Reflex function Intact 2+ DTR and symmetric. Negative Babinski     Gait                  Normal station and gait       Lab Results   Component Value Date    LDLCHOLESTEROL 87 02/22/2022     No components found for: CHLPL  Lab Results   Component Value Date    TRIG 53 02/22/2022     Lab Results   Component Value Date    HDL 44 02/22/2022     No results found for: LDLCALC  No results found for: LABVLDL  Lab Results   Component Value Date    LABA1C 5.0 02/22/2022     Lab Results   Component Value Date    EAG 97 02/22/2022     Lab Results   Component Value Date    MTDJFDHE22 933 02/22/2022      Neurological work up:  CT head  CTA head and neck  MRI brain   2 D echo     All of patient's labs were personally reviewed. All the imaging studies were personally reviewed and discussed with the patient. Assessment Recommendations:    Migraine headaches without aura with status migrainosus     I recommend the patient start Topamax 25 mg BID for headache prophylaxis.     I also recommend the patient start Maxalt 10 mg PRN for abortive treatment     MRI Brain with and without contrast to rule out structural brain lesion    All medication side effects were discussed and questions answered. Patient to follow up in 3-4 weeks or sooner if symptoms worsen. Mary Dawson MD I would like to thank you for the consult. Please do not hesitate if you have any questions about the patient care. Scribe Attestation:   By signing my name below, I, Sarah Bartolo, attest that this documentation has been prepared under the direction and in the presence of Mikey Ricks MD.    Electronically Signed: Sarah Mcfarland. 07/26/22. 8:00 AM        This note is created with the assistance of a speech-recognition program. While intending to generate a document that actually reflects the content of the visit, the document can still have some errors including those of syntax and sound a- like substitutions which may escape proofreading. In such instances, actual meaning can be extrapolated by contextual derivation.

## 2022-07-26 NOTE — PATIENT INSTRUCTIONS
Herbal Treatment of Headaches   Magnesium: Magnesium oxide (400-1000mg a day) has a relaxant effect on smooth muscles such as blood vessels. We often give intravenous magnesium to patients who come into the emergency department for migraine because it helps to break the migraine. Three trials found 40-90% average headache reduction when used as a preventative. Magnesium also demonstrated the benefit in menstrually related migraine. Magnesium is part of the messenger system in the serotonin cascade and it is a good muscle relaxant. It is also useful for constipation which can be a side effect of other medications used to treat migraine. Good sources include nuts, whole grains, and tomatoes. Coenzyme Q10: This is present in almost all cells in the body and is critical component for the conversion of energy. Recent studies have shown that a nutritional supplement of CoQ10 can reduce the frequency of migraine attacks by improving the energy production of cells as with riboflavin. Doses of 200 mg (or 150 mg) twice a day have been shown to be effective. Riboflavin (Vitamin B2): 200 mg twice a day (or 400 mg daily). This vitamin assists nerve cells in the production of ATP, a principal energy storing molecule. It is necessary for many chemical reactions in the body. There have been at least 3 clinical trials of riboflavin using 400 mg per day all of which suggested that migraine frequency can be decreased. All 3 trials showed significant improvement in over half of migraine sufferers. The supplement is found in bread, cereal, milk, meat, and poultry. Most Americans get more riboflavin than the recommended daily allowance, however riboflavin deficiency is not necessary for the supplements to help prevent headache. Butterbur: This is an extract derived from the petisides hybridus root, which has been used for medicinal purposes since ancient times.  A recent study found that 75 mg daily given over 4 months reduced headache frequency by 50% or more in over two thirds of the 245 patient studied. The 50 mg dose showed no significant effect. Side effects were infrequent, and the most common and unusual includes burping/belching. Raw ronit root contains toxic chemicals that must be filtered out during the manufacturing process. To be sure you are choosing a safe product. Look for a formulation that does not contain pyrrolizidine alkaloids which are toxic to the liver. Brand: Petodolex by Nilton Montague   Melatonin: Increasing evidence shows correlation between melatonin secretion and headache conditions. Melatonin supplementation has shown decreased headache intensity and duration. It is widely used as a sleep aid. Sleep is nature's way of dealing with migraine. A dose of 3 mg is recommended to start for headaches including cluster headache. Higher doses up to 15 mg has been reviewed for use in Cluster headache and have been used. The rationale behind using melatonin for cluster is that many theories regarding the cause of Cluster headache center around the disruption of the normal circadian rhythm in the brain. This helps restore the normal circadian rhythm. It should be taken at least 2 hours before bedtime. Hedy: Hedy has a small amount of anti-histamine and anti-inflammatory action which may help headache. It is primarily used for nausea and may aid in the absorption of other medications. *Please keep in mind that it takes 4-6 weeks for the medication to start working well and 2-3 months at the appropriate dose before deciding if it will be useful or not. If it is not helping at all by this time, then we will discuss other medications to try. Supplements may take 3-6 months until you see full effect. You can sometimes buy supplements cheaper (especially Coenzyme Q10) at www. [employer] or at [employer].

## 2022-07-27 RX ORDER — TOPIRAMATE 25 MG/1
TABLET ORAL
Qty: 60 TABLET | Refills: 3 | Status: SHIPPED | OUTPATIENT
Start: 2022-07-27 | End: 2022-11-03 | Stop reason: ALTCHOICE

## 2022-08-04 DIAGNOSIS — R51.9 HEADACHE DISORDER: Primary | ICD-10-CM

## 2022-08-04 NOTE — TELEPHONE ENCOUNTER
Patient seen last week, was prescribed Maxalt. She is still having extreme headaches, with no relief from Maxalt. She has burning in her finger tips, tightness in her neck and shoulders, burning in her face when she took the medicine. She is going on day 3 of her headache with no relief on the Topamax. Please advise if we can switch her Maxalt to a different medication.

## 2022-08-04 NOTE — TELEPHONE ENCOUNTER
We can give prescription for nurtec 75 mg to be taken as needed.    Additionally if headaches are bad, we can give prednisone taper 20 mg Take 3 tab po qd x 3 days, 2 tab po qd x 3 days, 1 tab po qd x 3 days

## 2022-08-08 RX ORDER — PREDNISONE 20 MG/1
TABLET ORAL
Qty: 18 TABLET | Refills: 0 | Status: SHIPPED | OUTPATIENT
Start: 2022-08-08 | End: 2022-11-03 | Stop reason: ALTCHOICE

## 2022-08-10 ENCOUNTER — HOSPITAL ENCOUNTER (OUTPATIENT)
Dept: GENERAL RADIOLOGY | Age: 27
Discharge: HOME | End: 2022-08-12
Payer: COMMERCIAL

## 2022-08-10 ENCOUNTER — HOSPITAL ENCOUNTER (OUTPATIENT)
Age: 27
Discharge: HOME | End: 2022-08-12
Payer: COMMERCIAL

## 2022-08-10 DIAGNOSIS — M54.2 NECK PAIN: ICD-10-CM

## 2022-08-10 DIAGNOSIS — M54.6 THORACIC BACK PAIN, UNSPECIFIED BACK PAIN LATERALITY, UNSPECIFIED CHRONICITY: ICD-10-CM

## 2022-08-10 DIAGNOSIS — M25.559 HIP PAIN: ICD-10-CM

## 2022-08-10 DIAGNOSIS — M54.50 LOW BACK PAIN, UNSPECIFIED BACK PAIN LATERALITY, UNSPECIFIED CHRONICITY, UNSPECIFIED WHETHER SCIATICA PRESENT: ICD-10-CM

## 2022-08-10 PROCEDURE — 72170 X-RAY EXAM OF PELVIS: CPT

## 2022-08-10 PROCEDURE — 72072 X-RAY EXAM THORAC SPINE 3VWS: CPT

## 2022-08-10 PROCEDURE — 72050 X-RAY EXAM NECK SPINE 4/5VWS: CPT

## 2022-08-10 PROCEDURE — 72100 X-RAY EXAM L-S SPINE 2/3 VWS: CPT

## 2022-10-04 ENCOUNTER — HOSPITAL ENCOUNTER (OUTPATIENT)
Dept: PHYSICAL THERAPY | Facility: CLINIC | Age: 27
Setting detail: THERAPIES SERIES
Discharge: HOME | End: 2022-10-04
Payer: COMMERCIAL

## 2022-10-04 NOTE — FLOWSHEET NOTE
[] [facility]) [facility]  [address]    [phone]  F: [phone]   [x] [address]  [facility] [address]   [address]  P: [phone]  F: [phone]  [] [address]  [address]  P: [phone]  F: [phone] [] [address]  P: [phone]  F: [phone]  [] [address]  [address] [facility] [address]   [address]   [place]: [phone]  F: [phone]   [] [facility]  [address] [facility] [address]  [place]: [phone]   F: [phone]     Physical Therapy Cancel/No Show note    Date: 10/4/2022  Patient: Lorena Banks  : 1995  MRN: [id number]    Cancels/No Shows to date:     For today's appointment patient:    [x]  Cancelled    [x] Rescheduled appointment    [] No-show     Reason given by patient:    []  Patient ill    []  Conflicting appointment    [] No transportation      [x] Conflict with work    [] No reason given    [] Weather related    [] COVID-19    [] Other:      Comments:        [x] Next appointment was confirmed    Electronically signed by: Gino Maciel PT

## 2022-10-11 ENCOUNTER — HOSPITAL ENCOUNTER (OUTPATIENT)
Dept: PHYSICAL THERAPY | Facility: CLINIC | Age: 27
Setting detail: THERAPIES SERIES
Discharge: HOME | End: 2022-10-11
Payer: COMMERCIAL

## 2022-10-11 PROCEDURE — 97161 PT EVAL LOW COMPLEX 20 MIN: CPT

## 2022-10-11 PROCEDURE — 97110 THERAPEUTIC EXERCISES: CPT

## 2022-10-11 NOTE — CONSULTS
[] [facility]) - [facility] &  Therapy  [address]  [phone]  F: [phone] [x] [address]  [facility]   [address]  P: [phone]  F: [phone] [] [address] &  Therapy  [address]  P: [phone]  F: [phone] [] [address]  P: [phone]  F: [phone] [] [address]  [facility]   [address]   [place]: [phone]  F: [phone]          Physical Therapy Spine Evaluation    Date:  10/11/2022  Patient: Luis E Hernadez  : 1995  MRN: [id number]  Physician: Jeremias Hernandez DC  Insurance: Jada Stanton ( Auth After 30vs)  Medical Diagnosis:   M54.12 (ICD-10-CM) - Radiculopathy, cervical region  M54.2 (ICD-10-CM) - Cervicalgia  M99.01 (ICD-10-CM) - Segmental and somatic dysfunction of cervical region  Rehab Codes: M25.60 , M25.611, M25.612, M25.511, M54.2, R29.3, M62.81   Onset Date: 22  Next 's appt.: TBD    Subjective:   Pt arrived to PT with c/o neck pain and intermittent radicular symptoms to L palm of hand occurred about 2 months ago. (+) tension headache and radicular symptoms to L palm of hand once neck pain is worsen towards the end of the day. Pt noted neck pain is constant, increase with movements and poor posture, noted unable to find position of comfort. Pt reported went to Chiropractor 3x to get adjustment with short-term relief, noted got most relief and able to move neck better the 1st time. Pt reported cont to go to Chiropractor at this time. Pt reported HP and massage gun failed to relieve pain and muscle tightness/stiffness long-term. Pt noted OTC Tylenol failed to manage headache.   Pt noted went to neurosurgeon in the past d/t constant headache/migraine and was prescribed with migraine meds but failed to resolve headache symptom. Pain present? [x] Yes  [] No    Location  Neck pain    Pain Rating currently  (0-10 scale)   4/10   Pain at worse 9/10   Pain at best 3/10   Description of pain \"Pulling\" pain    Altered Sensation Intermittent radicular symptoms to L palm    What makes it worse Sitting in poor posture    What makes it better None    Symptom progression About the same    Sleep Disturbed        Comorbidities:   [x] Obesity [] Dialysis  [x] N/A   [] Asthma/COPD [] Dementia [] Other:   [] Stroke [] Sleep apnea [] Other:   [] Vascular disease [] Rheumatic disease [] Other:         Past Medical History:   Diagnosis Date    HTN (hypertension)     does not take meds    Obesity     PCOS (polycystic ovarian syndrome)          Tests:   [x] X-Ray:     Impression   1. No acute abnormality involving the cervical spine. Impression   No acute radiographic abnormality of the thoracic spine trace early   midthoracic degenerative endplate spurring. 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 [x]  Normal duty   [] Light duty   [] Off due to condition    []  Retired   [] Not employed   [] Disability  [] Other:  []  Return to work:    Work Activities/duties  Prolonged sitting and walking    Recreational   Activities ---       ADL/IADL Previous level of function Current level of function Who currently assists the patient with task   Bathing  [x] Independent  [] Assist [x] Independent  [] Assist    Dress/grooming [x] Independent  [] Assist [x] Independent  [] Assist    Transfer/mobility [x] Independent  [] Assist [x] Independent  [] Assist    Feeding [x] Independent  [] Assist [x] Independent  [] Assist    Toileting [x] Independent  [] Assist [x] Independent  [] Assist    Driving [x] Independent  [] Assist [x] Independent  [] Assist    Housekeeping [x] Independent  [] Assist [x] Independent  [] Assist    Grocery shop/meal prep [x] Independent  [] Assist [x] Independent  [] Assist        Gait Prior level of function Current level of function    [x] Independent  [] Assist [x] Independent  [] Assist   Device: [x] Independent [x] Independent    [] Straight Cane [] Quad cane [] Straight Cane [] Quad cane    [] Standard walker [] Rolling walker   [] 4 wheeled walker [] Standard walker [] Rolling walker   [] 4 wheeled walker    [] Wheelchair [] Wheelchair         Objective:    OBSERVATION No Deficit Deficit Not Tested Comments   Posture       Forward Head [] [x] []    Rounded Shoulders [] [x] []    Kyphosis [] [x] []    Lordosis [] [] [x]    Leg Length Discrp [] [] [x]    Slumped Sitting [] [x] []    Palpation [] [x] [] High tone throughout B upper trap/levator scap, suboccipitals  Scapular winging   Sensation [] [x] [] Pt reported numbness/tingling to L palm of hand however is not provoked at this time   Edema [x] [] []    Neurological [x] [] []              STRENGTH    Left Right   C5 Shld Abd 4-/5 4-/5   Shld Flexion 3+/5 3+/5   Shld IR 4+/5 4/5   Shld ER 4+/5 4/5        C6 Elb Flex 5/5 5/5   C7 Elb Ext 5/5 5/5   C8 EPL     T1 Fing Abd               *painful       ROM   Cervical    Flexion WFL (+) \"pulling\" pain    Extension 15% impaired (+) \"pulling\" pain/tightness   Rotation L 10% limited  (+) \"pulling\" pain/tightness R 10% limited   (+) \"pulling\" pain/tightness   Sidebend L 10% limited  (+) tightness/numbness L upper trap R 10% limited  (+) tightness   Retraction      Comment: pt reported muscle tightness in B upper trap with all cervical ROM L>R  (-) ULTT 1-4  (-) Painful arc with (+) B upper trap compensation at end-range   (-) Cervical Compression/Distraction       Functional Test: NDI Score: 16/50 or 32% functionally impaired         Assessment: 32 yo female presents to physical therapy with c/o insidious onset of neck pain with intermittent radicular symptoms to LUE and headache towards the end of the day once neck pain is worsen. Pt demo reduced cervical AROM in extension, B rot, and B SB, decreased BUE and scapulothoracic stabilizers strength with palpable and visible scapular winging. Pt also demo impaired posture with forward head rounded shoulder causing weakness in deep neck flexors and increased muscle tightness in B upper trap/levator scap and suboccipitals musculatures. Unremarkable findings with cervical compression/distraction, ULTT 1-4, and painful arc with B upper trap compensation at end-range abduction presents. Patient would benefit from skilled physical therapy services in order to: manage pain and muscle tightness, improve cervical ROM, improve BUE and scapulothoracic stabilizers strength, and promote upright posture to assist pt in returning to prior function. Problems:    [x] ? Pain: 3-9/10 neck w/ intermittent radicular symptoms to L palm of hand & headache   [x] ? ROM: cervical extension, B rot, B SB  [x] ? Strength: BUE globally, scapulothoracic stabilizers, deep neck flexors   [x] ?  Function: NDI 32% impaired   [x] Other: impaired posture         STG: (to be met in 7 treatments)  Pt will reduce pain to less than or equal to 5/10 with ADLs  Pt will demo improved cervical ext/B rot/B SB AROM to < 10% limited with reduced c/o muscle tightness/pain to ease difficulty with driving  Pt will improve B shoulder flexors/abductors strength to at least 4/5 without B upper trap compensation to reduce difficulty with overhead activities   Pt will be able to perform 20x scapular retraction with min to no c/o increased pain/tightness in L upper trap to demo improved scapular rhythm and controlled mobility    Pt will be able to perform 20x chin tuck in sitting/supine with min no no c/o inc radicular symptoms to LUE to demo improved functional strength and endurance of deep neck flexors   Patient to be independent with home exercise program as demonstrated by performance with correct form without cues. Demonstrate Knowledge of fall prevention    LTG: (to be met in 12 treatments)  Pt will improve NDI score from 32% impaired to less than 22% impaired to demo improved functional mobility to participate in daily functional activities  Pt will improve BUE strength to 5/5 globally to participate in school-related activities  Pt will be able to maintain upright posture without cues for at least 20 mins to demo improved postural awareness       Patient goals: \"be able to move my neck and shoulder without pain\"     Rehab Potential:  [x] Good  [] Fair  [] Poor   Suggested Professional Referral:  [x] No  [] Yes:  Barriers to Goal Achievement:  [x] No  [] Yes:  Domestic Concerns:  [x] No  [] Yes:      Pt. Education:  [x] Plans/Goals, Risks/Benefits discussed  [x] Home exercise program  Method of Education: [x] Verbal  [x] Demo  [x] Written  Comprehension of Education:  [x] Verbalizes understanding. [x] Demonstrates understanding. [] Needs Review. [] Demonstrates/verbalizes understanding of HEP/Ed previously given. Access Code: [email or web address]  URL: [email or web address]  Date: 10/11/2022  Prepared by:  Lety Roman    Exercises  Seated Levator Scapulae Stretch - 1 x daily - 7 x weekly - 1 sets - 3 reps - 30s hold  Seated Upper Trapezius Stretch - 1 x daily - 7 x weekly - 1 sets - 3 reps - 30s hold  Seated Cervical Retraction - 1 x daily - 7 x weekly - 1 sets - 10 reps - 3s hold  Supine Cervical Retraction with Towel - 1 x daily - 7 x weekly - 1 sets - 10 reps - 3s hold  Correct Seated Posture - 1 x daily - 7 x weekly - 2 sets - 10 reps - 5s hold  Sidelying Open Book Thoracic Lumbar Rotation and Extension - 1 x daily - 7 x weekly - 2 sets - 10 reps        Treatment Plan:  [x] Therapeutic Exercise   26414  [] Iontophoresis: 4 mg/mL Dexamethasone Sodium Phosphate  mAmin  25541   [] Therapeutic Activity  43044 [] Vasopneumatic cold with compression  73759    [] Gait Training   73423 [] Ultrasound   [id number]   [] Neuromuscular Re-education  90733 [] Electrical Stimulation Unattended  15553   [x] Manual Therapy  48778 [] Electrical Stimulation Attended  66874   [x] Instruction in HEP  [x] Lumbar/Cervical Traction  81366   [] Aquatic Therapy   55635 [x] Cold/hotpack    [] Massage   08709      [] Dry Needling, 1 or 2 muscles  00111   [] Biofeedback, first 15 minutes   88824  [] Biofeedback, additional 15 minutes   64608 [] Dry Needling, 3 or more muscles  39484         Frequency:  2 x/week for 12 visits        Todays Treatment:  Modalities:   Precautions:  Exercises:  Exercise Reps/ Time Weight/ Level Comments         Seated       B upper trap/levator scap stretch 30s ea      Chin tuck  10x5s  (+) pain/numbness L upper trap to L upper arm - modify next visit as needed    Scap retraction 10x5s                  Postural education       Other:    Specific Instructions for next treatment:  - Improve BUE and scapulothoracic stabilizers strength and mobility  - Manual (trigger point release, MFR, STM) to B upper trap L>R and SOR to reduce muscular tension   - Review postural education  - HP/CP as needed  - Trials of cervical distraction (mechanical or manual) if radicular symptoms worsen or when necessary   - Provide & discuss fall prevention intervention next visit       Evaluation Complexity:  History (Personal factors, comorbidities) [] 0 [] 1-2 [x] 3+   Exam (limitations, restrictions) [] 1-2 [] 3 [x] 4+   Clinical presentation (progression) [x] Stable [] Evolving  [] Unstable   Decision Making [x] Low [] Moderate [] High    [x] Low Complexity [] Moderate Complexity [] High Complexity       Treatment Charges:

## 2022-10-11 NOTE — FLOWSHEET NOTE
[facility] Fall Risk Assessment    Patient Name:  Betty Hyman  : 1995    Risk Factor Scale  Score   History of Falls [x] Yes  [] No 25  0 25   Secondary Diagnosis [] Yes  [x] No 15  0 0   Ambulatory Aid [] Furniture  [] Crutches/cane/walker  [x] None/bedrest/wheelchair/nurse 30  15  0 0   IV/Heparin Lock [] Yes  [x] No 20  0 0   Gait/Transferring [] Impaired  [] Weak  [x] Normal/bedrest/immobile 20  10  0 0   Mental Status [] Forgets limitations  [x] Oriented to own ability 15  0 0      Total: 25     Based on the Assessment score: check the appropriate box. []  No intervention needed   Low =   Score of 0-24    [x]  Use standard prevention interventions Moderate =  Score of 24-44   [x] Give patient handout and discuss fall prevention strategies   [x] Establish goal of education for patient/family RE: fall prevention strategies    []  Use high risk prevention interventions High = Score of 45 and higher   [] Give patient handout and discuss fall prevention strategies   [] Establish goal of education for patient/family Re: fall prevention strategies   [] Discuss lifeline / other resources    Electronically signed by:    Antonette Ferro  Date: 10/11/2022

## 2022-10-12 ENCOUNTER — NURSE TRIAGE (OUTPATIENT)
Dept: OTHER | Facility: CLINIC | Age: 27
End: 2022-10-12

## 2022-10-12 NOTE — TELEPHONE ENCOUNTER
Received call from [facility] COSTA DOMINGUEZ, caller not on line. Complaint: bilateral leg and feet swelling    Practice Name: wants established at [facility] telephone number verified as [phone]    Unsuccessful attempt to re-connect with caller via phone, left message for return call to office    Reason for Disposition   Message left on unidentified voice mail. Phone number verified.     Protocols used: No Contact or Duplicate Contact Call-ADULT-[place]

## 2022-10-13 ENCOUNTER — HOSPITAL ENCOUNTER (OUTPATIENT)
Dept: PHYSICAL THERAPY | Facility: CLINIC | Age: 27
Setting detail: THERAPIES SERIES
Discharge: HOME | End: 2022-10-13
Payer: COMMERCIAL

## 2022-10-13 PROCEDURE — 97140 MANUAL THERAPY 1/> REGIONS: CPT

## 2022-10-13 PROCEDURE — 97110 THERAPEUTIC EXERCISES: CPT

## 2022-10-13 NOTE — FLOWSHEET NOTE
[] [facility] [address]  [address]  [phone]  F: [phone] [x] [address]  [facility] 36   [address]  P: [phone]  F: [phone] [] [address]  [address]  P: [phone]  F: [phone] [] [address]  P: [phone]  F: [phone] [] [address]  [facility]   [address]   [place]: [phone]  F: [phone]      Physical Therapy Daily Treatment Note    Date:  10/13/2022  Patient Name:  Zoraida Dahl    :  1995  MRN: [id number]  Physician: Loletta Goodell, DC                   Insurance: Sanjuanita Angel ( Auth After 30vs)  Medical Diagnosis:   M54.12 (ICD-10-CM) - Radiculopathy, cervical region  M54.2 (ICD-10-CM) - Cervicalgia  M99.01 (ICD-10-CM) - Segmental and somatic dysfunction of cervical region  Rehab Codes: M25.60 , M25.611, M25.612, M25.511, M54.2, R29.3, M62.81   Onset Date: 22             Next 's appt.: TBD  Visit# / total visits: 2/    Cancels/No Shows: 0/0        Subjective:    Pain:  [x] Yes  [] No Location: L-sided neck to L shoulder Pain Rating: (0-10 scale) 7/10  Pain altered Tx:  [x] No  [] Yes  Action:  Comments: Pt arrived to physical therapy with c/o increased pain in L-sided neck radiated to L shoulder rated 7/10 without radicular symptoms to R hand/fingers. 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 Pt noted somewhat compliant with HEP however had pelvic and sacral fracture at 16years old thus did not do open book due to afraid pain will increase with rotational movements.             Objective:  Modalities: 8 mins MHP pre-exs to B upper trap in SUPINE - 10/13  Precautions:  Exercise Reps/ Time Weight/ Level Comments             Seated          B upper trap/levator scap stretch 3x 30s ea        Chin tuck  10x5s   (+) pain/numbness L upper trap to L upper arm - modify next visit as needed    Scap retraction 10x5s                        Sidelying      B abduction/flex  20x   To 90 deg          Standing      W lift off 20x   Face against wall   Shoulder row/ext 20x  Lime                           Postural education  x       Other:     Manual Therapy: IASTM with hawk- for 5 mins then MFR manually to B upper trap L>R in sitting - 10 mins total       Specific Instructions for next treatment:  - Improve BUE and scapulothoracic stabilizers strength and mobility  - Manual (trigger point release, MFR, STM) to B upper trap L>R and SOR to reduce muscular tension   - Review postural education  - HP/CP as needed  - Trials of cervical distraction (mechanical or manual) if radicular symptoms worsen or when necessary   - Provide & discuss fall prevention intervention next visit         Treatment Charges: Mins Units   [x]  Modalities 8 --   [x]  Ther Exercise 35 2   [x]  Manual Therapy 10 1   []  Ther Activities     []  Aquatics     []  Vasocompression     []  Other     Total Treatment time 45 3         Assessment: [] Progressing toward goals. [] No change. [x] Other: Pt completed 2nd therapy visit with initiation of MHP to B upper trap/cervical paraspinals in supine for 10 mins to reduce muscular tension followed by 10 mins of manual to decrease muscle guarding of B upper trap L>R. Time spent educated pt on proper lifting technique with use of BUE and encourage to increase awareness of compensating with B upper trap when carrying/lifting objects to reduce muscle guarding. Pt verbalized understanding. Pt reported \"I'm in between\" after manual therapy completed.   Pt able to complete sidelying B shoulder abduction and flexion to 90° with cues provided for proper technique. Pt reported increased muscle soreness without c/o increased pain with sidelying exs this date. Pt reported noticeable \"muscle pulling\" in levator scapulae with W lift off but able to tolerate and complete. Pt was educated on proper posture and benefits of HP/CP application for pain and muscle soreness management after exs. Pt verbalized understanding. Will monitor symptoms and modify treatment as able in upcoming visits. [x] Patient would continue to benefit from skilled physical therapy services in order to: manage pain and muscle tightness, improve cervical ROM, improve BUE and scapulothoracic stabilizers strength, and promote upright posture to assist pt in returning to prior function. Problems:    [x] ? Pain: 3-9/10 neck w/ intermittent radicular symptoms to L palm of hand & headache   [x] ? ROM: cervical extension, B rot, B SB  [x] ? Strength: BUE globally, scapulothoracic stabilizers, deep neck flexors   [x] ? Function: NDI 32% impaired   [x] Other: impaired posture       STG: (to be met in 7 treatments)  Pt will reduce pain to less than or equal to 5/10 with ADLs  Pt will demo improved cervical ext/B rot/B SB AROM to < 10% limited with reduced c/o muscle tightness/pain to ease difficulty with driving  Pt will improve B shoulder flexors/abductors strength to at least 4/5 without B upper trap compensation to reduce difficulty with overhead activities   Pt will be able to perform 20x scapular retraction with min to no c/o increased pain/tightness in L upper trap to demo improved scapular rhythm and controlled mobility    Pt will be able to perform 20x chin tuck in sitting/supine with min no no c/o inc radicular symptoms to LUE to demo improved functional strength and endurance of deep neck flexors   Patient to be independent with home exercise program as demonstrated by performance with correct form without cues.   Demonstrate Knowledge of fall prevention     LTG: (to be met in 12 treatments)  Pt will improve NDI score from 32% impaired to less than 22% impaired to demo improved functional mobility to participate in daily functional activities  Pt will improve BUE strength to 5/5 globally to participate in school-related activities  Pt will be able to maintain upright posture without cues for at least 20 mins to demo improved postural awareness         Patient goals: \"be able to move my neck and shoulder without pain\"         Pt. Education:  [] Yes  [] No  [x] Reviewed Prior HEP/Ed  Method of Education: [] Verbal  [] Demo  [] Written  Comprehension of Education:  [] Verbalizes understanding. [] Demonstrates understanding. [] Needs review. [x] Demonstrates/verbalizes HEP/Ed previously given. Access Code: [email or web address]  URL: [email or web address]  Date: 10/11/2022  Prepared by: Lety Roman     Exercises  Seated Levator Scapulae Stretch - 1 x daily - 7 x weekly - 1 sets - 3 reps - 30s hold  Seated Upper Trapezius Stretch - 1 x daily - 7 x weekly - 1 sets - 3 reps - 30s hold  Seated Cervical Retraction - 1 x daily - 7 x weekly - 1 sets - 10 reps - 3s hold  Supine Cervical Retraction with Towel - 1 x daily - 7 x weekly - 1 sets - 10 reps - 3s hold  Correct Seated Posture - 1 x daily - 7 x weekly - 2 sets - 10 reps - 5s hold  Sidelying Open Book Thoracic Lumbar Rotation and Extension - 1 x daily - 7 x weekly - 2 sets - 10 reps       Plan: [x] Continue current frequency toward long and short term goals. [] Specific Instructions for subsequent treatments:       Time In: 6:05 pm             Time Out: 7:00 pm     Electronically signed by:   Lety Cardoso, PT

## 2022-10-18 ENCOUNTER — HOSPITAL ENCOUNTER (OUTPATIENT)
Dept: PHYSICAL THERAPY | Facility: CLINIC | Age: 27
Setting detail: THERAPIES SERIES
Discharge: HOME | End: 2022-10-18
Payer: COMMERCIAL

## 2022-10-18 NOTE — FLOWSHEET NOTE
[] [facility]) [facility] &  Therapy  [address]    [phone]  F: [phone]   [x] [address]  [facility] 36   [address]  P: [phone]  F: [phone]  [] [address]  [address]  P: [phone]  F: [phone] [] [address]  P: [phone]  F: [phone]  [] [address]  [address] [address] [facility] [address]   [address]   [place]: [phone]  F: [phone]   [] [facility]  [address]  [place]: [phone]   F: [phone]     Physical Therapy Cancel/No Show note    Date: 10/18/2022  Patient: Guillaume Mccracken  : 1995  MRN: [id number]    Cancels/No Shows to date:     For today's appointment patient:    [x]  Cancelled    [] Rescheduled appointment    [] No-show     Reason given by patient:    [x]  Patient ill    []  Conflicting appointment    [] No transportation      [] Conflict with work    [] No reason given    [] Weather related    [] [id number]    [] Other:      Comments:        [x] Next appointment was confirmed    Electronically signed by: Padmini Sutton PTA

## 2022-10-20 ENCOUNTER — HOSPITAL ENCOUNTER (OUTPATIENT)
Dept: PHYSICAL THERAPY | Facility: CLINIC | Age: 27
Setting detail: THERAPIES SERIES
Discharge: HOME | End: 2022-10-20
Payer: COMMERCIAL

## 2022-10-20 NOTE — FLOWSHEET NOTE
[] [facility]) [facility] &  Therapy  [address]    [phone]  F: [phone]   [x] [address]  [facility] 36   [address]  P: [phone]  F: [phone]  [] [address]  [address] [facility]  P: [phone]  F: [phone] [] [address]  P: [phone]  F: [phone]  [] [address]  [address] [address] [facility] [address]   [address]   [place]: [phone]  F: [phone]   [] [place] [address] [facility] [address]  [place]: [phone]   F: [phone]     Physical Therapy Cancel/No Show note    Date: 10/20/2022  Patient: Nhi Winchester  : 1995  MRN: [id number]    Cancels/No Shows to date: 3/0    For today's appointment patient:    [x]  Cancelled    [] Rescheduled appointment    [] No-show     Reason given by patient:    [x]  Patient ill    []  Conflicting appointment    [] No transportation      [] Conflict with work    [] No reason given    [] Weather related    [] COVID-19    [] Other:      Comments:        [x] Next appointment was confirmed    Electronically signed by:  Lety Cervantes PT

## 2022-10-25 ENCOUNTER — APPOINTMENT (OUTPATIENT)
Dept: PHYSICAL THERAPY | Facility: CLINIC | Age: 27
End: 2022-10-25
Payer: COMMERCIAL

## 2022-10-27 ENCOUNTER — HOSPITAL ENCOUNTER (OUTPATIENT)
Dept: PHYSICAL THERAPY | Facility: CLINIC | Age: 27
Setting detail: THERAPIES SERIES
Discharge: HOME | End: 2022-10-27
Payer: COMMERCIAL

## 2022-10-27 ENCOUNTER — TELEPHONE (OUTPATIENT)
Dept: BARIATRICS/WEIGHT MGMT | Age: 27
End: 2022-10-27

## 2022-10-27 PROCEDURE — 97110 THERAPEUTIC EXERCISES: CPT

## 2022-10-27 PROCEDURE — 97140 MANUAL THERAPY 1/> REGIONS: CPT

## 2022-10-27 NOTE — TELEPHONE ENCOUNTER
Patient is scheduled for gastric sleeve on 12/5/22. Patient wants to know if she can fly to [place] on 12/26? Please advise.

## 2022-10-27 NOTE — FLOWSHEET NOTE
[] [facility].  [address]  [phone]  F: [phone] [x] [address]  [facility]   [address]  P: [phone]  F: [phone] [] [address]  [address]  P: [phone]  F: [phone] [] [address]  P: [phone]  F: [phone] [] [address]  [facility]   [address]   [place]: [phone]  F: [phone]      Physical Therapy Daily Treatment Note    Date:  10/27/2022  Patient Name:  Tanya Jaramillo    :  1995  MRN: [id number]  Physician: Timothy Luwdig DC                   Insurance: [employer] ( Auth After 30vs)  Medical Diagnosis:   M54.12 (ICD-10-CM) - Radiculopathy, cervical region  M54.2 (ICD-10-CM) - Cervicalgia  M99.01 (ICD-10-CM) - Segmental and somatic dysfunction of cervical region  Rehab Codes: M25.60 , M25.611, M25.612, M25.511, M54.2, R29.3, M62.81   Onset Date: 22             Next 's appt.: TBD  Visit# / total visits: 3/12    Cancels/No Shows: 3/0        Subjective:    Pain:  [x] Yes  [] No  Location: R-sided neck to R shoulder   Pain Rating: (0-10 scale) 7/10  Pain altered Tx:  [x] No  [] Yes  Action:  Comments: Pt arrived to physical therapy without change in pain level. Pt stated have been working a lot due to short staff, noted have been completing HEP and work on posture with somewhat improvement noted when staying in improved posture. Pt reported however having difficulty maintain proper posture throughout the day due to having to hold phone with head tilted to 1 side for prolonged period of time and/or sit in a very low chair causing inc in 'slump' posture. 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 Objective:  Modalities: 8 mins MHP pre-exs to B upper trap in SUPINE - 10/13  Precautions:  Exercise Reps/ Time Weight/ Level Comments             Seated          B upper trap/levator scap stretch 3x 30s ea        Chin tuck  10x5s   (+) pain/numbness L upper trap to L upper arm - modify next visit as needed; not today    Scap retraction 10x5s        Spinal ext + pec stretch 10x5s    New 10/27   Horizontal abd 2x10 Lime  New 10/27   B ER  2x10 Lime  New 10/27-c/o inc pain in L ant shoulder          Sidelying      B abduction/flex  20x   To 90 deg; not today          Standing      W lift off 20x   Face against wall   Shoulder row/ext 20x  Lime                           Postural education  x       Other:     Manual Therapy: IASTM with hawk- for 5 mins then MFR manually to B upper trap L>R in sitting - 10 mins total   10/27: Hypervolt to R upper trap for 10 mins pre-exs in seated       Specific Instructions for next treatment:  - Improve BUE and scapulothoracic stabilizers strength and mobility  - Manual (trigger point release, MFR, STM) to B upper trap L>R and SOR to reduce muscular tension   - Review postural education  - HP/CP as needed  - Trials of cervical distraction (mechanical or manual) if radicular symptoms worsen or when necessary   - Provide & discuss fall prevention intervention next visit         Treatment Charges: Mins Units   []  Modalities     [x]  Ther Exercise 30 2   [x]  Manual Therapy 10 1   []  Ther Activities     []  Aquatics     []  Vasocompression     []  Other     Total Treatment time 40 3         Assessment: [] Progressing toward goals. [] No change. [x] Other:  Initiated therapy session with 10 mins MFR via hypervolt to R upper trap focus on reducing muscular tension with somewhat improvement noted upon completion. Time spent educating pt on work ergonomics in attempt to promote proper sitting posture at work and while completing school work at home.   Pt reported \"will try my best\". Added horizontal abduction and B ER with lime theraband and cues pt for proper scapular retraction. Pt c/o inc pain in L ant shoulder towards the end of horizontal abduction therefore consider to hold this exs if pain persist next visit. Intermittent verbal and tactile cues provided throughout shoulder ext/row to reduce B upper trap compensation with fair carryover noted. Will cont to progress as able. [x] Patient would continue to benefit from skilled physical therapy services in order to: manage pain and muscle tightness, improve cervical ROM, improve BUE and scapulothoracic stabilizers strength, and promote upright posture to assist pt in returning to prior function. Problems:    [x] ? Pain: 3-9/10 neck w/ intermittent radicular symptoms to L palm of hand & headache   [x] ? ROM: cervical extension, B rot, B SB  [x] ? Strength: BUE globally, scapulothoracic stabilizers, deep neck flexors   [x] ? Function: NDI 32% impaired   [x] Other: impaired posture       STG: (to be met in 7 treatments)  Pt will reduce pain to less than or equal to 5/10 with ADLs  Pt will demo improved cervical ext/B rot/B SB AROM to < 10% limited with reduced c/o muscle tightness/pain to ease difficulty with driving  Pt will improve B shoulder flexors/abductors strength to at least 4/5 without B upper trap compensation to reduce difficulty with overhead activities   Pt will be able to perform 20x scapular retraction with min to no c/o increased pain/tightness in L upper trap to demo improved scapular rhythm and controlled mobility    Pt will be able to perform 20x chin tuck in sitting/supine with min no no c/o inc radicular symptoms to LUE to demo improved functional strength and endurance of deep neck flexors   Patient to be independent with home exercise program as demonstrated by performance with correct form without cues.   Demonstrate Knowledge of fall prevention     LTG: (to be met in 12 treatments)  Pt will improve NDI score from 32% impaired to less than 22% impaired to demo improved functional mobility to participate in daily functional activities  Pt will improve BUE strength to 5/5 globally to participate in school-related activities  Pt will be able to maintain upright posture without cues for at least 20 mins to demo improved postural awareness         Patient goals: \"be able to move my neck and shoulder without pain\"         Pt. Education:  [x] Yes  [] No  [] Reviewed Prior HEP/Ed  Method of Education: [x] Verbal - posture  [] Demo  [] Written  Comprehension of Education:  [] Verbalizes understanding. [] Demonstrates understanding. [] Needs review. [x] Demonstrates/verbalizes HEP/Ed previously given. Access Code: [email or web address]  URL: [email or web address]  Date: 10/11/2022  Prepared by: Lety Roman     Exercises  Seated Levator Scapulae Stretch - 1 x daily - 7 x weekly - 1 sets - 3 reps - 30s hold  Seated Upper Trapezius Stretch - 1 x daily - 7 x weekly - 1 sets - 3 reps - 30s hold  Seated Cervical Retraction - 1 x daily - 7 x weekly - 1 sets - 10 reps - 3s hold  Supine Cervical Retraction with Towel - 1 x daily - 7 x weekly - 1 sets - 10 reps - 3s hold  Correct Seated Posture - 1 x daily - 7 x weekly - 2 sets - 10 reps - 5s hold  Sidelying Open Book Thoracic Lumbar Rotation and Extension - 1 x daily - 7 x weekly - 2 sets - 10 reps       Plan: [x] Continue current frequency toward long and short term goals. [] Specific Instructions for subsequent treatments:       Time In: 6:00 pm             Time Out: 6:40 pm     Electronically signed by:   Lety Jacob, PT

## 2022-10-31 ASSESSMENT — ENCOUNTER SYMPTOMS
CHEST TIGHTNESS: 0
DIARRHEA: 0
ABDOMINAL PAIN: 0
CONSTIPATION: 0
SINUS PRESSURE: 0
COLOR CHANGE: 0
SHORTNESS OF BREATH: 0

## 2022-10-31 NOTE — PROGRESS NOTES
Austin Brian is a 33 y.o. female who presents in office today with Self establish new care with office. Previous PCP was Dr Enriqueta Resendiz    Chief Complaint   Patient presents with    Edema     Lower extremities      Nodule     neck    Procedure     Having bariatric surgery 12/5/22 with Dr. Adri Murray, [address] [facility] Maintenance     Patient has not received 2nd Covid booster, but will think about getting it in the near future. History of Present Illness:     HPI    Here to establish care. Blood pressure very high today. Stressed trying to call  to wake him up for work. Blood pressure was controlled with weight loss. Since having weight gain, blood pressure has increased over the last few visits. She has had her blood pressure treated in the past and thinks was on 5 mg amlodipine. Planning for bariatric surgery with Dr Adri Murray 12/5/22. Labs done in February 2022. Did not have sleep study as she has no signs of sleep apnea. Has been on dairy- and gluten-free diet in the past with weight loss and improvement in abdominal upset. Concern for mild bilateral leg swelling. Has tried cutting back on sodium intake. Seems to fluctuate. History of left sprained ankle. Does have nodule on neck, which has been there at least one year. Seems to vary in size. Thought lymph node. Had ultrasound and biopsy 1 year ago at [facility], uncertain cause. Wanted follow up in one year. Has been offered genetic testing for strong family history of uterine or ovarian (?) cancer, but declines. Does not want to know. Seeing physical therapy currently for chronic back pain and occasional left leg weakness. PT has helped. Sees therapist for generalized anxiety. Working and going to school full time. Declines need for medication treatment at this time. Care gaps: COVID-19 vaccine:    Moderna x3  Colon CA screening:   n/a  Vaccines:   Hepatitis C/HIV screens:    discussed - low risk  Breast CA screening:   n/a  OB/GYN, cervical CA screenin22 negative, Dr Sri Canales  Depression Screenin    Health Maintenance Due   Topic Date Due    HPV vaccine (1 - 2-dose series) Never done    HIV screen  Never done    Hepatitis C screen  Never done    COVID-19 Vaccine (4 - Booster for Moderna series) 12/15/2021    Flu vaccine (1) 2022        Patient Care Team:  TRAMAINE Zapien - CNP as PCP - General (Nurse Practitioner)    Reviewed     [x] Past Medical, Family, and Social History was reviewed per writer and does contribute to the patient presenting condition. [x] Laboratory Results, Vital signs, Imaging, Active Problems, Immunizations, Current/Recently Discontinued Medications, Health Maintenance Activities Due, Referral Notes (if available) were reviewed per writer     [x] Reviewed Depression screening if taken or valid today or any other valid screening tool (others seen below) Interpretation of Total Score DepressionSeverity: 1-4 = Minimal depression, 5-9 = Mild depression, 10-14 = Moderate depression, 15-19 = Moderately severe depression, 20-27 =Severe depression    PHQ Scores 11/3/2022   PHQ2 Score 0   PHQ9 Score 0     Interpretation of Total Score Depression Severity: 1-4 = Minimal depression, 5-9 = Mild depression, 10-14 = Moderate depression, 15-19 = Moderately severe depression, 20-27 = Severe depression     Review of Systems (Subjective)     Review of Systems   Constitutional:  Negative for activity change, appetite change and unexpected weight change. HENT:  Negative for congestion and sinus pressure. Neck nodule   Respiratory:  Negative for chest tightness and shortness of breath. Cardiovascular:  Positive for leg swelling. Negative for chest pain and palpitations. Gastrointestinal:  Negative for abdominal pain, constipation and diarrhea. Genitourinary:  Negative for difficulty urinating and dysuria.    Musculoskeletal:  Negative for arthralgias and myalgias. Skin:  Negative for color change and rash. Neurological:  Negative for dizziness, numbness and headaches. Psychiatric/Behavioral:  Negative for dysphoric mood and sleep disturbance. The patient is not nervous/anxious. See HPI     Physical Assessment (Objective)     BP (!) 146/88 (Site: Left Lower Arm, Position: Sitting, Cuff Size: Medium Adult)   Pulse 85   Ht 4' 9\" (1.448 m)   Wt 261 lb (118.4 kg)   BMI 56.48 kg/m²      Physical Exam  Vitals reviewed. Constitutional:       Appearance: She is obese. HENT:      Head: Normocephalic and atraumatic. Right Ear: External ear normal.      Left Ear: External ear normal.      Nose: Nose normal.      Mouth/Throat:      Mouth: Mucous membranes are moist.   Eyes:      Extraocular Movements: Extraocular movements intact. Conjunctiva/sclera: Conjunctivae normal.      Comments: Wears glasses   Neck:      Comments: <1 cm subcutaneous mass consistent with previously noted nodule under chin above thyroid  Cardiovascular:      Rate and Rhythm: Normal rate and regular rhythm. Pulses: Normal pulses. Heart sounds: Normal heart sounds. No murmur heard. Pulmonary:      Effort: Pulmonary effort is normal. No respiratory distress. Breath sounds: Normal breath sounds. Abdominal:      General: Bowel sounds are normal.      Palpations: Abdomen is soft. Musculoskeletal:         General: No swelling. Normal range of motion. Cervical back: Normal range of motion and neck supple. Skin:     General: Skin is warm and dry. Capillary Refill: Capillary refill takes less than 2 seconds. Neurological:      Mental Status: She is alert and oriented to person, place, and time. Motor: No weakness. Gait: Gait normal.   Psychiatric:         Mood and Affect: Mood normal.         Behavior: Behavior normal.         Thought Content: Thought content normal.         Judgment: Judgment normal.       Diagnoses / Plan:   1.  Encounter to establish care  -     CBC with Auto Differential; Future  -     Comprehensive Metabolic Panel, Fasting; Future  -     Hemoglobin A1C; Future  -     TSH with Reflex; Future  -     Vitamin D 25 Hydroxy; Future  2. Primary hypertension  -     CBC with Auto Differential; Future  -     Comprehensive Metabolic Panel, Fasting; Future  -     Hemoglobin A1C; Future  -     TSH with Reflex; Future  -     Vitamin D 25 Hydroxy; Future  -     hydroCHLOROthiazide (MICROZIDE) 12.5 MG capsule; Take 1 capsule by mouth every morning, Disp-30 capsule, R-0Normal  3. Morbid obesity with BMI of 50.0-59.9, adult (HCC)  -     CBC with Auto Differential; Future  -     Comprehensive Metabolic Panel, Fasting; Future  -     Hemoglobin A1C; Future  -     TSH with Reflex; Future  -     Vitamin D 25 Hydroxy; Future  4. Neck nodule  -     US HEAD NECK SOFT TISSUE THYROID; Future  5. Generalized anxiety disorder  6. Fatigue, unspecified type  -     CBC with Auto Differential; Future  -     Comprehensive Metabolic Panel, Fasting; Future  -     Hemoglobin A1C; Future  -     TSH with Reflex; Future  -     Vitamin D 25 Hydroxy; Future  7. Vitamin D deficiency  -     Vitamin D 25 Hydroxy; Future     Will start antihypertensive today and follow up in 2 weeks for BP check and pre-op clearance. PAT appointment scheduled 11/21/22. Understanding and agreement was voiced with all above plans. All questions answered to satisfaction. Encouraged healthy diet and exercise. Call office with any questions or new or worsening symptoms or concerns. Return in about 2 weeks (around 11/17/2022) for BP check. Electronically signed by TRAMAINE Murray CNP on 11/3/2022 at 12:42 PM    Note is dictated utilizing voice recognition software. Unfortunately this leads to occasional typographical errors. Please contact our office if you have any questions.

## 2022-11-02 ENCOUNTER — HOSPITAL ENCOUNTER (OUTPATIENT)
Dept: PHYSICAL THERAPY | Facility: CLINIC | Age: 27
Setting detail: THERAPIES SERIES
Discharge: HOME | End: 2022-11-02
Payer: COMMERCIAL

## 2022-11-02 PROCEDURE — 97110 THERAPEUTIC EXERCISES: CPT

## 2022-11-02 PROCEDURE — 97140 MANUAL THERAPY 1/> REGIONS: CPT

## 2022-11-02 NOTE — FLOWSHEET NOTE
[] [facility].  [address]  [phone]  F: [phone] [x] [address]  [facility] 36   [address]  P: [phone]  F: [phone] [] [address]  [address]  P: [phone]  F: [phone] [] [address]  P: [phone]  F: [phone] [] [address]  [facility]   [address]   [place]: [phone]  F: [phone]      Physical Therapy Daily Treatment Note    Date:  2022  Patient Name:  Syeda Denton    :  1995  MRN: [id number]  Physician: Emery High DC                   Insurance: [employer] ( Auth After 30vs)  Medical Diagnosis:   M54.12 (ICD-10-CM) - Radiculopathy, cervical region  M54.2 (ICD-10-CM) - Cervicalgia  M99.01 (ICD-10-CM) - Segmental and somatic dysfunction of cervical region  Rehab Codes: M25.60 , M25.611, M25.612, M25.511, M54.2, R29.3, M62.81   Onset Date: 22             Next 's appt.: TBD  Visit# / total visits:     Cancels/No Shows: 3/0      Subjective:    Pain:  [x] Yes  [] No  Location: R-sided neck to R shoulder   Pain Rating: (0-10 scale) 7/10  Pain altered Tx:  [x] No  [] Yes  Action:  Comments: Pt arrived to physical therapy with c/o \"pinching\" pain in cervical spine and \"tight\" pain in B upper trap rated 7/10. Pt noted have been trying to improve posture in sitting but unable to at work d/t sitting in low chair. Pt reported have not been to Chiropractor this month.        Objective:  Modalities: 8 mins MHP pre-exs to B upper trap in SUPINE - 10/13  Precautions:   Bold = completed 22   Exercise Reps/ Time Weight/ Level Comments             Seated          B upper trap/levator scap stretch 3x30s ea        Chin tuck  10x5s   (+) pain/numbness L upper trap to L upper arm - modify next visit as needed; not today    Scap retraction 10x5s        Spinal ext + pec stretch 10x5s    New 10/27   Horizontal abd 2x10 Lime  New 10/27   B ER  2x10 Lime  New 10/27-c/o inc pain in L ant shoulder    Shoulder roll 20x ea   New 11/22               Sidelying      B abduction/flex  20x   To 90 deg; not today          Supine       C flex/ext/rot 10x ea  2.2#ball          Prone       Y/T/A 2x10 ea   New 11/2         Standing      W lift off 20x   Face against wall   Shoulder row/ext 20x  Blue   Progressed 11/2   Carry weight w/ walking 30ftx2 ea 8# ea arm New 11/2-on the side, on shoulder height, overhead                    Postural education  x       Other:     Manual Therapy: IASTM with hawk- for 5 mins then MFR manually to B upper trap L>R in sitting - 10 mins total   10/27: Hypervolt to R upper trap for 10 mins pre-exs in seated   11/2: MFR (manual) to B upper trap, C-paraspinals, SOR - 10 mins      Specific Instructions for next treatment:  - Improve BUE and scapulothoracic stabilizers strength and mobility  - Manual (trigger point release, MFR, STM) to B upper trap L>R and SOR to reduce muscular tension   - Review postural education  - HP/CP as needed  - Trials of cervical distraction (mechanical or manual) if radicular symptoms worsen or when necessary   - Provide & discuss fall prevention intervention next visit - completed 11/2        Treatment Charges: Mins Units   [x]  Modalities-MHP 8 --   [x]  Ther Exercise 35 2   [x]  Manual Therapy 10 1   []  Ther Activities     []  Aquatics     []  Vasocompression     []  Other     Total Treatment time 45 3         Assessment: [] Progressing toward goals. [] No change. [x] Other:  began session with 10 mins manual focus on SOR and MFR to B upper trap and along cervical paraspinals to reduce muscular tension.   Initiated prone Y/T/A with cues provided for proper arm placement in attempt to reduce straining of cervical musculatures. Added walking while carrying weight at different body height with cues provided throughout to reduce B upper trap compensation. Attempted chin tuck in standing with pillow behind head but pt c/o inc \"pinching\" pain in base of skull thus hold this exs this date. End session with MHP to B upper trap post-exs to reduce muscle guarding. [x] Patient would continue to benefit from skilled physical therapy services in order to: manage pain and muscle tightness, improve cervical ROM, improve BUE and scapulothoracic stabilizers strength, and promote upright posture to assist pt in returning to prior function. Problems:    [x] ? Pain: 3-9/10 neck w/ intermittent radicular symptoms to L palm of hand & headache   [x] ? ROM: cervical extension, B rot, B SB  [x] ? Strength: BUE globally, scapulothoracic stabilizers, deep neck flexors   [x] ? Function: NDI 32% impaired   [x] Other: impaired posture       STG: (to be met in 7 treatments)  Pt will reduce pain to less than or equal to 5/10 with ADLs  Pt will demo improved cervical ext/B rot/B SB AROM to < 10% limited with reduced c/o muscle tightness/pain to ease difficulty with driving  Pt will improve B shoulder flexors/abductors strength to at least 4/5 without B upper trap compensation to reduce difficulty with overhead activities   Pt will be able to perform 20x scapular retraction with min to no c/o increased pain/tightness in L upper trap to demo improved scapular rhythm and controlled mobility    Pt will be able to perform 20x chin tuck in sitting/supine with min no no c/o inc radicular symptoms to LUE to demo improved functional strength and endurance of deep neck flexors   Patient to be independent with home exercise program as demonstrated by performance with correct form without cues.   Demonstrate Knowledge of fall prevention     LTG: (to be met in 12 treatments)  Pt will improve NDI score from 32% impaired to less than 22% impaired to demo improved functional mobility to participate in daily functional activities  Pt will improve BUE strength to 5/5 globally to participate in school-related activities  Pt will be able to maintain upright posture without cues for at least 20 mins to demo improved postural awareness         Patient goals: \"be able to move my neck and shoulder without pain\"         Pt. Education:  [x] Yes  [] No  [] Reviewed Prior HEP/Ed  Method of Education: [x] Verbal - posture  [] Demo  [] Written  Comprehension of Education:  [x] Verbalizes understanding. [] Demonstrates understanding. [] Needs review. [] Demonstrates/verbalizes HEP/Ed previously given. Access Code: [email or web address]  URL: [email or web address]  Date: 10/11/2022  Prepared by: Lety Roman     Exercises  Seated Levator Scapulae Stretch - 1 x daily - 7 x weekly - 1 sets - 3 reps - 30s hold  Seated Upper Trapezius Stretch - 1 x daily - 7 x weekly - 1 sets - 3 reps - 30s hold  Seated Cervical Retraction - 1 x daily - 7 x weekly - 1 sets - 10 reps - 3s hold  Supine Cervical Retraction with Towel - 1 x daily - 7 x weekly - 1 sets - 10 reps - 3s hold  Correct Seated Posture - 1 x daily - 7 x weekly - 2 sets - 10 reps - 5s hold  Sidelying Open Book Thoracic Lumbar Rotation and Extension - 1 x daily - 7 x weekly - 2 sets - 10 reps       Plan: [x] Continue current frequency toward long and short term goals. [] Specific Instructions for subsequent treatments:       Time In: 2:00 pm             Time Out: 2:58 pm     Electronically signed by:   Lety Acosta PT

## 2022-11-03 ENCOUNTER — OFFICE VISIT (OUTPATIENT)
Dept: FAMILY MEDICINE CLINIC | Age: 27
End: 2022-11-03
Payer: COMMERCIAL

## 2022-11-03 ENCOUNTER — NURSE ONLY (OUTPATIENT)
Dept: BARIATRICS/WEIGHT MGMT | Age: 27
End: 2022-11-03

## 2022-11-03 VITALS
BODY MASS INDEX: 56.31 KG/M2 | DIASTOLIC BLOOD PRESSURE: 88 MMHG | SYSTOLIC BLOOD PRESSURE: 146 MMHG | HEART RATE: 85 BPM | WEIGHT: 261 LBS | HEIGHT: 57 IN

## 2022-11-03 DIAGNOSIS — E55.9 VITAMIN D DEFICIENCY: ICD-10-CM

## 2022-11-03 DIAGNOSIS — Z76.89 ENCOUNTER TO ESTABLISH CARE: Primary | ICD-10-CM

## 2022-11-03 DIAGNOSIS — F41.1 GENERALIZED ANXIETY DISORDER: ICD-10-CM

## 2022-11-03 DIAGNOSIS — I10 PRIMARY HYPERTENSION: ICD-10-CM

## 2022-11-03 DIAGNOSIS — R53.83 FATIGUE, UNSPECIFIED TYPE: ICD-10-CM

## 2022-11-03 DIAGNOSIS — E66.01 MORBID OBESITY WITH BMI OF 50.0-59.9, ADULT (HCC): ICD-10-CM

## 2022-11-03 DIAGNOSIS — R22.1 NECK NODULE: ICD-10-CM

## 2022-11-03 PROBLEM — M54.16 LUMBAR RADICULOPATHY: Status: ACTIVE | Noted: 2020-12-17

## 2022-11-03 PROBLEM — G43.109 MIGRAINE WITH AURA: Status: ACTIVE | Noted: 2021-01-12

## 2022-11-03 PROCEDURE — 3078F DIAST BP <80 MM HG: CPT | Performed by: NURSE PRACTITIONER

## 2022-11-03 PROCEDURE — 99205 OFFICE O/P NEW HI 60 MIN: CPT | Performed by: NURSE PRACTITIONER

## 2022-11-03 PROCEDURE — 3074F SYST BP LT 130 MM HG: CPT | Performed by: NURSE PRACTITIONER

## 2022-11-03 RX ORDER — HYDROCHLOROTHIAZIDE 12.5 MG/1
12.5 CAPSULE, GELATIN COATED ORAL EVERY MORNING
Qty: 30 CAPSULE | Refills: 0 | Status: SHIPPED | OUTPATIENT
Start: 2022-11-03 | End: 2022-11-21

## 2022-11-03 SDOH — ECONOMIC STABILITY: TRANSPORTATION INSECURITY
IN THE PAST 12 MONTHS, HAS THE LACK OF TRANSPORTATION KEPT YOU FROM MEDICAL APPOINTMENTS OR FROM GETTING MEDICATIONS?: NO

## 2022-11-03 SDOH — ECONOMIC STABILITY: TRANSPORTATION INSECURITY
IN THE PAST 12 MONTHS, HAS LACK OF TRANSPORTATION KEPT YOU FROM MEETINGS, WORK, OR FROM GETTING THINGS NEEDED FOR DAILY LIVING?: NO

## 2022-11-03 SDOH — ECONOMIC STABILITY: FOOD INSECURITY: WITHIN THE PAST 12 MONTHS, THE FOOD YOU BOUGHT JUST DIDN'T LAST AND YOU DIDN'T HAVE MONEY TO GET MORE.: NEVER TRUE

## 2022-11-03 SDOH — ECONOMIC STABILITY: HOUSING INSECURITY
IN THE LAST 12 MONTHS, WAS THERE A TIME WHEN YOU DID NOT HAVE A STEADY PLACE TO SLEEP OR SLEPT IN A SHELTER (INCLUDING NOW)?: NO

## 2022-11-03 SDOH — ECONOMIC STABILITY: INCOME INSECURITY: IN THE LAST 12 MONTHS, WAS THERE A TIME WHEN YOU WERE NOT ABLE TO PAY THE MORTGAGE OR RENT ON TIME?: NO

## 2022-11-03 SDOH — ECONOMIC STABILITY: FOOD INSECURITY: WITHIN THE PAST 12 MONTHS, YOU WORRIED THAT YOUR FOOD WOULD RUN OUT BEFORE YOU GOT MONEY TO BUY MORE.: NEVER TRUE

## 2022-11-03 ASSESSMENT — PATIENT HEALTH QUESTIONNAIRE - PHQ9
SUM OF ALL RESPONSES TO PHQ9 QUESTIONS 1 & 2: 0
SUM OF ALL RESPONSES TO PHQ QUESTIONS 1-9: 0
1. LITTLE INTEREST OR PLEASURE IN DOING THINGS: 0
SUM OF ALL RESPONSES TO PHQ QUESTIONS 1-9: 0
2. FEELING DOWN, DEPRESSED OR HOPELESS: 0

## 2022-11-03 ASSESSMENT — SOCIAL DETERMINANTS OF HEALTH (SDOH): HOW HARD IS IT FOR YOU TO PAY FOR THE VERY BASICS LIKE FOOD, HOUSING, MEDICAL CARE, AND HEATING?: NOT HARD AT ALL

## 2022-11-04 ENCOUNTER — HOSPITAL ENCOUNTER (OUTPATIENT)
Age: 27
Setting detail: SPECIMEN
Discharge: HOME | End: 2022-11-04

## 2022-11-04 DIAGNOSIS — E66.01 MORBID OBESITY WITH BMI OF 50.0-59.9, ADULT (HCC): ICD-10-CM

## 2022-11-04 DIAGNOSIS — E55.9 VITAMIN D DEFICIENCY: ICD-10-CM

## 2022-11-04 DIAGNOSIS — Z76.89 ENCOUNTER TO ESTABLISH CARE: ICD-10-CM

## 2022-11-04 DIAGNOSIS — I10 PRIMARY HYPERTENSION: ICD-10-CM

## 2022-11-04 DIAGNOSIS — R53.83 FATIGUE, UNSPECIFIED TYPE: ICD-10-CM

## 2022-11-04 LAB
ABSOLUTE EOS #: 0.14 K/UL (ref 0–0.44)
ABSOLUTE IMMATURE GRANULOCYTE: 0.03 K/UL (ref 0–0.3)
ABSOLUTE LYMPH #: 1.86 K/UL (ref 1.1–3.7)
ABSOLUTE MONO #: 0.51 K/UL (ref 0.1–1.2)
ALBUMIN SERPL-MCNC: 3.8 G/DL (ref 3.5–5.2)
ALBUMIN/GLOBULIN RATIO: 1 (ref 1–2.5)
ALP BLD-CCNC: 94 U/L (ref 35–104)
ALT SERPL-CCNC: 23 U/L (ref 5–33)
ANION GAP SERPL CALCULATED.3IONS-SCNC: 13 MMOL/L (ref 9–17)
AST SERPL-CCNC: 28 U/L
BASOPHILS # BLD: 0 % (ref 0–2)
BASOPHILS ABSOLUTE: 0.03 K/UL (ref 0–0.2)
BILIRUB SERPL-MCNC: 0.9 MG/DL (ref 0.3–1.2)
BUN BLDV-MCNC: 18 MG/DL (ref 6–20)
CALCIUM SERPL-MCNC: 8.8 MG/DL (ref 8.6–10.4)
CHLORIDE BLD-SCNC: 102 MMOL/L (ref 98–107)
CO2: 21 MMOL/L (ref 20–31)
CREAT SERPL-MCNC: 0.46 MG/DL (ref 0.5–0.9)
EOSINOPHILS RELATIVE PERCENT: 2 % (ref 1–4)
ESTIMATED AVERAGE GLUCOSE: 100 MG/DL
GFR SERPL CREATININE-BSD FRML MDRD: >60 ML/MIN/1.73M2
GLUCOSE FASTING: 97 MG/DL (ref 70–99)
HBA1C MFR BLD: 5.1 % (ref 4–6)
HCT VFR BLD CALC: 40.3 % (ref 36.3–47.1)
HEMOGLOBIN: 13.2 G/DL (ref 11.9–15.1)
IMMATURE GRANULOCYTES: 0 %
LYMPHOCYTES # BLD: 21 % (ref 24–43)
MCH RBC QN AUTO: 27.6 PG (ref 25.2–33.5)
MCHC RBC AUTO-ENTMCNC: 32.8 G/DL (ref 28.4–34.8)
MCV RBC AUTO: 84.3 FL (ref 82.6–102.9)
MONOCYTES # BLD: 6 % (ref 3–12)
NRBC AUTOMATED: 0 PER 100 WBC
PDW BLD-RTO: 12.5 % (ref 11.8–14.4)
PLATELET # BLD: 331 K/UL (ref 138–453)
PMV BLD AUTO: 9.9 FL (ref 8.1–13.5)
POTASSIUM SERPL-SCNC: 4.7 MMOL/L (ref 3.7–5.3)
RBC # BLD: 4.78 M/UL (ref 3.95–5.11)
SEG NEUTROPHILS: 71 % (ref 36–65)
SEGMENTED NEUTROPHILS ABSOLUTE COUNT: 6.39 K/UL (ref 1.5–8.1)
SODIUM BLD-SCNC: 136 MMOL/L (ref 135–144)
TOTAL PROTEIN: 7.7 G/DL (ref 6.4–8.3)
TSH SERPL DL<=0.05 MIU/L-ACNC: 1.98 UIU/ML (ref 0.3–5)
VITAMIN D 25-HYDROXY: 20.9 NG/ML
WBC # BLD: 9 K/UL (ref 3.5–11.3)

## 2022-11-08 ENCOUNTER — HOSPITAL ENCOUNTER (OUTPATIENT)
Dept: ULTRASOUND IMAGING | Age: 27
Discharge: HOME | End: 2022-11-10
Payer: COMMERCIAL

## 2022-11-08 ENCOUNTER — HOSPITAL ENCOUNTER (OUTPATIENT)
Dept: PHYSICAL THERAPY | Facility: CLINIC | Age: 27
Setting detail: THERAPIES SERIES
Discharge: HOME | End: 2022-11-08
Payer: COMMERCIAL

## 2022-11-08 DIAGNOSIS — R22.1 NECK NODULE: ICD-10-CM

## 2022-11-08 PROCEDURE — 76536 US EXAM OF HEAD AND NECK: CPT

## 2022-11-10 ENCOUNTER — HOSPITAL ENCOUNTER (OUTPATIENT)
Dept: PHYSICAL THERAPY | Facility: CLINIC | Age: 27
Setting detail: THERAPIES SERIES
Discharge: HOME | End: 2022-11-10
Payer: COMMERCIAL

## 2022-11-10 PROCEDURE — 97140 MANUAL THERAPY 1/> REGIONS: CPT

## 2022-11-10 PROCEDURE — 97110 THERAPEUTIC EXERCISES: CPT

## 2022-11-10 NOTE — FLOWSHEET NOTE
[] [facility] [address] [facility]  [address]  [phone]  F: [phone] [x] [address]  [facility]   [address]  P: [phone]  F: [phone] [] [address]  [address]  P: [phone]  F: [phone] [] [address]  P: [phone]  F: [phone] [] [address]  [facility]   [address]   [place]: [phone]  F: [phone]      Physical Therapy Daily Treatment Note    Date:  11/10/2022  Patient Name:  Aldo Eric    :  1995  MRN: [id number]  Physician: Cristel Jaimes DC                   Insurance: [employer] ( Auth After 30vs)  Medical Diagnosis:   M54.12 (ICD-10-CM) - Radiculopathy, cervical region  M54.2 (ICD-10-CM) - Cervicalgia  M99.01 (ICD-10-CM) - Segmental and somatic dysfunction of cervical region  Rehab Codes: M25.60 , M25.611, M25.612, M25.511, M54.2, R29.3, M62.81   Onset Date: 22             Next 's appt.: TBD  Visit# / total visits:     Cancels/No Shows: 4/0      Subjective:    Pain:  [x] Yes  [] No  Location: R-sided neck to R shoulder   Pain Rating: (0-10 scale) 6-7/10  Pain altered Tx:  [x] No  [] Yes  Action:  Comments: Pt reports she continues to have moderate to high levels pain daily. Pt notes she does have headaches frequently as well.          Objective:  Modalities: 8 mins MHP pre-exs to B upper trap in SUPINE - 10/13  Precautions:   Cleotha Crease = completed 22   Exercise Reps/ Time Weight/ Level Comments             Seated          B upper trap/levator scap stretch 3x30s ea        Chin tuck  10x5s   (+) pain/numbness L upper trap to L upper arm - modify next visit as needed; not today    Scap retraction 10x5s        Spinal ext + pec stretch 10x5s    New 10/27   Horizontal abd 2x10 Lime  New 10/27   B ER  2x10 Lime  New 10/27-c/o inc pain in L ant shoulder    Shoulder roll 20x ea   New 11/22               Sidelying      B abduction/flex  20x   To 90 deg; not today          Supine       C flex/ext/rot 10x ea  2.2#ball          Prone       Y/T/A 2x10 ea   New 11/2         Standing      W lift off 20x   Face against wall   Shoulder row/ext 20x  Blue   Progressed 11/2   Carry weight w/ walking 30ftx2 ea 8# ea arm New 11/2-on the side, on shoulder height, overhead    Doorway pec stretch  3x20\"  Added 11/10    Flexion wall slides  10x5\"    Added 11/10          Postural education  x       Other:     Manual Therapy: IASTM with hawk- for 5 mins then MFR manually to B upper trap L>R in sitting - 10 mins total   10/27: Hypervolt to R upper trap for 10 mins pre-exs in seated   11/10: MFR (manual) to B upper trap, C-paraspinals, SOR - 25 mins        Specific Instructions for next treatment:  - Improve BUE and scapulothoracic stabilizers strength and mobility  - Manual (trigger point release, MFR, STM) to B upper trap L>R and SOR to reduce muscular tension   - Review postural education  - HP/CP as needed  - Trials of cervical distraction (mechanical or manual) if radicular symptoms worsen or when necessary   - Provide & discuss fall prevention intervention next visit - completed 11/2        Treatment Charges: Mins Units   [x]  Modalities-MHP 8 --   [x]  Ther Exercise 22 1   [x]  Manual Therapy 25 2   []  Ther Activities     []  Aquatics     []  Vasocompression     []  Other     Total Treatment time 47 3         Assessment: [x] Progressing toward goals. Initiated treatment with MHP in supine followed by MFR to B trap, SOR, and light traction. Focused on improving muscular extensibility this date. Pt reports slight increase in pain with flexion wall slides and doorway pec stretch however, is able to complete reps asked of her. Reviewed HEP with good understanding.   Pt request to be on hold from therapy till after her procedure. [] No change. [] Other:    [x] Patient would continue to benefit from skilled physical therapy services in order to: manage pain and muscle tightness, improve cervical ROM, improve BUE and scapulothoracic stabilizers strength, and promote upright posture to assist pt in returning to prior function. Problems:    [x] ? Pain: 3-9/10 neck w/ intermittent radicular symptoms to L palm of hand & headache   [x] ? ROM: cervical extension, B rot, B SB  [x] ? Strength: BUE globally, scapulothoracic stabilizers, deep neck flexors   [x] ? Function: NDI 32% impaired   [x] Other: impaired posture       STG: (to be met in 7 treatments)  Pt will reduce pain to less than or equal to 5/10 with ADLs  Pt will demo improved cervical ext/B rot/B SB AROM to < 10% limited with reduced c/o muscle tightness/pain to ease difficulty with driving  Pt will improve B shoulder flexors/abductors strength to at least 4/5 without B upper trap compensation to reduce difficulty with overhead activities   Pt will be able to perform 20x scapular retraction with min to no c/o increased pain/tightness in L upper trap to demo improved scapular rhythm and controlled mobility    Pt will be able to perform 20x chin tuck in sitting/supine with min no no c/o inc radicular symptoms to LUE to demo improved functional strength and endurance of deep neck flexors   Patient to be independent with home exercise program as demonstrated by performance with correct form without cues.   Demonstrate Knowledge of fall prevention     LTG: (to be met in 12 treatments)  Pt will improve NDI score from 32% impaired to less than 22% impaired to demo improved functional mobility to participate in daily functional activities  Pt will improve BUE strength to 5/5 globally to participate in school-related activities  Pt will be able to maintain upright posture without cues for at least 20 mins to demo improved postural awareness         Patient goals: [place]  able to move my neck and shoulder without pain\"         Pt. Education:  [x] Yes  [] No  [x] Reviewed Prior HEP/Ed  Method of Education: [x] Verbal - posture  [] Demo  [] Written  Comprehension of Education:  [x] Verbalizes understanding. [] Demonstrates understanding. [] Needs review. [x] Demonstrates/verbalizes HEP/Ed previously given. Access Code: [email or web address]  URL: [email or web address]  Date: 10/11/2022  Prepared by: Lety Roman     Exercises  Seated Levator Scapulae Stretch - 1 x daily - 7 x weekly - 1 sets - 3 reps - 30s hold  Seated Upper Trapezius Stretch - 1 x daily - 7 x weekly - 1 sets - 3 reps - 30s hold  Seated Cervical Retraction - 1 x daily - 7 x weekly - 1 sets - 10 reps - 3s hold  Supine Cervical Retraction with Towel - 1 x daily - 7 x weekly - 1 sets - 10 reps - 3s hold  Correct Seated Posture - 1 x daily - 7 x weekly - 2 sets - 10 reps - 5s hold  Sidelying Open Book Thoracic Lumbar Rotation and Extension - 1 x daily - 7 x weekly - 2 sets - 10 reps       Plan: [x] Continue current frequency toward long and short term goals. [x] Specific Instructions for subsequent treatments: to be on hold till after procedure.        Time In: 6:00 pm             Time Out: 6:55 pm     Electronically signed by:  Art Link PTA

## 2022-11-17 RX ORDER — SODIUM CHLORIDE, SODIUM LACTATE, POTASSIUM CHLORIDE, CALCIUM CHLORIDE 600; 310; 30; 20 MG/100ML; MG/100ML; MG/100ML; MG/100ML
1000 INJECTION, SOLUTION INTRAVENOUS CONTINUOUS
OUTPATIENT
Start: 2022-11-17

## 2022-11-17 NOTE — DISCHARGE INSTRUCTIONS
Preoperative Instructions:    Stop drinking clear liquids at midnight the night prior to surgery. **gatorade per surgeon. (Follow bowel prep instructions if instructed by your surgeon.)    Jillian Almaraz at the surgery center (Entrance B) by 7:20 on 12/5/2022  (or as directed by your surgeon's office). Please stop any blood thinning medications as directed by your surgeon or prescribing physician. Failure to stop certain medications may interfere with your scheduled surgery. These may include:  Aspirin, Warfarin (Coumadin), Clopidogrel (Plavix), Ibuprofen (Motrin, Advil), Naproxen (Aleve), Meloxicam (Mobic), Celecoxib (Celebrex), Eliquis, Pradaxa, Xarelto, Effient, Fish Oil, Herbal supplements. You may continue the rest of your medications through the night before surgery unless instructed otherwise. Please take only the following medication(s) the day of surgery with a small sip of water:    Please use and bring inhalers the day of surgery. Please bring CPAP the day of surgery. ____________________________  ____________________________  Signature (Patient)           Signature/date(Provider)      REMINDERS:  ** If you are going home the day of your procedure, you will need a friend or family member to drive you home after your procedure. Your  must be 25years of age or older and able to sign off on your discharge instructions. Taxi cabs or any form of public transportation is not acceptable. ** It is preferable that the friend or family member stay at the hospital throughout your procedure. ** If you are going home the same day as your procedure, someone must remain with you for the first 24 hours after your surgery if you receive anesthesia or sedation. If you do not have someone to stay with you, your procedure may be cancelled. **  Please do not wear any jewelry or body piercings the day of surgery.     PREPARING FOR YOUR SURGERY:     Before surgery, you can play an important role in your own health. Because skin is not sterile, we need to be sure that your skin is as free of germs as possible before surgery by carefully washing before surgery. Preparing or prepping skin before surgery can reduce the risk of a surgical site infection.   Do not shave the area of your body where your surgery will be performed unless you received specific permission from your physician. You will need to shower at home the night before surgery and the morning of surgery with a special soap called chlorhexidine gluconate (CHG*). *Not to be used by people allergic to Chlorhexidine Gluconate (CHG). Following these instructions will help you be sure that your skin is clean before surgery. Instructions on cleaning your skin before surgery: The night before your surgery: You will need to shower with warm water (not hot) and the CHG soap. Use a clean wash cloth and a clean towel. Have clean clothes available to put on after the shower. First wash your hair with regular shampoo. Rinse your hair and body thoroughly to remove the shampoo. Wash your face with your regular soap or water only. Thoroughly rinse your body with warm water from the neck down. Turn water off to prevent rinsing the soap off too soon. With a clean wet washcloth and half of the CHG soap in the bottle, lather your entire body from the neck down. Do not use CHG soap near your eyes or ears to avoid injury to those areas. Wash thoroughly, paying special attention to the area where your surgery will be performed. Wash your body gently for five (5) minutes. Avoid scrubbing your skin too hard. Turn the water back on and rinse your body thoroughly. Pat yourself dry with a clean, soft towel. Do not apply lotion, cream or powder. Dress with clean freshly washed clothes. The morning of surgery:    Repeat shower following steps above  - using remaining half of CHG soap in bottle.      If you have any questions, call the Pre-Admission Testing Unit at [phone]. Day of Surgery/Procedure    As a patient at [facility] you can expect quality medical and nursing care that is centered on your individual needs. Our goal is to make your surgical experience as comfortable as possible  . Directions to the [address] [place] [address] is located at [address] [place], [address] Please pull into the Emergency [address] parking lot (Entrance B) and park in that lot. We also have additional parking across the street. You will enter the facility following the [address] sign. Please stop at the reception desk where you will be checked in by the staff. If you have any questions please call [phone]. Transportation after your procedure. You will need a friend or family member to drive you home after your procedure. Your  must be 25years of age or older and able to sign off on your discharge instructions. Taxi cabs or any form of public transportation is not acceptable. It is preferable that the friend or family member stay at the hospital throughout your procedure. Someone must remain at home with you for the first 24 hours after your surgery if you receive anesthesia or sedation. If you do not have someone to stay with you, your procedure may be cancelled. Patient Instructions    If you are having any type of anesthesia you are to have nothing to eat or drink after midnight the night before your surgery. This includes gum, mints, water or smoking or chewing tobacco.  The only exception to this is a small sip of water to take with any morning dose of heart, blood pressure, or seizure medications. Bring a list of all medications you take, along with the dose of the medications and how often you take it. If more convenient bring the pharmacy bottles in a zip lock bag.       Please shower the night before and the morning of surgery with an antibacterial soap. Please use the wipes given to you the night before your surgery after your shower. Unless otherwise told by your physician, please do not shave legs or any part of your body below your neck the night before or day of your surgery. You may shave your face or neck. Brush your teeth but do not swallow water. Bring your inhaler if you are currently using one. Bring your eyeglasses and case with you. No contacts are to be worn the day of surgery. You also may bring your hearing aids. Bring your blood band if one has been given to you. Please do not close the clasp. If you are on C-PAP or Bi-PAP at home and plan on staying in the hospital overnight for your surgery please bring the machine with you. Do not wear any jewelry or body piercings day of surgery. Also, NO lotion, perfume or deodorant to be used the day of surgery. Do not bring any valuables, such as jewelry, cash or credit cards. If you are staying overnight with us, please bring a SMALL bag of personal items. We cannot accommodate large items, like suitcases. Please wear loose, comfortable clothing. If you are potentially going to have a cast or brace bring clothing that will fit over them. In case of illness - If you have cold or flu like symptoms (high fever, runny nose, sore throat, cough, etc.) rash, nausea, vomiting, loose stools, and/or recent contact with someone who has a contagious disease (chicken pox, measles, etc.) Please call your doctor before coming to the hospital.      If your child is having surgery please make arrangements for any other children to be cared for at home on the day of surgery. Other children are not permitted in recovery room and we want you to be able to spend time with the patient.   If other arrangements are not available then we suggest that you have a second adult to stay in the waiting room.       If you have any other questions regarding your procedure or the day of surgery, please call [phone], or [phone]

## 2022-11-21 ENCOUNTER — HOSPITAL ENCOUNTER (OUTPATIENT)
Dept: GENERAL RADIOLOGY | Age: 27
Discharge: HOME | End: 2022-11-23
Payer: COMMERCIAL

## 2022-11-21 ENCOUNTER — HOSPITAL ENCOUNTER (OUTPATIENT)
Dept: PREADMISSION TESTING | Age: 27
Discharge: HOME | End: 2022-11-25
Payer: COMMERCIAL

## 2022-11-21 ENCOUNTER — TELEPHONE (OUTPATIENT)
Dept: BARIATRICS/WEIGHT MGMT | Age: 27
End: 2022-11-21

## 2022-11-21 VITALS
HEART RATE: 90 BPM | SYSTOLIC BLOOD PRESSURE: 146 MMHG | RESPIRATION RATE: 18 BRPM | TEMPERATURE: 98.3 F | BODY MASS INDEX: 54.8 KG/M2 | OXYGEN SATURATION: 96 % | HEIGHT: 57 IN | DIASTOLIC BLOOD PRESSURE: 96 MMHG | WEIGHT: 254 LBS

## 2022-11-21 LAB
INR BLD: 1
PARTIAL THROMBOPLASTIN TIME: 24.5 SEC (ref 20.5–30.5)
PROTHROMBIN TIME: 10.4 SEC (ref 9.1–12.3)

## 2022-11-21 PROCEDURE — G0480 DRUG TEST DEF 1-7 CLASSES: HCPCS

## 2022-11-21 PROCEDURE — 85610 PROTHROMBIN TIME: CPT

## 2022-11-21 PROCEDURE — 85730 THROMBOPLASTIN TIME PARTIAL: CPT

## 2022-11-21 PROCEDURE — 36415 COLL VENOUS BLD VENIPUNCTURE: CPT

## 2022-11-21 PROCEDURE — 71046 X-RAY EXAM CHEST 2 VIEWS: CPT

## 2022-11-21 PROCEDURE — 93005 ELECTROCARDIOGRAM TRACING: CPT | Performed by: SURGERY

## 2022-11-21 RX ORDER — ETONOGESTREL AND ETHINYL ESTRADIOL 11.7; 2.7 MG/1; MG/1
1 INSERT, EXTENDED RELEASE VAGINAL SEE ADMIN INSTRUCTIONS
COMMUNITY

## 2022-11-21 RX ORDER — ACETAMINOPHEN 500 MG
1000 TABLET ORAL EVERY 6 HOURS PRN
COMMUNITY

## 2022-11-21 RX ORDER — HEPARIN SODIUM 5000 [USP'U]/ML
5000 INJECTION, SOLUTION INTRAVENOUS; SUBCUTANEOUS ONCE
OUTPATIENT
Start: 2022-11-21 | End: 2022-11-21

## 2022-11-21 NOTE — H&P (VIEW-ONLY)
History and Physical    Pt Name: Melissa Mina  MRN: [id number]  YOB: 1995  Date of evaluation: 11/21/2022    SUBJECTIVE:   History of Chief Complaint:    Patient presents for PAT appointment. She complains of obesity, difficulty with ability to lose weight and maintain weight loss. 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 She has tried several programs in the past without lasting success. She has several comorbid conditions that would be helped with weight loss. She has been scheduled for sleeve gastrectomy. Past Medical History    has a past medical history of ADHD (attention deficit hyperactivity disorder), Headache, HTN (hypertension), Obesity, PCOS (polycystic ovarian syndrome), and Wellness examination. Past Surgical History   has a past surgical history that includes Upper gastrointestinal endoscopy (N/A, 03/04/2022). Medications  Prior to Admission medications    Medication Sig Start Date End Date Taking? Authorizing Provider   etonogestrel-ethinyl estradiol (NUVARING) 0.12-0.015 MG/24HR vaginal ring Place 1 each vaginally See Admin Instructions   Yes Historical Provider, MD   acetaminophen (TYLENOL) 500 MG tablet Take 1,000 mg by mouth every 6 hours as needed for Pain   Yes Historical Provider, MD   Multiple Vitamin (MULTIVITAMIN ADULT PO) Take by mouth    Historical Provider, MD     Allergies  has No Known Allergies. Family History  family history includes Cancer in her maternal aunt, maternal grandmother, mother, and paternal grandmother; Diabetes in her maternal grandmother; High Blood Pressure in her father and mother; Migraines in her mother; Obesity in her father and mother. Social History   reports that she has never smoked. She has been exposed to tobacco smoke. She has never used smokeless tobacco.   reports that she does not currently use alcohol. reports no history of drug use.   Marital Status   Occupation [employer] [place], [place] for bariatric program (Dr. Rinku Dey). Review of Systems:  CONSTITUTIONAL:   negative for fevers, chills, fatigue and malaise    EYES:   negative for double vision, blurred vision and photophobia    HEENT:   negative for tinnitus, epistaxis and sore throat     RESPIRATORY:   negative for cough, shortness of breath, wheezing     CARDIOVASCULAR:   negative for chest pain, palpitations, syncope, edema     GASTROINTESTINAL:   negative for nausea, vomiting     GENITOURINARY:   negative for incontinence     MUSCULOSKELETAL:   negative for neck or back pain     NEUROLOGICAL:   Negative for weakness and tingling  negative for headaches and dizziness     PSYCHIATRIC:   negative for anxiety         OBJECTIVE:   VITALS:  height is 4' 9\" (1.448 m) and weight is 254 lb (115.2 kg). Her infrared temperature is 98.3 °F (36.8 °C). Her blood pressure is 146/96 (abnormal) and her pulse is 90. Her respiration is 18 and oxygen saturation is 96%. CONSTITUTIONAL:alert & cooperative, no acute distress. Very pleasant and talkative. SKIN:  Warm and dry, no rashes on exposed areas of skin. HEAD:  Normocephalic, atraumatic. EYES: EOMs intact. Wearing glasses. EARS:  Hearing grossly WNL. NOSE:  Nares patent. No rhinorrhea. MOUTH/THROAT:  benign  NECK:good ROM. LUNGS: Clear to auscultation bilaterally, no wheezes. CARDIOVASCULAR: Heart sounds are normal.  Regular rate and rhythm without murmur. ABDOMEN: soft, non tender, non distended. Rotund/obese. EXTREMITIES: no edema bilateral lower extremities. Testing:   EK22  Labs pending: drawn 2022   Chest XRay:  22    IMPRESSIONS:   obesity   has a past medical history of ADHD (attention deficit hyperactivity disorder) (), Headache (22), HTN (hypertension), Obesity, PCOS (polycystic ovarian syndrome), and Wellness examination.    PLANS:   Sleeve gastrectomy    ENZO Guerra PA-C  Electronically signed 2022 at 11:55 AM

## 2022-11-21 NOTE — TELEPHONE ENCOUNTER
[place] paperwork was received. Blank copy of forms has been scanned. Patient notified it will take up to 3-5 business days for completion.   Upon completion will fax to company and then give originals to patient

## 2022-11-21 NOTE — PROGRESS NOTES
Anesthesia Focused Assessment    Has patient ever tested positive for COVID? Yes, 2021. STOP-BANG Sleep Apnea Questionnaire    SNORE loudly (heard through closed doors)? No  TIRED, fatigued, sleepy during daytime? No  OBSERVED stopping breathing during sleep? No  High blood PRESSURE being treated? Yes    BMI over 35? Yes  AGE over 48? No  NECK circumference over 16\"? No  GENDER (male)? No             Total 2  High risk 5-8  Intermediate risk 3-4  Low risk 0-2    Obstructive Sleep Apnea: denies  If YES, machine used: no     Type 1 DM:   no  T2DM:  no    Coronary Artery Disease:  no  Hypertension:  yes    Active smoker:  no  Drinks Alcohol:  rarely    Dentition: benign    Defib / AICD / Pacemaker: no      Renal Failure/dialysis:  no    Patient was evaluated in PAT & anesthesia guidelines were applied. NPO guidelines, medication instructions and scheduled arrival time were reviewed with patient. I advised patient to please contact the surgeon's office, ahead of time if possible, if any new signs or symptoms of illness, infection, rash, etc    Hx of anesthesia complications:  no  Family hx of anesthesia complications:  no                                                                                                                     Anesthesia contacted:   no  Medical or cardiac clearance ordered: PCP clearance is pending, will request copy for chart.     Gia Genao PA-C  11/21/22  11:56 AM

## 2022-11-21 NOTE — H&P
History and Physical    Pt Name: Sandoval Calle  MRN: [id number]  YOB: 1995  Date of evaluation: 11/21/2022    SUBJECTIVE:   History of Chief Complaint:    Patient presents for PAT appointment. She complains of obesity, difficulty with ability to lose weight and maintain weight loss. 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 She has tried several programs in the past without lasting success. She has several comorbid conditions that would be helped with weight loss. She has been scheduled for sleeve gastrectomy. Past Medical History    has a past medical history of ADHD (attention deficit hyperactivity disorder), Headache, HTN (hypertension), Obesity, PCOS (polycystic ovarian syndrome), and Wellness examination. Past Surgical History   has a past surgical history that includes Upper gastrointestinal endoscopy (N/A, 03/04/2022). Medications  Prior to Admission medications    Medication Sig Start Date End Date Taking? Authorizing Provider   etonogestrel-ethinyl estradiol (NUVARING) 0.12-0.015 MG/24HR vaginal ring Place 1 each vaginally See Admin Instructions   Yes Historical Provider, MD   acetaminophen (TYLENOL) 500 MG tablet Take 1,000 mg by mouth every 6 hours as needed for Pain   Yes Historical Provider, MD   Multiple Vitamin (MULTIVITAMIN ADULT PO) Take by mouth    Historical Provider, MD     Allergies  has No Known Allergies. Family History  family history includes Cancer in her maternal aunt, maternal grandmother, mother, and paternal grandmother; Diabetes in her maternal grandmother; High Blood Pressure in her father and mother; Migraines in her mother; Obesity in her father and mother. Social History   reports that she has never smoked. She has been exposed to tobacco smoke. She has never used smokeless tobacco.   reports that she does not currently use alcohol. reports no history of drug use.   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 Review of Systems:  CONSTITUTIONAL:   negative for fevers, chills, fatigue and malaise    EYES:   negative for double vision, blurred vision and photophobia    HEENT:   negative for tinnitus, epistaxis and sore throat     RESPIRATORY:   negative for cough, shortness of breath, wheezing     CARDIOVASCULAR:   negative for chest pain, palpitations, syncope, edema     GASTROINTESTINAL:   negative for nausea, vomiting     GENITOURINARY:   negative for incontinence     MUSCULOSKELETAL:   negative for neck or back pain     NEUROLOGICAL:   Negative for weakness and tingling  negative for headaches and dizziness     PSYCHIATRIC:   negative for anxiety         OBJECTIVE:   VITALS:  height is 4' 9\" (1.448 m) and weight is 254 lb (115.2 kg). Her infrared temperature is 98.3 °F (36.8 °C). Her blood pressure is 146/96 (abnormal) and her pulse is 90. Her respiration is 18 and oxygen saturation is 96%. CONSTITUTIONAL:alert & cooperative, no acute distress. Very pleasant and talkative. SKIN:  Warm and dry, no rashes on exposed areas of skin. HEAD:  Normocephalic, atraumatic. EYES: EOMs intact. Wearing glasses. EARS:  Hearing grossly WNL. NOSE:  Nares patent. No rhinorrhea. MOUTH/THROAT:  benign  NECK:good ROM. LUNGS: Clear to auscultation bilaterally, no wheezes. CARDIOVASCULAR: Heart sounds are normal.  Regular rate and rhythm without murmur. ABDOMEN: soft, non tender, non distended. Rotund/obese. EXTREMITIES: no edema bilateral lower extremities. Testing:   EK22  Labs pending: drawn 2022   Chest XRay:  22    IMPRESSIONS:   obesity   has a past medical history of ADHD (attention deficit hyperactivity disorder) (), Headache (22), HTN (hypertension), Obesity, PCOS (polycystic ovarian syndrome), and Wellness examination.    PLANS:   Sleeve gastrectomy    ENZO Guerra PA-C  Electronically signed 2022 at 11:55 AM

## 2022-11-22 ENCOUNTER — OFFICE VISIT (OUTPATIENT)
Dept: FAMILY MEDICINE CLINIC | Age: 27
End: 2022-11-22
Payer: COMMERCIAL

## 2022-11-22 VITALS
OXYGEN SATURATION: 97 % | HEIGHT: 57 IN | WEIGHT: 260.4 LBS | SYSTOLIC BLOOD PRESSURE: 112 MMHG | DIASTOLIC BLOOD PRESSURE: 82 MMHG | BODY MASS INDEX: 56.18 KG/M2 | HEART RATE: 96 BPM

## 2022-11-22 DIAGNOSIS — Z01.818 PRE-OP EVALUATION: Primary | ICD-10-CM

## 2022-11-22 DIAGNOSIS — F40.243 FEAR OF FLYING: ICD-10-CM

## 2022-11-22 DIAGNOSIS — E66.01 MORBID OBESITY WITH BMI OF 50.0-59.9, ADULT (HCC): ICD-10-CM

## 2022-11-22 LAB
EKG ATRIAL RATE: 85 BPM
EKG P AXIS: 56 DEGREES
EKG P-R INTERVAL: 138 MS
EKG Q-T INTERVAL: 356 MS
EKG QRS DURATION: 84 MS
EKG QTC CALCULATION (BAZETT): 423 MS
EKG R AXIS: 39 DEGREES
EKG T AXIS: 16 DEGREES
EKG VENTRICULAR RATE: 85 BPM

## 2022-11-22 PROCEDURE — 99214 OFFICE O/P EST MOD 30 MIN: CPT | Performed by: NURSE PRACTITIONER

## 2022-11-22 PROCEDURE — 3078F DIAST BP <80 MM HG: CPT | Performed by: NURSE PRACTITIONER

## 2022-11-22 PROCEDURE — 3074F SYST BP LT 130 MM HG: CPT | Performed by: NURSE PRACTITIONER

## 2022-11-22 RX ORDER — ALPRAZOLAM 0.25 MG/1
0.25 TABLET ORAL
Qty: 4 TABLET | Refills: 0 | Status: SHIPPED | OUTPATIENT
Start: 2022-11-22 | End: 2022-11-22

## 2022-11-22 ASSESSMENT — ENCOUNTER SYMPTOMS
DIARRHEA: 0
SHORTNESS OF BREATH: 0
CHEST TIGHTNESS: 0
CONSTIPATION: 0
ABDOMINAL PAIN: 0
COLOR CHANGE: 0

## 2022-11-22 NOTE — PROGRESS NOTES
PREOPERATIVE MEDICAL CONSULTATION:       CHIEF COMPLAINT:  Preoperative Exam      HISTORY OF PRESENT ILLNESS:     David Clark is a 33 y.o. female who presents for preoperative medical clearance. Procedure: gastric sleeve   Anesthesia Type: general   Date: 12/5/22  Surgeon: Dr Aldo Bueno      If patient has hx of moderate or higher valve stenosis or regurgitation, ICD, pulm HTN, congenital heart disease, or severe systemic disease; see Cardiology prior to surgery: n/a  If patient already established with Cardiology, consultation is warranted in most cases: n/a        RCRI (2 or more factors make patient high risk)          High risk surgery (intraperitoneal, intrathoracic, supra-inguinal vascular surg)?: no  Hx of ischemic heart disease?: no  Hx of CHF?: no  Hx of stroke?: no  Currently on insulin?: no  Current Cr >2.0?: no     Score: 0/6; low risk        STOP BANG Score  Do you snore loudly?:        no  Do you often feel tired, fatigued, or sleepy during the daytime?:  no   Has anyone observed you stop breathing during sleep?:   no  Do you have high blood pressure/or being treated for HTN?:  no  BMI >35:          yes  Age >50 years:         no  Neck circumference >40 cm:       no  Male Gender:         no    Score: 1/8; low risk  Patient will be referred for a overnight sleep study at the earliest possible time, but if surgery occurs prior to this diagnosis, anesthesia should be notified since patient may need extra support in the perioperative period          ACTIVE PROBLEMS:     David Clark     Pre-op evaluation  Morbid obesity BMI 56.35  GERD  Fear of Flying      PAST HISTORIES:       Past Medical History:   Diagnosis Date    ADHD (attention deficit hyperactivity disorder) 2000    I was on medication when i was younger but as i got older it my adhd wasnt as bad but I still sometimes have moments on not being able to focus l.     Headache 7/1/22    From muscle tightness and stiffness in my neck and shoulders    HTN (hypertension)     does not take meds    Obesity     PCOS (polycystic ovarian syndrome)     Dr. Naren Clancy examination     [facility] [address] [facility] [facility].  appt 11/22/2022 for medical clearance     Past Surgical History:   Procedure Laterality Date    UPPER GASTROINTESTINAL ENDOSCOPY N/A 03/04/2022    EGD BIOPSY performed by Vasquez Weber DO at [address] [facility].     Family History   Problem Relation Age of Onset    Cancer Mother         uterine or ovarian CA    Migraines Mother     High Blood Pressure Mother     Obesity Mother     High Blood Pressure Father     Obesity Father     Cancer Maternal Grandmother     Diabetes Maternal Grandmother     Cancer Paternal Grandmother     Cancer Maternal Aunt      Social History     Occupational History    Not on file   Tobacco Use    Smoking status: Never     Passive exposure: Current ( - outside)    Smokeless tobacco: Never   Vaping Use    Vaping Use: Never used   Substance and Sexual Activity    Alcohol use: Not Currently     Comment: rare    Drug use: Never    Sexual activity: Yes     Partners: Male     Family History   Problem Relation Age of Onset    Cancer Mother         uterine or ovarian CA    Migraines Mother     High Blood Pressure Mother     Obesity Mother     High Blood Pressure Father     Obesity Father     Cancer Maternal Grandmother     Diabetes Maternal Grandmother     Cancer Paternal Grandmother     Cancer Maternal Aunt      Social History     Substance and Sexual Activity   Alcohol Use Not Currently    Comment: rare     Social History     Tobacco Use   Smoking Status Never    Passive exposure: Current ( - outside)   Smokeless Tobacco Never     Social History     Substance and Sexual Activity   Drug Use Never     Social History     Substance and Sexual Activity   Sexual Activity Yes    Partners: Male             ANESTHESIA HISTORY:   No family history of anesthesia problems  []  YES    [x]  NO     []  UNKNOWN   History of problematic/difficult intubations. []  YES    [x]  NO     []  UNKNOWN   History of prior anesthesia reactions. []  YES    [x]  NO     []  UNKNOWN   Family history of anesthesia reactions. []  YES    [x]  NO     []  UNKNOWN   History of bleeding or clotting disorders. []  YES    [x]  NO     []  UNKNOWN   Family history of bleeding/clotting disorders. []  YES    [x]  NO     []  UNKNOWN   Past history of blood transfusions. REVIEW OF SYSTEMS:     Review of Systems   Constitutional:  Negative for activity change, appetite change and unexpected weight change. HENT:  Negative for congestion. Respiratory:  Negative for chest tightness and shortness of breath. Cardiovascular:  Negative for chest pain, palpitations and leg swelling. Gastrointestinal:  Negative for abdominal pain, constipation and diarrhea. Genitourinary:  Negative for difficulty urinating and dysuria. Musculoskeletal:  Negative for arthralgias and myalgias. Skin:  Negative for color change and rash. Neurological:  Negative for dizziness, numbness and headaches. Psychiatric/Behavioral:  Negative for dysphoric mood and sleep disturbance. The patient is not nervous/anxious. OBJECTIVE:     Vitals:    11/22/22 0809   BP: 112/82   Pulse: 96   SpO2: 97%         Physical Exam  Vitals reviewed. Constitutional:       General: She is not in acute distress. Appearance: She is obese. HENT:      Head: Normocephalic and atraumatic. Right Ear: External ear normal.      Left Ear: External ear normal.      Nose: Nose normal.      Mouth/Throat:      Mouth: Mucous membranes are moist.   Eyes:      Extraocular Movements: Extraocular movements intact. Conjunctiva/sclera: Conjunctivae normal.      Comments: Wears glasses   Cardiovascular:      Rate and Rhythm: Normal rate and regular rhythm. Pulses: Normal pulses. Heart sounds: Normal heart sounds.  No murmur heard. Pulmonary:      Effort: Pulmonary effort is normal. No respiratory distress. Breath sounds: Normal breath sounds. Abdominal:      General: Bowel sounds are normal.      Palpations: Abdomen is soft. Musculoskeletal:         General: Normal range of motion. Cervical back: Normal range of motion. Skin:     General: Skin is warm and dry. Neurological:      Mental Status: She is alert and oriented to person, place, and time. Gait: Gait normal.   Psychiatric:         Mood and Affect: Mood normal.         Behavior: Behavior normal.         Thought Content:  Thought content normal.         Judgment: Judgment normal.         DIAGNOSTIC AND LABORATORY TESTING:     EKG:  Rate: 85 bpm  P-R Interval : 138 ms  QRS Duration : 84 ms  Q-T Interval : 356 ms  QTc : 423 ms  P Axis : 56 degrees  R Axis : 39 degrees  T Axis : 16 degrees  Diagnosis: Normal Sinus Rhythm, normal EKG        Lab Results Reviewed:    Hospital Outpatient Visit on 11/21/2022   Component Date Value Ref Range Status    PTT 11/21/2022 24.5  20.5 - 30.5 sec Final    Protime 11/21/2022 10.4  9.1 - 12.3 sec Final    INR 11/21/2022 1.0   Final    Ventricular Rate 11/21/2022 85  BPM Preliminary    Atrial Rate 11/21/2022 85  BPM Preliminary    P-R Interval 11/21/2022 138  ms Preliminary    QRS Duration 11/21/2022 84  ms Preliminary    Q-T Interval 11/21/2022 356  ms Preliminary    QTc Calculation (Bazett) 11/21/2022 423  ms Preliminary    P [place] 11/21/2022 56  degrees Preliminary    R Axis 11/21/2022 39  degrees Preliminary    T Axis 11/21/2022 16  degrees Preliminary   Hospital Outpatient Visit on 11/04/2022   Component Date Value Ref Range Status    Vit D, 25-Hydroxy 11/04/2022 20.9 (A)  >29.9 ng/mL Final    TSH 11/04/2022 1.98  0.30 - 5.00 uIU/mL Final    Hemoglobin A1C 11/04/2022 5.1  4.0 - 6.0 % Final    Estimated Avg Glucose 11/04/2022 100  mg/dL Final    Glucose, Fasting 11/04/2022 97  70 - 99 mg/dL Final    BUN 11/04/2022 18 6 - 20 mg/dL Final    Creatinine 11/04/2022 0.46 (A)  0.50 - 0.90 mg/dL Final    Est, Glom Filt Rate 11/04/2022 >60  >60 mL/min/1.73m2 Final    Calcium 11/04/2022 8.8  8.6 - 10.4 mg/dL Final    Sodium 11/04/2022 136  135 - 144 mmol/L Final    Potassium 11/04/2022 4.7  3.7 - 5.3 mmol/L Final    Chloride 11/04/2022 102  98 - 107 mmol/L Final    CO2 11/04/2022 21  20 - 31 mmol/L Final    Anion Gap 11/04/2022 13  9 - 17 mmol/L Final    Alkaline Phosphatase 11/04/2022 94  35 - 104 U/L Final    ALT 11/04/2022 23  5 - 33 U/L Final    AST 11/04/2022 28  <32 U/L Final    Total Bilirubin 11/04/2022 0.9  0.3 - 1.2 mg/dL Final    Total Protein 11/04/2022 7.7  6.4 - 8.3 g/dL Final    Albumin 11/04/2022 3.8  3.5 - 5.2 g/dL Final    Albumin/Globulin Ratio 11/04/2022 1.0  1.0 - 2.5 Final    WBC 11/04/2022 9.0  3.5 - 11.3 k/uL Final    RBC 11/04/2022 4.78  3.95 - 5.11 m/uL Final    Hemoglobin 11/04/2022 13.2  11.9 - 15.1 g/dL Final    Hematocrit 11/04/2022 40.3  36.3 - 47.1 % Final    MCV 11/04/2022 84.3  82.6 - 102.9 fL Final    MCH 11/04/2022 27.6  25.2 - 33.5 pg Final    MCHC 11/04/2022 32.8  28.4 - 34.8 g/dL Final    RDW 11/04/2022 12.5  11.8 - 14.4 % Final    Platelets 57/12/7693 331  138 - 453 k/uL Final    MPV 11/04/2022 9.9  8.1 - 13.5 fL Final    NRBC Automated 11/04/2022 0.0  0.0 per 100 WBC Final    Seg Neutrophils 11/04/2022 71 (A)  36 - 65 % Final    Lymphocytes 11/04/2022 21 (A)  24 - 43 % Final    Monocytes 11/04/2022 6  3 - 12 % Final    Eosinophils % 11/04/2022 2  1 - 4 % Final    Basophils 11/04/2022 0  0 - 2 % Final    Immature Granulocytes 11/04/2022 0  0 % Final    Segs Absolute 11/04/2022 6.39  1.50 - 8.10 k/uL Final    Absolute Lymph # 11/04/2022 1.86  1.10 - 3.70 k/uL Final    Absolute Mono # 11/04/2022 0.51  0.10 - 1.20 k/uL Final    Absolute Eos # 11/04/2022 0.14  0.00 - 0.44 k/uL Final    Basophils Absolute 11/04/2022 0.03  0.00 - 0.20 k/uL Final    Absolute Immature Granulocyte 11/04/2022 0.03  0.00 - 0.30 k/uL Final            ASSESSMENT AND PLAN:     1. Pre-op evaluation  2. Morbid obesity with BMI of 50.0-59.9, adult ([address]  3. Fear of flying  -     ALPRAZolam (XANAX) 0.25 MG tablet; Take 1 tablet by mouth once as needed for Anxiety for up to 1 dose., Disp-4 tablet, R-0Normal     Patient cleared for gastric sleeve. Per ACS risk calculator, below average risk for surgery. Leaving for [place] in January. Requesting anti anxiety for trip. Understanding and agreement was voiced with all above plans. All questions answered to satisfaction. Encouraged healthy diet and exercise. Call office with any questions or new or worsening symptoms or concerns. Return in about 3 months (around 2/22/2023), or if symptoms worsen or fail to improve, for chronic conditions. Electronically signed by TRAMAINE Coleman CNP on 11/22/2022 at 8:20 AM    Note is dictated utilizing voice recognition software. Unfortunately this leads to occasional typographical errors. Please contact our office if you have any questions.

## 2022-11-23 ENCOUNTER — OFFICE VISIT (OUTPATIENT)
Dept: BARIATRICS/WEIGHT MGMT | Age: 27
End: 2022-11-23
Payer: COMMERCIAL

## 2022-11-23 VITALS
DIASTOLIC BLOOD PRESSURE: 88 MMHG | HEART RATE: 74 BPM | RESPIRATION RATE: 20 BRPM | HEIGHT: 57 IN | SYSTOLIC BLOOD PRESSURE: 140 MMHG | BODY MASS INDEX: 56.35 KG/M2

## 2022-11-23 DIAGNOSIS — K21.9 GASTROESOPHAGEAL REFLUX DISEASE WITHOUT ESOPHAGITIS: Primary | ICD-10-CM

## 2022-11-23 DIAGNOSIS — E66.01 MORBID OBESITY WITH BMI OF 50.0-59.9, ADULT (HCC): ICD-10-CM

## 2022-11-23 PROCEDURE — 99214 OFFICE O/P EST MOD 30 MIN: CPT | Performed by: SURGERY

## 2022-11-23 PROCEDURE — 3078F DIAST BP <80 MM HG: CPT | Performed by: SURGERY

## 2022-11-23 PROCEDURE — 3074F SYST BP LT 130 MM HG: CPT | Performed by: SURGERY

## 2022-11-25 LAB
COTININE: <5 NG/ML
NICOTINE: <5 NG/ML

## 2022-11-29 ENCOUNTER — HOSPITAL ENCOUNTER (OUTPATIENT)
Dept: PHYSICAL THERAPY | Facility: CLINIC | Age: 27
Setting detail: THERAPIES SERIES
Discharge: HOME | End: 2022-11-29
Payer: COMMERCIAL

## 2022-11-29 ENCOUNTER — TELEPHONE (OUTPATIENT)
Dept: BARIATRICS/WEIGHT MGMT | Age: 27
End: 2022-11-29

## 2022-11-29 PROCEDURE — 97140 MANUAL THERAPY 1/> REGIONS: CPT

## 2022-11-29 PROCEDURE — 97110 THERAPEUTIC EXERCISES: CPT

## 2022-11-29 NOTE — FLOWSHEET NOTE
[] [facility] [address]  [address]  [phone]  F: [phone] [x] [address]  [facility]   [address]  P: [phone]  F: [phone] [] [address]  [address]  P: [phone]  F: [phone] [] [address]  P: [phone]  F: [phone] [] [address]  [facility]   [address]   [place]: [phone]  F: [phone]      Physical Therapy Daily Treatment Note    Date:  2022  Patient Name:  Jeferson Duncan    :  1995  MRN: [id number]  Physician: Cortez Contreras DC                   Insurance: [employer] [address] ( Auth After 30vs)  Medical Diagnosis:   M54.12 (ICD-10-CM) - Radiculopathy, cervical region  M54.2 (ICD-10-CM) - Cervicalgia  M99.01 (ICD-10-CM) - Segmental and somatic dysfunction of cervical region  Rehab Codes: M25.60 , M25.611, M25.612, M25.511, M54.2, R29.3, M62.81   Onset Date: 22             Next 's appt.: TBD  Visit# / total visits:     Cancels/No Shows: 4/0      Subjective:    Pain:  [x] Yes  [] No  Location: R-sided neck to R shoulder   Pain Rating: (0-10 scale) 6-7/10  Pain altered Tx:  [x] No  [] Yes  Action:  Comments: Pt arrives noting continued 6-7/10 pain. Pt notes continued HA present. Reports she completes HEP however pain then comes back. Pt notes she has tried a new pillow and changing sleep positions with out relief.      Objective:  Modalities: 8 mins MHP pre-exs to B upper trap in SUPINE -   Precautions:   Bold = completed 22   Exercise Reps/ Time Weight/ Level Comments   UBE warm up  2'/2'  lv 1     Seated          B upper trap/levator scap stretch 4x30s ea        Chin tuck  10x5s   (+) pain/numbness L upper trap to L upper arm - modify next visit as needed; not today    Scap retraction 20x5s        Spinal ext + pec stretch 10x5s    New 10/27   Horizontal abd 2x10 Lime  New 10/27   B ER  2x10 Lime  New 10/27-c/o inc pain in L ant shoulder    Shoulder roll - retro 20x ea   New 11/22               Sidelying      B abduction/flex  20x   To 90 deg; not today          Supine       C flex/ext/rot 10x ea  2.2#ball          Prone       Y/T/A 2x10 ea   New 11/2         Standing      W lift off 20x   Face against wall   Shoulder row/ext 20x  Blue   Progressed 11/2   Carry weight w/ walking 30ftx2 ea 8# ea arm New 11/2-on the side, on shoulder height, overhead    Doorway pec stretch  4x20\"  Added 11/10    Flexion wall slides  10x5\"    Added 11/10          Postural education  x       Other:     Manual Therapy: IASTM with hawk- for 5 mins then MFR manually to B upper trap L>R in sitting - 10 mins total   10/27: Hypervolt to R upper trap for 10 mins pre-exs in seated   11/10: MFR (manual) to  upper trap, C-paraspinals, SOR - 25 mins  11/29: 24 min total   Supine : SOR, MFR to SCM, Cervical paraspinals, UTs  Sitting: MFR UTs         Specific Instructions for next treatment:  - Improve BUE and scapulothoracic stabilizers strength and mobility  - Manual (trigger point release, MFR, STM) to B upper trap L>R and SOR to reduce muscular tension   - Review postural education  - HP/CP as needed  - Trials of cervical distraction (mechanical or manual) if radicular symptoms worsen or when necessary   - Provide & discuss fall prevention intervention next visit - completed 11/2      Treatment Charges: Mins Units   [x]  Modalities-[facility] 8 --   [x]  Ther Exercise 20 1   [x]  Manual Therapy 24 2   []  Ther Activities     []  Aquatics     []  Vasocompression     []  Other     Total Treatment time 44 3         Assessment: [x] Progressing toward goals. Initiated session with warm up followed by HP prior to exercises. Began with manual SOR and MFR to cparaspinals, UT and SCM. Frequent readjusting neck position as well as LE where pt notes she is uncomfortable this date. Discontinued supine interventions and finished manual in sitting. Pt with increased tightness present bilaterally in UT. Following manual completing stretching to improve carryover of manual. Completed scap retraction with cueing to ensure UT compensation is minimized and to ensure depression is completed. Discussed postural awareness and stretching through out work shift to assist with pain. [] No change. [] Other:    [x] Patient would continue to benefit from skilled physical therapy services in order to: manage pain and muscle tightness, improve cervical ROM, improve BUE and scapulothoracic stabilizers strength, and promote upright posture to assist pt in returning to prior function. Problems:    [x] ? Pain: 3-9/10 neck w/ intermittent radicular symptoms to L palm of hand & headache   [x] ? ROM: cervical extension, B rot, B SB  [x] ? Strength: BUE globally, scapulothoracic stabilizers, deep neck flexors   [x] ?  Function: NDI 32% impaired   [x] Other: impaired posture       STG: (to be met in 7 treatments)  Pt will reduce pain to less than or equal to 5/10 with ADLs  Pt will demo improved cervical ext/B rot/B SB AROM to < 10% limited with reduced c/o muscle tightness/pain to ease difficulty with driving  Pt will improve B shoulder flexors/abductors strength to at least 4/5 without B upper trap compensation to reduce difficulty with overhead activities   Pt will be able to perform 20x scapular retraction with min to no c/o increased pain/tightness in L upper trap to demo improved scapular rhythm and controlled mobility    Pt will be able to perform 20x chin tuck in sitting/supine with min no no c/o inc radicular symptoms to LUE to demo improved functional strength and endurance of deep neck flexors   Patient to be independent with home exercise program as demonstrated by performance with correct form without cues. Demonstrate Knowledge of fall prevention     LTG: (to be met in 12 treatments)  Pt will improve NDI score from 32% impaired to less than 22% impaired to demo improved functional mobility to participate in daily functional activities  Pt will improve BUE strength to 5/5 globally to participate in school-related activities  Pt will be able to maintain upright posture without cues for at least 20 mins to demo improved postural awareness         Patient goals: \"be able to move my neck and shoulder without pain\"         Pt. Education:  [x] Yes  [] No  [x] Reviewed Prior HEP/Ed  Method of Education: [x] Verbal - postural awareness, stretching through out shift at work  [] Demo  [] Written  Comprehension of Education:  [x] [employer] understanding. [] Demonstrates understanding. [] Needs review. [x] Demonstrates/verbalizes HEP/Ed previously given. Access Code: [email or web address]  URL: [email or web address]  Date: 10/11/2022  Prepared by: Lety Roman     Exercises  Seated Levator Scapulae Stretch - 1 x daily - 7 x weekly - 1 sets - 3 reps - 30s hold  Seated Upper Trapezius Stretch - 1 x daily - 7 x weekly - 1 sets - 3 reps - 30s hold  Seated Cervical Retraction - 1 x daily - 7 x weekly - 1 sets - 10 reps - 3s hold  Supine Cervical Retraction with Towel - 1 x daily - 7 x weekly - 1 sets - 10 reps - 3s hold  Correct Seated Posture - 1 x daily - 7 x weekly - 2 sets - 10 reps - 5s hold  Sidelying Open Book Thoracic Lumbar Rotation and Extension - 1 x daily - 7 x weekly - 2 sets - 10 reps       Plan: [x] Continue current frequency toward long and short term goals. [x] Specific Instructions for subsequent treatments: one more appt then to be on hold till after procedure.        Time In: 11:02 Am             Time Out: 11:58 am     Electronically signed by:  Shanon Izaguirre PTA

## 2022-11-29 NOTE — TELEPHONE ENCOUNTER
Changed surgery time from 9:20 am to 7:10 am to arrive at surgery center by 5:45 am.  Patient verbalized understanding.

## 2022-11-29 NOTE — PROGRESS NOTES
[address] [facility] MIN INVASIVE BARIATRIC SURG  [address]  [address]  [place] [place] [address]  Dept: [phone]    SURGICAL WEIGHT MANAGEMENT PROGRAM   PROGRESS NOTE - SURGICAL EVALUATION    CC: Weight Loss       Patient: Luis E Hernadez        Service Date: 11/23/2022       Medical Record #: [id number]    Patient History/Assessment Summary:    The patient is a pleasant 33y.o. year old female  with morbid obesity, who stands Height: 4' 9\" (144.8 cm) tall with a weight of   , resulting in a BMI of Body mass index is 56.35 kg/m². .     This patient is with mom for the evaluation today. The patient is being evaluated to undergo weight loss surgery to treat the following comorbid conditions caused by her morbid obesity: Hypertension, Dyspnea on Exertion, GERD, and Polycystic Ovarian Syndrome    She attended the weight loss surgery seminar, and attended bariatric education    Last Visit Weight:   Wt Readings from Last 3 Encounters:   11/21/22 254 lb (115.2 kg)   11/22/22 260 lb 6.4 oz (118.1 kg)   11/03/22 261 lb (118.4 kg)     Today's weight is decreased from the last visit. Highest Weight:     The patient is being evaluated for Laparoscopic Sleeve Gastrectomy. She  is here today to review the details of surgery. The patient acknowledges and understands the risks, benefits, and options we have discussed, as outlined in the Additional Informed Consent for this procedure. Patient also understands the importance of surgical and post-operative recommendations, including the operative diet and regular post-operative follow up care. The importance of ambulation and incentive spirometry was also discussed. All questions of this patient and any family members present have been answered to their satisfaction.     Physical Examination:     BP (!) 140/88 (Site: Right Lower Arm, Position: Sitting, Cuff Size: Large Adult)   Pulse 74   Resp 20   Ht 4' 9\" (1.448 m)   BMI 56.35 kg/m²   General This patient is awake, alert, and oriented, and is in no apparent distress. Cardiac Regular rate and rhythm without evidence of murmur. Respiratory Clear to auscultation bilaterally. Abdomen Obese, soft, non-tender, non-distended without masses/ No  evidence of abdominal hernia / Incisions consistent with previous surgeries. Head and Neck Obese, normocephalic and atraumatic/soft and supple, no  lymphadenopathy or obvious bruits. Extremeties No cyanosis, clubbing or edema/ No calf tenderness/No  restrictions of movement, is ambulatory without assistance. Neurological Intact x 4 extremities, no focal deficits noted   Skin No rashes or lesions noted   Rectal Deferred     RECOMMENDATIONS:       Diagnosis Orders   1. Gastroesophageal reflux disease without esophagitis        2. Morbid obesity with BMI of 50.0-59.9, adult ([facility] [address]               We spent a great deal of time discussing the risks and benefits of Laparoscopic Sleeve Gastrectomy, including but not limited to injury to intra-abdominal organs, breakdown of the gastric staple line, the need for re-operative therapy,  prolonged hospitalization,  mechanical ventilation,  and death. We discussed the possibility of bleeding, the need for blood transfusions, blood clots, hospital-acquired and intra-abdominal infection, anastomotic stricture, and worsening GERD. And we discussed the need for post-operative visit compliance, behavior modifications and diet changes, protein and vitamin supplementation, as well as routine scheduled and dedicated exercise. We discussed the potential weight loss benefit of approximately 50-60% of her excess body weight at 12-18 months post-op, as well as the possibility of insufficient weight loss or weight gain after 2 years post-operative time.      The following was discussed with the patient:    DVT Prophylaxis    GERD  Risks of GERD with sleeve  Potential need for PPI Discussed  EGD reviewed with patient, surgical options discussed    Morbid Obesity, BMI 56  Need for long term diet and exercise to promote sustained weight loss  Surgical and medical options for weight loss  Discussed  Decision for surgery for weight loss    Electronically signed by Moe Alcala DO on 11/29/2022 at 5:31 PM    Please note that this chart was generated using voice recognition Dragon dictation software. Although every effort was made to ensure the accuracy of this automated transcription, some errors in transcription may have occurred.

## 2022-12-01 ENCOUNTER — HOSPITAL ENCOUNTER (OUTPATIENT)
Dept: PHYSICAL THERAPY | Facility: CLINIC | Age: 27
Setting detail: THERAPIES SERIES
Discharge: HOME | End: 2022-12-01
Payer: COMMERCIAL

## 2022-12-01 PROCEDURE — 97110 THERAPEUTIC EXERCISES: CPT

## 2022-12-01 PROCEDURE — 97012 MECHANICAL TRACTION THERAPY: CPT

## 2022-12-01 PROCEDURE — 97140 MANUAL THERAPY 1/> REGIONS: CPT

## 2022-12-01 NOTE — FLOWSHEET NOTE
[] [facility] [address] [facility]  [address]  [phone]  F: [phone] [x] [address]  [facility]   [address]  P: [phone]  F: [phone] [] [address]  [address]  P: [phone]  F: [phone] [] [address]  P: [phone]  F: [phone] [] [address]  [facility]   [address]   [place]: [phone]  F: [phone]      Physical Therapy Daily Treatment Note    Date:  2022  Patient Name:  Zuleyka Wong    :  1995  MRN: [id number]  Physician: Shania Kimble DC                   Insurance: [employer] ( Auth After 30vs)  Medical Diagnosis:   M54.12 (ICD-10-CM) - Radiculopathy, cervical region  M54.2 (ICD-10-CM) - Cervicalgia  M99.01 (ICD-10-CM) - Segmental and somatic dysfunction of cervical region  Rehab Codes: M25.60 , M25.611, M25.612, M25.511, M54.2, R29.3, M62.81   Onset Date: 22             Next 's appt.: TBD  Visit# / total visits:     Cancels/No Shows: 4/0      Subjective:    Pain:  [x] Yes  [] No  Location: R-sided neck to R shoulder   Pain Rating: (0-10 scale) 6-7/10  Pain altered Tx:  [x] No  [] Yes  Action:  Comments: Pt arrives reporting moderate levels of pain. Pt states she has been experiencing sharp pains during work and continuously has headaches.        Objective:  Modalities: 8 mins MHP pre-exs to B upper trap in SUPINE -held   Precautions:   Wade Butler = completed 22   Exercise Reps/ Time Weight/ Level Comments   UBE warm up  2'/2'  lv 1     Seated          B upper trap/levator scap stretch 4x30s ea        Chin tuck  10x5s   (+) pain/numbness L upper trap to L upper arm - modify next visit as needed; not today    Scap retraction 20x5s Spinal ext + pec stretch 10x5s    New 10/27   Horizontal abd 2x10 Lime  New 10/27   B ER  2x10 Lime  New 10/27-c/o inc pain in L ant shoulder    Shoulder roll - retro 20x ea   New 11/22               Sidelying      B abduction/flex  20x   To 90 deg; not today          Supine       C flex/ext/rot 10x ea  2.2#ball          Prone       Y/T/A 2x10 ea   New 11/2         Standing      W lift off 20x   Face against wall   Shoulder row/ext 20x  Blue   Progressed 11/2   Carry weight w/ walking 30ftx2 ea 8# ea arm New 11/2-on the side, on shoulder height, overhead    Doorway pec stretch  4x20\"  Added 11/10    Flexion wall slides  10x5\"    Added 11/10          Postural education  x       Other:    Cervical Traction: 15' total  60sec on/20sec off  max pressure 18/ min pressure 5      Manual Therapy: IASTM with hawk- for 5 mins then MFR manually to B upper trap L>R in sitting - 10 mins total   10/27: Hypervolt to R upper trap for 10 mins pre-exs in seated   11/10: MFR (manual) to  upper trap, C-paraspinals, SOR - 25 mins  11/29: 24 min total   Supine : SOR, MFR to SCM, Cervical paraspinals, UTs  Sitting: MFR UTs         Specific Instructions for next treatment:  - Improve BUE and scapulothoracic stabilizers strength and mobility  - Manual (trigger point release, MFR, STM) to B upper trap L>R and SOR to reduce muscular tension   - Review postural education  - HP/CP as needed  - Trials of cervical distraction (mechanical or manual) if radicular symptoms worsen or when necessary   - Provide & discuss fall prevention intervention next visit - completed 11/2      Treatment Charges: Mins Units   []  Modalities-MHP     [x]  Ther Exercise 10 1   [x]  Manual Therapy 32 2   []  Ther Activities     []  Aquatics     []  Vasocompression     [x]  Other- traction  15  1   Total Treatment time 57 4         Assessment: [x] Progressing toward goals.  Initiated treatment with seated cervical stretches followed by aggressive SOR and UT release with significant tightness in the R UT noted. Performed supine cervical mobs with ball with some relief reported. Trailed cervical traction to decompress the spine. Pt notes some muscular soreness however, does state \"my neck feels much better\". Pt to scheduled remaining visits after procedure. [] No change. [] Other:    [x] Patient would continue to benefit from skilled physical therapy services in order to: manage pain and muscle tightness, improve cervical ROM, improve BUE and scapulothoracic stabilizers strength, and promote upright posture to assist pt in returning to prior function. Problems:    [x] ? Pain: 3-9/10 neck w/ intermittent radicular symptoms to L palm of hand & headache   [x] ? ROM: cervical extension, B rot, B SB  [x] ? Strength: BUE globally, scapulothoracic stabilizers, deep neck flexors   [x] ? Function: NDI 32% impaired   [x] Other: impaired posture       STG: (to be met in 7 treatments)- checked 12/1   Pt will reduce pain to less than or equal to 5/10 with ADLs- NOT MET   Pt will demo improved cervical ext/B rot/B SB AROM to < 10% limited with reduced c/o muscle tightness/pain to ease difficulty with driving- Check next   Pt will improve B shoulder flexors/abductors strength to at least 4/5 without B upper trap compensation to reduce difficulty with overhead activities-MET 4/5  Pt will be able to perform 20x scapular retraction with min to no c/o increased pain/tightness in L upper trap to demo improved scapular rhythm and controlled mobility - NOT MET- painful   Pt will be able to perform 20x chin tuck in sitting/supine with min no no c/o inc radicular symptoms to LUE to demo improved functional strength and endurance of deep neck flexors- NOT MET-painful   Patient to be independent with home exercise program as demonstrated by performance with correct form without cues. - MET   Demonstrate Knowledge of fall prevention- not needed      LTG: (to be met in 12 treatments)  Pt will improve NDI score from 32% impaired to less than 22% impaired to demo improved functional mobility to participate in daily functional activities  Pt will improve BUE strength to 5/5 globally to participate in school-related activities  Pt will be able to maintain upright posture without cues for at least 20 mins to demo improved postural awareness         Patient goals: \"be able to move my neck and shoulder without pain\"         Pt. Education:  [x] Yes  [] No  [x] Reviewed Prior HEP/Ed  Method of Education: [x] Verbal - postural awareness, stretching through out shift at work  [] Demo  [] Written  Comprehension of Education:  [] Verbalizes understanding. [] Demonstrates understanding. [] Needs review. [x] Demonstrates/verbalizes HEP/Ed previously given. Access Code: [email or web address]  URL: [email or web address]  Date: 10/11/2022  Prepared by: Lety Roman     Exercises  Seated Levator Scapulae Stretch - 1 x daily - 7 x weekly - 1 sets - 3 reps - 30s hold  Seated Upper Trapezius Stretch - 1 x daily - 7 x weekly - 1 sets - 3 reps - 30s hold  Seated Cervical Retraction - 1 x daily - 7 x weekly - 1 sets - 10 reps - 3s hold  Supine Cervical Retraction with Towel - 1 x daily - 7 x weekly - 1 sets - 10 reps - 3s hold  Correct Seated Posture - 1 x daily - 7 x weekly - 2 sets - 10 reps - 5s hold  Sidelying Open Book Thoracic Lumbar Rotation and Extension - 1 x daily - 7 x weekly - 2 sets - 10 reps       Plan: [x] Continue current frequency toward long and short term goals. [x] Specific Instructions for subsequent treatments: to be on hold till after procedure.        Time In: 8:00 am             Time Out: 8:57 am     Electronically signed by:  Meir Pulido PTA

## 2022-12-05 ENCOUNTER — ANESTHESIA (OUTPATIENT)
Dept: OPERATING ROOM | Age: 27
DRG: 621 | End: 2022-12-05
Payer: COMMERCIAL

## 2022-12-05 ENCOUNTER — ANESTHESIA EVENT (OUTPATIENT)
Dept: OPERATING ROOM | Age: 27
DRG: 621 | End: 2022-12-05
Payer: COMMERCIAL

## 2022-12-05 ENCOUNTER — HOSPITAL ENCOUNTER (INPATIENT)
Age: 27
LOS: 1 days | Discharge: HOME | DRG: 621 | End: 2022-12-06
Attending: SURGERY | Admitting: SURGERY
Payer: COMMERCIAL

## 2022-12-05 DIAGNOSIS — E66.01 MORBID OBESITY (HCC): ICD-10-CM

## 2022-12-05 DIAGNOSIS — K21.9 GASTROESOPHAGEAL REFLUX DISEASE, UNSPECIFIED WHETHER ESOPHAGITIS PRESENT: ICD-10-CM

## 2022-12-05 DIAGNOSIS — E28.2 POLYCYSTIC OVARIAN SYNDROME: ICD-10-CM

## 2022-12-05 DIAGNOSIS — Z98.84 S/P LAPAROSCOPIC SLEEVE GASTRECTOMY: Primary | ICD-10-CM

## 2022-12-05 LAB
ANION GAP SERPL CALCULATED.3IONS-SCNC: 12 MMOL/L (ref 9–17)
BUN BLDV-MCNC: 12 MG/DL (ref 6–20)
CALCIUM SERPL-MCNC: 8.6 MG/DL (ref 8.6–10.4)
CHLORIDE BLD-SCNC: 103 MMOL/L (ref 98–107)
CO2: 23 MMOL/L (ref 20–31)
CREAT SERPL-MCNC: 0.6 MG/DL (ref 0.5–0.9)
GFR SERPL CREATININE-BSD FRML MDRD: >60 ML/MIN/1.73M2
GLUCOSE BLD-MCNC: 123 MG/DL (ref 70–99)
HCG, PREGNANCY URINE (POC): NEGATIVE
HCT VFR BLD CALC: 39.4 % (ref 36.3–47.1)
HCT VFR BLD CALC: 41.3 % (ref 36.3–47.1)
HEMOGLOBIN: 12 G/DL (ref 11.9–15.1)
HEMOGLOBIN: 13.1 G/DL (ref 11.9–15.1)
MCH RBC QN AUTO: 27.6 PG (ref 25.2–33.5)
MCH RBC QN AUTO: 27.7 PG (ref 25.2–33.5)
MCHC RBC AUTO-ENTMCNC: 30.5 G/DL (ref 28.4–34.8)
MCHC RBC AUTO-ENTMCNC: 31.7 G/DL (ref 28.4–34.8)
MCV RBC AUTO: 86.9 FL (ref 82.6–102.9)
MCV RBC AUTO: 91 FL (ref 82.6–102.9)
NRBC AUTOMATED: 0 PER 100 WBC
NRBC AUTOMATED: 0 PER 100 WBC
PDW BLD-RTO: 12.8 % (ref 11.8–14.4)
PDW BLD-RTO: 12.9 % (ref 11.8–14.4)
PLATELET # BLD: 282 K/UL (ref 138–453)
PLATELET # BLD: 290 K/UL (ref 138–453)
PMV BLD AUTO: 10 FL (ref 8.1–13.5)
PMV BLD AUTO: 9.9 FL (ref 8.1–13.5)
POC POTASSIUM: 3.6 MMOL/L (ref 3.5–4.5)
POTASSIUM SERPL-SCNC: 4.1 MMOL/L (ref 3.7–5.3)
RBC # BLD: 4.33 M/UL (ref 3.95–5.11)
RBC # BLD: 4.75 M/UL (ref 3.95–5.11)
SODIUM BLD-SCNC: 138 MMOL/L (ref 135–144)
WBC # BLD: 10.8 K/UL (ref 3.5–11.3)
WBC # BLD: 12 K/UL (ref 3.5–11.3)

## 2022-12-05 PROCEDURE — 8E0W4CZ ROBOTIC ASSISTED PROCEDURE OF TRUNK REGION, PERCUTANEOUS ENDOSCOPIC APPROACH: ICD-10-PCS | Performed by: SURGERY

## 2022-12-05 PROCEDURE — 2580000003 HC RX 258: Performed by: SURGERY

## 2022-12-05 PROCEDURE — 3600000019 HC SURGERY ROBOT ADDTL 15MIN: Performed by: SURGERY

## 2022-12-05 PROCEDURE — 2580000003 HC RX 258: Performed by: STUDENT IN AN ORGANIZED HEALTH CARE EDUCATION/TRAINING PROGRAM

## 2022-12-05 PROCEDURE — 6370000000 HC RX 637 (ALT 250 FOR IP): Performed by: SURGERY

## 2022-12-05 PROCEDURE — S2900 ROBOTIC SURGICAL SYSTEM: HCPCS | Performed by: SURGERY

## 2022-12-05 PROCEDURE — 2500000003 HC RX 250 WO HCPCS: Performed by: SURGERY

## 2022-12-05 PROCEDURE — 85027 COMPLETE CBC AUTOMATED: CPT

## 2022-12-05 PROCEDURE — 36415 COLL VENOUS BLD VENIPUNCTURE: CPT

## 2022-12-05 PROCEDURE — 94640 AIRWAY INHALATION TREATMENT: CPT

## 2022-12-05 PROCEDURE — 7100000000 HC PACU RECOVERY - FIRST 15 MIN: Performed by: SURGERY

## 2022-12-05 PROCEDURE — 7100000001 HC PACU RECOVERY - ADDTL 15 MIN: Performed by: SURGERY

## 2022-12-05 PROCEDURE — 6360000002 HC RX W HCPCS: Performed by: ANESTHESIOLOGY

## 2022-12-05 PROCEDURE — 3700000000 HC ANESTHESIA ATTENDED CARE: Performed by: SURGERY

## 2022-12-05 PROCEDURE — 94761 N-INVAS EAR/PLS OXIMETRY MLT: CPT

## 2022-12-05 PROCEDURE — 3700000001 HC ADD 15 MINUTES (ANESTHESIA): Performed by: SURGERY

## 2022-12-05 PROCEDURE — 2720000010 HC SURG SUPPLY STERILE: Performed by: SURGERY

## 2022-12-05 PROCEDURE — 6360000002 HC RX W HCPCS: Performed by: SURGERY

## 2022-12-05 PROCEDURE — 3600000009 HC SURGERY ROBOT BASE: Performed by: SURGERY

## 2022-12-05 PROCEDURE — 80048 BASIC METABOLIC PNL TOTAL CA: CPT

## 2022-12-05 PROCEDURE — 2500000003 HC RX 250 WO HCPCS: Performed by: NURSE ANESTHETIST, CERTIFIED REGISTERED

## 2022-12-05 PROCEDURE — 84132 ASSAY OF SERUM POTASSIUM: CPT

## 2022-12-05 PROCEDURE — 0DJ08ZZ INSPECTION OF UPPER INTESTINAL TRACT, VIA NATURAL OR ARTIFICIAL OPENING ENDOSCOPIC: ICD-10-PCS | Performed by: SURGERY

## 2022-12-05 PROCEDURE — 1200000000 HC SEMI PRIVATE

## 2022-12-05 PROCEDURE — 6360000002 HC RX W HCPCS: Performed by: NURSE ANESTHETIST, CERTIFIED REGISTERED

## 2022-12-05 PROCEDURE — 81025 URINE PREGNANCY TEST: CPT

## 2022-12-05 PROCEDURE — 0DB64Z3 EXCISION OF STOMACH, PERCUTANEOUS ENDOSCOPIC APPROACH, VERTICAL: ICD-10-PCS | Performed by: SURGERY

## 2022-12-05 PROCEDURE — 88307 TISSUE EXAM BY PATHOLOGIST: CPT

## 2022-12-05 PROCEDURE — 2709999900 HC NON-CHARGEABLE SUPPLY: Performed by: SURGERY

## 2022-12-05 RX ORDER — MIDAZOLAM HYDROCHLORIDE 1 MG/ML
INJECTION INTRAMUSCULAR; INTRAVENOUS PRN
Status: DISCONTINUED | OUTPATIENT
Start: 2022-12-05 | End: 2022-12-05 | Stop reason: SDUPTHER

## 2022-12-05 RX ORDER — SODIUM CHLORIDE, SODIUM LACTATE, POTASSIUM CHLORIDE, CALCIUM CHLORIDE 600; 310; 30; 20 MG/100ML; MG/100ML; MG/100ML; MG/100ML
1000 INJECTION, SOLUTION INTRAVENOUS CONTINUOUS
Status: DISCONTINUED | OUTPATIENT
Start: 2022-12-05 | End: 2022-12-05 | Stop reason: HOSPADM

## 2022-12-05 RX ORDER — SODIUM CHLORIDE 9 MG/ML
INJECTION, SOLUTION INTRAVENOUS PRN
Status: DISCONTINUED | OUTPATIENT
Start: 2022-12-05 | End: 2022-12-06 | Stop reason: HOSPADM

## 2022-12-05 RX ORDER — FENTANYL CITRATE 50 UG/ML
INJECTION, SOLUTION INTRAMUSCULAR; INTRAVENOUS PRN
Status: DISCONTINUED | OUTPATIENT
Start: 2022-12-05 | End: 2022-12-05 | Stop reason: SDUPTHER

## 2022-12-05 RX ORDER — HEPARIN SODIUM 5000 [USP'U]/ML
5000 INJECTION, SOLUTION INTRAVENOUS; SUBCUTANEOUS EVERY 8 HOURS SCHEDULED
Status: COMPLETED | OUTPATIENT
Start: 2022-12-05 | End: 2022-12-06

## 2022-12-05 RX ORDER — CYCLOBENZAPRINE HCL 10 MG
10 TABLET ORAL 3 TIMES DAILY PRN
Status: DISCONTINUED | OUTPATIENT
Start: 2022-12-05 | End: 2022-12-06 | Stop reason: HOSPADM

## 2022-12-05 RX ORDER — DEXAMETHASONE SODIUM PHOSPHATE 10 MG/ML
INJECTION INTRAMUSCULAR; INTRAVENOUS PRN
Status: DISCONTINUED | OUTPATIENT
Start: 2022-12-05 | End: 2022-12-05 | Stop reason: SDUPTHER

## 2022-12-05 RX ORDER — OXYCODONE HYDROCHLORIDE AND ACETAMINOPHEN 5; 325 MG/1; MG/1
2 TABLET ORAL EVERY 6 HOURS PRN
Status: DISCONTINUED | OUTPATIENT
Start: 2022-12-05 | End: 2022-12-06 | Stop reason: HOSPADM

## 2022-12-05 RX ORDER — SODIUM CHLORIDE 9 MG/ML
INJECTION, SOLUTION INTRAVENOUS PRN
Status: DISCONTINUED | OUTPATIENT
Start: 2022-12-05 | End: 2022-12-05 | Stop reason: HOSPADM

## 2022-12-05 RX ORDER — IPRATROPIUM BROMIDE AND ALBUTEROL SULFATE 2.5; .5 MG/3ML; MG/3ML
1 SOLUTION RESPIRATORY (INHALATION)
Status: DISCONTINUED | OUTPATIENT
Start: 2022-12-05 | End: 2022-12-06 | Stop reason: HOSPADM

## 2022-12-05 RX ORDER — SODIUM CHLORIDE, SODIUM LACTATE, POTASSIUM CHLORIDE, CALCIUM CHLORIDE 600; 310; 30; 20 MG/100ML; MG/100ML; MG/100ML; MG/100ML
INJECTION, SOLUTION INTRAVENOUS CONTINUOUS
Status: DISCONTINUED | OUTPATIENT
Start: 2022-12-05 | End: 2022-12-06

## 2022-12-05 RX ORDER — SODIUM CHLORIDE 0.9 % (FLUSH) 0.9 %
5-40 SYRINGE (ML) INJECTION EVERY 12 HOURS SCHEDULED
Status: DISCONTINUED | OUTPATIENT
Start: 2022-12-05 | End: 2022-12-06 | Stop reason: HOSPADM

## 2022-12-05 RX ORDER — BUPIVACAINE HYDROCHLORIDE 5 MG/ML
INJECTION, SOLUTION PERINEURAL PRN
Status: DISCONTINUED | OUTPATIENT
Start: 2022-12-05 | End: 2022-12-05 | Stop reason: HOSPADM

## 2022-12-05 RX ORDER — ROCURONIUM BROMIDE 10 MG/ML
INJECTION, SOLUTION INTRAVENOUS PRN
Status: DISCONTINUED | OUTPATIENT
Start: 2022-12-05 | End: 2022-12-05 | Stop reason: SDUPTHER

## 2022-12-05 RX ORDER — SODIUM CHLORIDE 0.9 % (FLUSH) 0.9 %
5-40 SYRINGE (ML) INJECTION PRN
Status: DISCONTINUED | OUTPATIENT
Start: 2022-12-05 | End: 2022-12-06 | Stop reason: HOSPADM

## 2022-12-05 RX ORDER — SCOLOPAMINE TRANSDERMAL SYSTEM 1 MG/1
1 PATCH, EXTENDED RELEASE TRANSDERMAL
Status: DISCONTINUED | OUTPATIENT
Start: 2022-12-05 | End: 2022-12-06 | Stop reason: HOSPADM

## 2022-12-05 RX ORDER — MIDAZOLAM HYDROCHLORIDE 2 MG/2ML
2 INJECTION, SOLUTION INTRAMUSCULAR; INTRAVENOUS ONCE
Status: DISCONTINUED | OUTPATIENT
Start: 2022-12-05 | End: 2022-12-06 | Stop reason: HOSPADM

## 2022-12-05 RX ORDER — PROMETHAZINE HYDROCHLORIDE 25 MG/1
25 TABLET ORAL EVERY 6 HOURS PRN
Status: DISCONTINUED | OUTPATIENT
Start: 2022-12-05 | End: 2022-12-06 | Stop reason: HOSPADM

## 2022-12-05 RX ORDER — MAGNESIUM HYDROXIDE 1200 MG/15ML
LIQUID ORAL CONTINUOUS PRN
Status: DISCONTINUED | OUTPATIENT
Start: 2022-12-05 | End: 2022-12-05 | Stop reason: HOSPADM

## 2022-12-05 RX ORDER — ONDANSETRON 2 MG/ML
4 INJECTION INTRAMUSCULAR; INTRAVENOUS EVERY 6 HOURS PRN
Status: DISCONTINUED | OUTPATIENT
Start: 2022-12-05 | End: 2022-12-06 | Stop reason: HOSPADM

## 2022-12-05 RX ORDER — ONDANSETRON 2 MG/ML
4 INJECTION INTRAMUSCULAR; INTRAVENOUS
Status: DISCONTINUED | OUTPATIENT
Start: 2022-12-05 | End: 2022-12-05 | Stop reason: HOSPADM

## 2022-12-05 RX ORDER — ONDANSETRON 2 MG/ML
INJECTION INTRAMUSCULAR; INTRAVENOUS PRN
Status: DISCONTINUED | OUTPATIENT
Start: 2022-12-05 | End: 2022-12-05 | Stop reason: SDUPTHER

## 2022-12-05 RX ORDER — PANTOPRAZOLE SODIUM 40 MG/1
40 TABLET, DELAYED RELEASE ORAL DAILY
Status: DISCONTINUED | OUTPATIENT
Start: 2022-12-05 | End: 2022-12-06 | Stop reason: HOSPADM

## 2022-12-05 RX ORDER — METOCLOPRAMIDE HYDROCHLORIDE 5 MG/ML
10 INJECTION INTRAMUSCULAR; INTRAVENOUS
Status: DISCONTINUED | OUTPATIENT
Start: 2022-12-05 | End: 2022-12-05 | Stop reason: HOSPADM

## 2022-12-05 RX ORDER — SODIUM CHLORIDE 0.9 % (FLUSH) 0.9 %
5-40 SYRINGE (ML) INJECTION EVERY 12 HOURS SCHEDULED
Status: DISCONTINUED | OUTPATIENT
Start: 2022-12-05 | End: 2022-12-05 | Stop reason: HOSPADM

## 2022-12-05 RX ORDER — OXYCODONE HYDROCHLORIDE AND ACETAMINOPHEN 5; 325 MG/1; MG/1
1 TABLET ORAL EVERY 6 HOURS PRN
Status: DISCONTINUED | OUTPATIENT
Start: 2022-12-05 | End: 2022-12-06 | Stop reason: HOSPADM

## 2022-12-05 RX ORDER — HEPARIN SODIUM 5000 [USP'U]/ML
5000 INJECTION, SOLUTION INTRAVENOUS; SUBCUTANEOUS ONCE
Status: COMPLETED | OUTPATIENT
Start: 2022-12-05 | End: 2022-12-05

## 2022-12-05 RX ORDER — SODIUM CHLORIDE 0.9 % (FLUSH) 0.9 %
5-40 SYRINGE (ML) INJECTION PRN
Status: DISCONTINUED | OUTPATIENT
Start: 2022-12-05 | End: 2022-12-05 | Stop reason: HOSPADM

## 2022-12-05 RX ORDER — PROPOFOL 10 MG/ML
INJECTION, EMULSION INTRAVENOUS PRN
Status: DISCONTINUED | OUTPATIENT
Start: 2022-12-05 | End: 2022-12-05 | Stop reason: SDUPTHER

## 2022-12-05 RX ORDER — DIPHENHYDRAMINE HYDROCHLORIDE 50 MG/ML
INJECTION INTRAMUSCULAR; INTRAVENOUS PRN
Status: DISCONTINUED | OUTPATIENT
Start: 2022-12-05 | End: 2022-12-05 | Stop reason: SDUPTHER

## 2022-12-05 RX ORDER — LIDOCAINE HYDROCHLORIDE 10 MG/ML
INJECTION, SOLUTION EPIDURAL; INFILTRATION; INTRACAUDAL; PERINEURAL PRN
Status: DISCONTINUED | OUTPATIENT
Start: 2022-12-05 | End: 2022-12-05 | Stop reason: SDUPTHER

## 2022-12-05 RX ADMIN — HYDROMORPHONE HYDROCHLORIDE 0.25 MG: 1 INJECTION, SOLUTION INTRAMUSCULAR; INTRAVENOUS; SUBCUTANEOUS at 09:15

## 2022-12-05 RX ADMIN — SODIUM CHLORIDE, POTASSIUM CHLORIDE, SODIUM LACTATE AND CALCIUM CHLORIDE: 600; 310; 30; 20 INJECTION, SOLUTION INTRAVENOUS at 08:20

## 2022-12-05 RX ADMIN — MIDAZOLAM 2 MG: 1 INJECTION INTRAMUSCULAR; INTRAVENOUS at 07:12

## 2022-12-05 RX ADMIN — PHENYLEPHRINE HYDROCHLORIDE 100 MCG: 10 INJECTION INTRAVENOUS at 07:36

## 2022-12-05 RX ADMIN — FENTANYL CITRATE 50 MCG: 50 INJECTION, SOLUTION INTRAMUSCULAR; INTRAVENOUS at 07:44

## 2022-12-05 RX ADMIN — PHENYLEPHRINE HYDROCHLORIDE 100 MCG: 10 INJECTION INTRAVENOUS at 08:12

## 2022-12-05 RX ADMIN — SODIUM CHLORIDE, POTASSIUM CHLORIDE, SODIUM LACTATE AND CALCIUM CHLORIDE 1000 ML: 600; 310; 30; 20 INJECTION, SOLUTION INTRAVENOUS at 06:31

## 2022-12-05 RX ADMIN — HEPARIN SODIUM 5000 UNITS: 5000 INJECTION INTRAVENOUS; SUBCUTANEOUS at 22:39

## 2022-12-05 RX ADMIN — IPRATROPIUM BROMIDE AND ALBUTEROL SULFATE 1 AMPULE: .5; 3 SOLUTION RESPIRATORY (INHALATION) at 21:17

## 2022-12-05 RX ADMIN — ROCURONIUM BROMIDE 50 MG: 10 INJECTION, SOLUTION INTRAVENOUS at 07:12

## 2022-12-05 RX ADMIN — FENTANYL CITRATE 100 MCG: 50 INJECTION, SOLUTION INTRAMUSCULAR; INTRAVENOUS at 07:12

## 2022-12-05 RX ADMIN — LIDOCAINE HYDROCHLORIDE 50 MG: 10 INJECTION, SOLUTION EPIDURAL; INFILTRATION; INTRACAUDAL; PERINEURAL at 07:12

## 2022-12-05 RX ADMIN — HYDROMORPHONE HYDROCHLORIDE 1 MG: 1 INJECTION, SOLUTION INTRAMUSCULAR; INTRAVENOUS; SUBCUTANEOUS at 09:54

## 2022-12-05 RX ADMIN — SODIUM CHLORIDE, POTASSIUM CHLORIDE, SODIUM LACTATE AND CALCIUM CHLORIDE: 600; 310; 30; 20 INJECTION, SOLUTION INTRAVENOUS at 09:58

## 2022-12-05 RX ADMIN — Medication 2000 MG: at 07:22

## 2022-12-05 RX ADMIN — PHENYLEPHRINE HYDROCHLORIDE 100 MCG: 10 INJECTION INTRAVENOUS at 07:47

## 2022-12-05 RX ADMIN — ONDANSETRON 4 MG: 2 INJECTION INTRAMUSCULAR; INTRAVENOUS at 08:20

## 2022-12-05 RX ADMIN — PHENYLEPHRINE HYDROCHLORIDE 100 MCG: 10 INJECTION INTRAVENOUS at 07:58

## 2022-12-05 RX ADMIN — DEXAMETHASONE SODIUM PHOSPHATE 10 MG: 10 INJECTION INTRAMUSCULAR; INTRAVENOUS at 07:25

## 2022-12-05 RX ADMIN — FENTANYL CITRATE 50 MCG: 50 INJECTION, SOLUTION INTRAMUSCULAR; INTRAVENOUS at 08:43

## 2022-12-05 RX ADMIN — Medication 2000 MG: at 14:34

## 2022-12-05 RX ADMIN — FENTANYL CITRATE 50 MCG: 50 INJECTION, SOLUTION INTRAMUSCULAR; INTRAVENOUS at 07:31

## 2022-12-05 RX ADMIN — HEPARIN SODIUM 5000 UNITS: 5000 INJECTION INTRAVENOUS; SUBCUTANEOUS at 06:34

## 2022-12-05 RX ADMIN — HYDROMORPHONE HYDROCHLORIDE 1 MG: 1 INJECTION, SOLUTION INTRAMUSCULAR; INTRAVENOUS; SUBCUTANEOUS at 19:42

## 2022-12-05 RX ADMIN — SUGAMMADEX 300 MG: 100 INJECTION, SOLUTION INTRAVENOUS at 08:27

## 2022-12-05 RX ADMIN — Medication 2000 MG: at 22:37

## 2022-12-05 RX ADMIN — HYDROMORPHONE HYDROCHLORIDE 0.5 MG: 1 INJECTION, SOLUTION INTRAMUSCULAR; INTRAVENOUS; SUBCUTANEOUS at 08:57

## 2022-12-05 RX ADMIN — SODIUM CHLORIDE, POTASSIUM CHLORIDE, SODIUM LACTATE AND CALCIUM CHLORIDE: 600; 310; 30; 20 INJECTION, SOLUTION INTRAVENOUS at 22:02

## 2022-12-05 RX ADMIN — HYDROMORPHONE HYDROCHLORIDE 0.25 MG: 1 INJECTION, SOLUTION INTRAMUSCULAR; INTRAVENOUS; SUBCUTANEOUS at 09:10

## 2022-12-05 RX ADMIN — HYDROMORPHONE HYDROCHLORIDE 0.5 MG: 1 INJECTION, SOLUTION INTRAMUSCULAR; INTRAVENOUS; SUBCUTANEOUS at 09:04

## 2022-12-05 RX ADMIN — SODIUM CHLORIDE, PRESERVATIVE FREE 10 ML: 5 INJECTION INTRAVENOUS at 10:01

## 2022-12-05 RX ADMIN — PROPOFOL 160 MG: 10 INJECTION, EMULSION INTRAVENOUS at 07:12

## 2022-12-05 RX ADMIN — IPRATROPIUM BROMIDE AND ALBUTEROL SULFATE 1 AMPULE: .5; 3 SOLUTION RESPIRATORY (INHALATION) at 12:06

## 2022-12-05 RX ADMIN — ROCURONIUM BROMIDE 20 MG: 10 INJECTION, SOLUTION INTRAVENOUS at 07:32

## 2022-12-05 RX ADMIN — OXYCODONE HYDROCHLORIDE AND ACETAMINOPHEN 2 TABLET: 5; 325 TABLET ORAL at 12:31

## 2022-12-05 RX ADMIN — OXYCODONE HYDROCHLORIDE AND ACETAMINOPHEN 2 TABLET: 5; 325 TABLET ORAL at 23:18

## 2022-12-05 RX ADMIN — DIPHENHYDRAMINE HYDROCHLORIDE 12.5 MG: 50 INJECTION, SOLUTION INTRAMUSCULAR; INTRAVENOUS at 07:25

## 2022-12-05 RX ADMIN — FENTANYL CITRATE 50 MCG: 50 INJECTION, SOLUTION INTRAMUSCULAR; INTRAVENOUS at 08:34

## 2022-12-05 ASSESSMENT — PAIN DESCRIPTION - LOCATION
LOCATION: ABDOMEN
LOCATION: ABDOMEN
LOCATION: ABDOMEN;CHEST
LOCATION: ABDOMEN

## 2022-12-05 ASSESSMENT — PAIN SCALES - GENERAL
PAINLEVEL_OUTOF10: 6
PAINLEVEL_OUTOF10: 7
PAINLEVEL_OUTOF10: 4
PAINLEVEL_OUTOF10: 7
PAINLEVEL_OUTOF10: 6
PAINLEVEL_OUTOF10: 7
PAINLEVEL_OUTOF10: 7
PAINLEVEL_OUTOF10: 4
PAINLEVEL_OUTOF10: 6
PAINLEVEL_OUTOF10: 9
PAINLEVEL_OUTOF10: 8
PAINLEVEL_OUTOF10: 7

## 2022-12-05 ASSESSMENT — PAIN DESCRIPTION - DESCRIPTORS
DESCRIPTORS: SORE
DESCRIPTORS: SORE;PRESSURE
DESCRIPTORS: DISCOMFORT;SHARP
DESCRIPTORS: SORE;CRAMPING

## 2022-12-05 ASSESSMENT — PAIN DESCRIPTION - PAIN TYPE: TYPE: ACUTE PAIN;SURGICAL PAIN

## 2022-12-05 ASSESSMENT — PAIN DESCRIPTION - ORIENTATION: ORIENTATION: LEFT

## 2022-12-05 ASSESSMENT — PAIN - FUNCTIONAL ASSESSMENT: PAIN_FUNCTIONAL_ASSESSMENT: NONE - DENIES PAIN

## 2022-12-05 NOTE — ANESTHESIA POSTPROCEDURE EVALUATION
Department of Anesthesiology  Postprocedure Note    Patient: Jeferson Duncan  MRN: [id number]  [place]: 1995  Date of evaluation: 12/5/2022      Procedure Summary     Date: 12/05/22 Room / Location: [address]    Anesthesia Start: [address] [facility] Anesthesia Stop: 5267    Procedure: XI ROBOTIC LAPAROSCOPIC GASTRIC SLEEVE, EGD Diagnosis:       Morbid obesity ([address]      Gastroesophageal reflux disease, unspecified whether esophagitis present      Polycystic ovarian syndrome      (MORBID OBESITY, GERD, POLYCYSTIC OVARIAN SYNDROME)    Surgeons: Chris Matias DO Responsible Provider: Tomi Miranda MD    Anesthesia Type: general ASA Status: 3          Anesthesia Type: No value filed.     Nagi Phase I: Nagi Score: 9    Nagi Phase II:        Anesthesia Post Evaluation    Patient location during evaluation: PACU  Patient participation: complete - patient participated  Level of consciousness: awake and alert  Pain score: 3  Airway patency: patent  Nausea & Vomiting: no nausea and no vomiting  Complications: no  Cardiovascular status: hemodynamically stable  Respiratory status: acceptable  Hydration status: euvolemic

## 2022-12-05 NOTE — OP NOTE
Operative Note      Patient: David Clark  YOB: 1995  MRN: [id number]    Date of Procedure: 12/5/2022    Pre-Op Diagnosis: MORBID OBESITY, BMI 54.9, GERD, POLYCYSTIC OVARIAN SYNDROME    Post-Op Diagnosis: Same       Procedure(s):  XI ROBOTIC LAPAROSCOPIC GASTRIC SLEEVE, EGD    Surgeon(s):  Chantel Dunaway DO    Assistant:   Resident: Nelly Guevara DO    Anesthesia: General    Estimated Blood Loss (mL): Minimal    Complications: None    Specimens:   ID Type Source Tests Collected by Time Destination   A : Portion of stomach Tissue Stomach SURGICAL PATHOLOGY Chantel Dunaway DO 12/5/2022 0820        Implants:  * No implants in log *      Drains: * No LDAs found *    Findings:   Hemostatic staple lines  Negative leak test  Counts reported to me as correct    Detailed Description of Procedure:   Operative narrative: The patient was taken to the operative suite and administered anesthesia by the anesthetic team.  Next we prepped and draped in normal sterile fashion. Incision was then made at Martinez's point for a 12 mm port. The abdomen was surveyed and we entered the abdomen using a optical Visiport trocar of 12 mm in size. This was accomplished at Martinez's point. Pneumoperitoneum was then established without complication, the underlying area surveyed. We then placed 4 other ports in standard fashion under direct laparoscopic visualization. Attention was then turned towards placement of the [facility] liver retractor. Small incision made just inferior to the xiphoid process for the liver retractor. The liver retractor was then placed under direct laparoscopic visualization in order to facilitate visualization of the stomach and hiatus. No visible hiatal hernia. We then turned our attention towards formation of the gastric sleeve.   Starting at 6 cm proximal to the pylorus bite dissection was undertaken of the greater curvature and the short gastric vessels taken down using the Vessel Sealer. We mobilized this from our 6 cm starting point to the left negin. Satisfactory mobilization was undertaken and there were noted to be no adhesions posteriorly on the stomach between the stomach and the pancreas or retroperitoneum. I then had anesthesia pass a 36 Kinyarwanda bougie under direct visualization. This was visualized at the tip of the bougie as well as along the lesser curve. I then placed my first staple load a green 60 mm load to start sleeve formation. The bougie was noted be along the lesser curve and was freely mobile before the first green load was fired. A second green load 60 mm stapler was then placed again using the bougie for assistance and guidance along the lesser curvature. I then used blue 60 mm stapler loads to complete sleeve formation. The last staple load was noted to fire in proper orientation to prevent staple line formation along the esophageal fibers. Satisfactory sleeve formation was noted at that time and the staple line was hemostatic. I then had the bougie removed. I then reinforced my staple line with 3-0 Vicryl sutures along the length of the staple line at the staple line confluences. At that time leak test was then started. The patient was placed in a Trendelenburg position and we irrigated the upper abdomen with saline. I then passed an Olympus endoscope into the oropharynx down the esophagus under direct visualization. The scope was passed down through the esophagus down into the lumen of the new gastric sleeve the scope passed easily through the incisura and into the antrum. The scope was then passed into the duodenum through the pylorus. The pylorus and duodenum appeared intact and the scope was slowly withdrawn while visualizing the staple line throughout the course of the staple line. I then clamped distally by applying pressure near the pyloric region to allow for distention of the gastric sleeve.   We then further irrigated and there was no evidence of leak using a bubble test.  The staple line on the inner lumen of the stomach appeared intact and hemostatic throughout. The scope was slowly withdrawn to the GE junction and no evidence of stricture noted. The scope was then withdrawn from the esophagus and no other lesion noted. Attention was then turned back towards the abdomen the abdomen was aspirated of the prior placed saline for leak test.  I then placed an anchoring suture using 3-0 Vicryl suture to prevent torsion of the sleeve and migration of the sleeve superiorly. Specimen was withdrawn from the left upper quadrant incision. We then irrigated this incision thoroughly with saline. Next counts reported to me as correct. The 12 mm port sites were then closed using a suture passer to pass 0 Vicryl suture under direct laparoscopic visualization for proper fascial reapproximation at each port site. All ports were then removed. Pneumoperitoneum was released in entirety. The skin was then closed using 4-0 Vicryl subcuticular stitches in interrupted fashion. The region was cleaned with sterile normal saline followed by placement of TinCoBen and Steri-Strips and sterile bandage. The patient tolerated the procedure well and was transferred to PACU. The patient's family was updated postoperatively.        Electronically signed by Kathie Brady DO on 12/5/2022 at 8:29 AM

## 2022-12-05 NOTE — INTERVAL H&P NOTE
Pt Name: Austin Brian  MRN: [id number]  YOB: 1995  Date of evaluation: 12/5/2022    I have reviewed the patient's history and physical examination completed in pre-admission testing on 11/21/22    No changes to history or on examination today, unless noted below. None. Medical clearance on file.      Valentino Belts, APRN - CNP  12/5/22  6:39 AM

## 2022-12-05 NOTE — ANESTHESIA PRE PROCEDURE
Department of Anesthesiology  Preprocedure Note       Name:  Juan Clark   Age:  33 y.o.  :  1995                                          MRN:  [id number]         Date:  2022      Surgeon: Viktoriya Escalona):  Lawrence Elizalde DO    Procedure: Procedure(s):  XI ROBOTIC LAPAROSCOPIC GASTRIC SLEEVE, EGD, LIVER BIOPSY- GI SCHEDULED    Medications prior to admission:   Prior to Admission medications    Medication Sig Start Date End Date Taking? Authorizing Provider   etonogestrel-ethinyl estradiol (NUVARING) 0.12-0.015 MG/24HR vaginal ring Place 1 each vaginally See Admin Instructions  Patient not taking: No sig reported    Historical Provider, MD   acetaminophen (TYLENOL) 500 MG tablet Take 1,000 mg by mouth every 6 hours as needed for Pain    Historical Provider, MD   Multiple Vitamin (MULTIVITAMIN ADULT PO) Take by mouth    Historical Provider, MD       Current medications:    Current Facility-Administered Medications   Medication Dose Route Frequency Provider Last Rate Last Admin    lactated ringers infusion 1,000 mL  1,000 mL IntraVENous Continuous Estela Sanchez MD 50 mL/hr at 22 0631 1,000 mL at 22 0631    ceFAZolin (ANCEF) 2000 mg in sterile water 20 mL IV syringe  2,000 mg IntraVENous Once Lawrence Elizalde DO           Allergies:  No Known Allergies    Problem List:    Patient Active Problem List   Diagnosis Code    PCO (polycystic ovaries) E28.2    Morbid obesity with BMI of 50.0-59.9, adult ([facility] [address] E66.01, Z68.43    GERD without esophagitis K21.9    HTN (hypertension) I10    Fatigue R53.83    Generalized anxiety disorder F41.1    Lumbar radiculopathy M54.16    Migraine with aura G43. 109    Vitamin D deficiency E55.9       Past Medical History:        Diagnosis Date    ADHD (attention deficit hyperactivity disorder)     I was on medication when i was younger but as i got older it my adhd wasnt as bad but I still sometimes have moments on not being able to focus l.  Headache 7/1/22    From muscle tightness and stiffness in my neck and shoulders    HTN (hypertension)     does not take meds    Obesity     PCOS (polycystic ovarian syndrome)     Dr. Romario Alvarez examination     Venetta Se CNP Loren [facility]/[place] last visit 11/2022. appt 11/22/2022 for medical clearance       Past Surgical History:        Procedure Laterality Date    UPPER GASTROINTESTINAL ENDOSCOPY N/A 03/04/2022    EGD BIOPSY performed by Astrid Cherry DO at [facility] History:    Social History     Tobacco Use    Smoking status: Never     Passive exposure: Current ( - outside)    Smokeless tobacco: Never   Substance Use Topics    Alcohol use: Not Currently     Comment: rare                                Counseling given: Not Answered      Vital Signs (Current):   Vitals:    12/05/22 0609   BP: (!) 146/99   Pulse: 86   Resp: 18   Temp: 96.8 °F (36 °C)   TempSrc: Temporal   SpO2: 96%   Weight: 254 lb (115.2 kg)   Height: 4' 9\" (1.448 m)                                              BP Readings from Last 3 Encounters:   12/05/22 (!) 146/99   11/21/22 (!) 146/96   11/23/22 (!) 140/88       NPO Status: Time of last liquid consumption: 2300                        Time of last solid consumption: 2300                        Date of last liquid consumption: 12/04/22                        Date of last solid food consumption: 11/21/22    BMI:   Wt Readings from Last 3 Encounters:   12/05/22 254 lb (115.2 kg)   11/21/22 254 lb (115.2 kg)   11/22/22 260 lb 6.4 oz (118.1 kg)     Body mass index is 54.97 kg/m².     CBC:   Lab Results   Component Value Date/Time    WBC 9.0 11/04/2022 09:38 AM    RBC 4.78 11/04/2022 09:38 AM    HGB 13.2 11/04/2022 09:38 AM    HCT 40.3 11/04/2022 09:38 AM    MCV 84.3 11/04/2022 09:38 AM    RDW 12.5 11/04/2022 09:38 AM     11/04/2022 09:38 AM       CMP:   Lab Results   Component Value Date/Time     11/04/2022 09:38 AM K 4.7 11/04/2022 09:38 AM     11/04/2022 09:38 AM    CO2 21 11/04/2022 09:38 AM    BUN 18 11/04/2022 09:38 AM    CREATININE 0.46 11/04/2022 09:38 AM    GFRAA >60 02/22/2022 09:16 AM    LABGLOM >60 11/04/2022 09:38 AM    GLUCOSE 84 02/22/2022 09:16 AM    PROT 7.7 11/04/2022 09:38 AM    CALCIUM 8.8 11/04/2022 09:38 AM    BILITOT 0.9 11/04/2022 09:38 AM    ALKPHOS 94 11/04/2022 09:38 AM    AST 28 11/04/2022 09:38 AM    ALT 23 11/04/2022 09:38 AM       POC Tests:   Recent Labs     12/05/22  0628   POCK 3.6       Coags:   Lab Results   Component Value Date/Time    PROTIME 10.4 11/21/2022 11:56 AM    INR 1.0 11/21/2022 11:56 AM    APTT 24.5 11/21/2022 11:56 AM       HCG (If Applicable):   Lab Results   Component Value Date    HCG NEGATIVE 03/04/2022    HCGQUANT <1 05/18/2021        ABGs: No results found for: PHART, PO2ART, ODZ4YEQ, USN3NQR, BEART, S4GXUQSH     Type & Screen (If Applicable):  No results found for: LABABO, LABRH    Drug/Infectious Status (If Applicable):  No results found for: HIV, HEPCAB    COVID-19 Screening (If Applicable): No results found for: COVID19        Anesthesia Evaluation    Airway: Mallampati: II     Neck ROM: full     Dental: normal exam         Pulmonary: breath sounds clear to auscultation                             Cardiovascular:    (+) hypertension:,         Rhythm: regular  Rate: normal                    Neuro/Psych:   (+) neuromuscular disease:, headaches:, psychiatric history:            GI/Hepatic/Renal:   (+) GERD:, morbid obesity          Endo/Other: Negative Endo/Other ROS                    Abdominal:   (+) obese,           Vascular: Other Findings:           Anesthesia Plan      general     ASA 3       Induction: intravenous. Anesthetic plan and risks discussed with patient. Plan discussed with CRNA.                     Radha Green MD   12/5/2022

## 2022-12-06 VITALS
SYSTOLIC BLOOD PRESSURE: 152 MMHG | HEART RATE: 81 BPM | DIASTOLIC BLOOD PRESSURE: 87 MMHG | OXYGEN SATURATION: 95 % | RESPIRATION RATE: 16 BRPM | BODY MASS INDEX: 54.8 KG/M2 | HEIGHT: 57 IN | TEMPERATURE: 97.8 F | WEIGHT: 254 LBS

## 2022-12-06 LAB
ANION GAP SERPL CALCULATED.3IONS-SCNC: 13 MMOL/L (ref 9–17)
BUN BLDV-MCNC: 7 MG/DL (ref 6–20)
CALCIUM SERPL-MCNC: 8.7 MG/DL (ref 8.6–10.4)
CHLORIDE BLD-SCNC: 103 MMOL/L (ref 98–107)
CO2: 19 MMOL/L (ref 20–31)
CREAT SERPL-MCNC: 0.43 MG/DL (ref 0.5–0.9)
GFR SERPL CREATININE-BSD FRML MDRD: >60 ML/MIN/1.73M2
GLUCOSE BLD-MCNC: 117 MG/DL (ref 70–99)
POTASSIUM SERPL-SCNC: 4.2 MMOL/L (ref 3.7–5.3)
SODIUM BLD-SCNC: 135 MMOL/L (ref 135–144)
SURGICAL PATHOLOGY REPORT: NORMAL

## 2022-12-06 PROCEDURE — 94761 N-INVAS EAR/PLS OXIMETRY MLT: CPT

## 2022-12-06 PROCEDURE — 94640 AIRWAY INHALATION TREATMENT: CPT

## 2022-12-06 PROCEDURE — 6370000000 HC RX 637 (ALT 250 FOR IP): Performed by: SURGERY

## 2022-12-06 PROCEDURE — 6360000002 HC RX W HCPCS: Performed by: SURGERY

## 2022-12-06 PROCEDURE — 2580000003 HC RX 258: Performed by: SURGERY

## 2022-12-06 RX ORDER — ENOXAPARIN SODIUM 100 MG/ML
40 INJECTION SUBCUTANEOUS 2 TIMES DAILY
Qty: 11.2 ML | Refills: 0 | Status: SHIPPED | OUTPATIENT
Start: 2022-12-06 | End: 2022-12-20

## 2022-12-06 RX ORDER — PANTOPRAZOLE SODIUM 40 MG/1
40 TABLET, DELAYED RELEASE ORAL DAILY
Qty: 30 TABLET | Refills: 3 | Status: SHIPPED | OUTPATIENT
Start: 2022-12-06

## 2022-12-06 RX ORDER — PROMETHAZINE HYDROCHLORIDE 25 MG/1
25 TABLET ORAL EVERY 6 HOURS PRN
Qty: 28 TABLET | Refills: 0 | Status: SHIPPED | OUTPATIENT
Start: 2022-12-06 | End: 2022-12-13

## 2022-12-06 RX ORDER — OXYCODONE HYDROCHLORIDE AND ACETAMINOPHEN 5; 325 MG/1; MG/1
1 TABLET ORAL EVERY 6 HOURS PRN
Qty: 28 TABLET | Refills: 0 | Status: SHIPPED | OUTPATIENT
Start: 2022-12-06 | End: 2022-12-13

## 2022-12-06 RX ORDER — CYCLOBENZAPRINE HCL 10 MG
10 TABLET ORAL 3 TIMES DAILY PRN
Qty: 30 TABLET | Refills: 0 | Status: SHIPPED | OUTPATIENT
Start: 2022-12-06 | End: 2022-12-16

## 2022-12-06 RX ADMIN — HEPARIN SODIUM 5000 UNITS: 5000 INJECTION INTRAVENOUS; SUBCUTANEOUS at 06:42

## 2022-12-06 RX ADMIN — OXYCODONE HYDROCHLORIDE AND ACETAMINOPHEN 2 TABLET: 5; 325 TABLET ORAL at 07:01

## 2022-12-06 RX ADMIN — PANTOPRAZOLE SODIUM 40 MG: 40 TABLET, DELAYED RELEASE ORAL at 08:32

## 2022-12-06 RX ADMIN — OXYCODONE HYDROCHLORIDE AND ACETAMINOPHEN 2 TABLET: 5; 325 TABLET ORAL at 12:49

## 2022-12-06 RX ADMIN — SODIUM CHLORIDE, POTASSIUM CHLORIDE, SODIUM LACTATE AND CALCIUM CHLORIDE: 600; 310; 30; 20 INJECTION, SOLUTION INTRAVENOUS at 06:32

## 2022-12-06 RX ADMIN — IPRATROPIUM BROMIDE AND ALBUTEROL SULFATE 1 AMPULE: .5; 3 SOLUTION RESPIRATORY (INHALATION) at 08:29

## 2022-12-06 ASSESSMENT — PAIN SCALES - GENERAL
PAINLEVEL_OUTOF10: 7
PAINLEVEL_OUTOF10: 5
PAINLEVEL_OUTOF10: 8
PAINLEVEL_OUTOF10: 5
PAINLEVEL_OUTOF10: 5

## 2022-12-06 ASSESSMENT — PAIN DESCRIPTION - LOCATION: LOCATION: ABDOMEN;CHEST

## 2022-12-06 ASSESSMENT — PAIN DESCRIPTION - ORIENTATION: ORIENTATION: ANTERIOR

## 2022-12-06 ASSESSMENT — PAIN DESCRIPTION - DESCRIPTORS: DESCRIPTORS: DISCOMFORT

## 2022-12-06 ASSESSMENT — PAIN DESCRIPTION - PAIN TYPE: TYPE: ACUTE PAIN;SURGICAL PAIN

## 2022-12-06 NOTE — PROGRESS NOTES
Pt told writer she had drank some cranberry juice earlier. I explained to pt she can not have that rt it is red and has sugar in it. Pt states I know but I cant take pills with water. I told pt she had diet lemon aide on her tray. Pt also was just instructed only 30 ml every 15 minutes. Right after I told pt that she drank 60 ml all at once. Pt states I know  and keep forgetting just Reinforced to pt no red no straws no caffiene no sugar. Pt verbalized understanding to both.

## 2022-12-06 NOTE — PROGRESS NOTES
Bariatric Surgery Progress Note            PATIENT NAME: Tanya Jaramillo     TODAY'S DATE: 12/6/2022, 6:24 AM      SUBJECTIVE:    Pt seen and examined at bedside. No events overnight. Pain well controlled on current therapy. Tolerating bariatric CLD. Ambulating the halls numerous times. Denies N/V/F/C. Voiding without difficulty. No other complaints noted. VSS, afebrile     OBJECTIVE:   VITALS:  /75   Pulse 60   Temp 98 °F (36.7 °C) (Oral)   Resp 18   Ht 4' 9\" (1.448 m)   Wt 254 lb (115.2 kg)   SpO2 97%   BMI 54.97 kg/m²      INTAKE/OUTPUT:      Intake/Output Summary (Last 24 hours) at 12/6/2022 8843  Last data filed at 12/6/2022 0000  Gross per 24 hour   Intake 1000 ml   Output 610 ml   Net 390 ml       CONSTITUTIONAL:  NAD, A&O x 3  HEENT: EOMI, moist mucous membranes  LUNGS:  normal effort with symmetric rise and fall of chest wall  CARDIOVASCULAR:  regular rate and rhythm  ABDOMEN: Soft, nondistended, appropriately TTP, port sites I/C/D, abdominal binder in place  EXTREMITIES: no rashes, lesions, edema.       Data:  CBC with Differential:    Lab Results   Component Value Date/Time    WBC 12.0 12/05/2022 11:42 PM    RBC 4.33 12/05/2022 11:42 PM    HGB 12.0 12/05/2022 11:42 PM    HCT 39.4 12/05/2022 11:42 PM     12/05/2022 11:42 PM    MCV 91.0 12/05/2022 11:42 PM    MCH 27.7 12/05/2022 11:42 PM    MCHC 30.5 12/05/2022 11:42 PM    RDW 12.8 12/05/2022 11:42 PM    LYMPHOPCT 21 11/04/2022 09:38 AM    MONOPCT 6 11/04/2022 09:38 AM    BASOPCT 0 11/04/2022 09:38 AM    MONOSABS 0.51 11/04/2022 09:38 AM    LYMPHSABS 1.86 11/04/2022 09:38 AM    EOSABS 0.14 11/04/2022 09:38 AM    BASOSABS 0.03 11/04/2022 09:38 AM     BMP:    Lab Results   Component Value Date/Time     12/05/2022 11:42 PM    K 4.2 12/05/2022 11:42 PM     12/05/2022 11:42 PM    CO2 19 12/05/2022 11:42 PM    BUN 7 12/05/2022 11:42 PM    LABALBU 3.8 11/04/2022 09:38 AM    CREATININE 0.43 12/05/2022 11:42 PM    CALCIUM 8.7 12/05/2022 11:42 PM    GFRAA >60 02/22/2022 09:16 AM    LABGLOM >60 12/05/2022 11:42 PM    GLUCOSE 117 12/05/2022 11:42 PM         ASSESSMENT   Patient Active Problem List   Diagnosis    PCO (polycystic ovaries)    Morbid obesity with BMI of 50.0-59.9, adult (HCC)    GERD without esophagitis    HTN (hypertension)    Fatigue    Generalized anxiety disorder    Lumbar radiculopathy    Migraine with aura    Vitamin D deficiency    S/P laparoscopic sleeve gastrectomy       33 y.o. F POD#1 s/p robotic assisted laparoscopic gastric sleeve, EGD, 2/2 MO      Plan  Continue bariatric CLD. IVF at 125 ml/hr with LR  F/u AM labs  Pain control with Percocet, Dilaudid PRN. Nausea control with Zofran PRN  Encourage ambulation and IS usages. OOB and onto chair    DVT prophylaxis with Heparin TID. GI prophylaxis with Protonix. Dispo:  Anticipate home today    Electronically signed by Kiya Del Cid DO  on 12/6/2022 at 6:24 AM

## 2022-12-06 NOTE — PROGRESS NOTES
Discussed bariatric discharge instructions with patient , allowed time for questions, had patient demonstrate IS, lovenox teaching done and voices understanding.

## 2022-12-06 NOTE — PROGRESS NOTES
Dr Fransico Mccabe left message for patient to call back Pt and pt's  given dc instructions pt instructed to use incentive spirometer 10 times every hour  pt instructed to take hibiclens shower everyday. Pt instructed to rotate sites when giving lovenox . Pt states her mom will be giving her the shots and Estefania Yang RN from Dr Meghan Jain office gave her dc instructions on lovenox. Pt states her mom feels ok to give her the shots . Pt verbalizes understanding  to above . Pt not drinking enough will be dc later this afternoon.  Meds to beds delivered

## 2022-12-06 NOTE — PLAN OF CARE
Problem: Discharge Planning  Goal: Discharge to home or other facility with appropriate resources  12/6/2022 1002 by Navjot Elkins RN  Outcome: Completed  12/6/2022 0348 by Stephen Alvarez RN  Outcome: Progressing     Problem: Pain  Goal: Verbalizes/displays adequate comfort level or baseline comfort level  12/6/2022 1002 by Navjot Elkins RN  Outcome: Completed  12/6/2022 0348 by Stephen Alvarez RN  Outcome: Progressing     Problem: ABCDS Injury Assessment  Goal: Absence of physical injury  Outcome: Completed     Problem: Safety - Adult  Goal: Free from fall injury  Outcome: Completed

## 2022-12-06 NOTE — DISCHARGE INSTRUCTIONS
Discharge Instructions for Bariatric Surgery     You had a Laparoscopic Sleeve Gastrectomy (95449) surgery to treat obesity. Recovery from this surgery can take several weeks and requires permanent lifestyle changes. What You Will Need   Protein Supplements   Bariatric Vitamins  Abdominal Binder  Incentive spirometer (a breathing device)       Home Care     It is important to keep the incisions clean and dry to promote healing. Shower with soap and rinse and dry thoroughly. If incisions are oozing, you may cover with clean gauze. Use the incentive spirometer every couple of hours. This is to make sure you are breathing deeply and keeping the air sacs within your lungs as open as possible to prevent respiratory problems. Diet    It is important to follow the diet progression very closely in order to prevent complications. When arriving home, follow the Phase 1A Liquid Diet for two weeks. Dehydration and changes in bowel habits can occur after surgery. Refer to your educational binder provided pre-op for additional information. Once you move to solids, food must be chewed well. When making food choices, you'll need to ensure adequate protein intake, while avoiding sweets and fatty foods. Eating too much or too quickly can cause vomiting or intense pain under your breastbone. Most people quickly learn how much food they can tolerate. Physical Activity    When home, we recommend that you take frequent walks, as tolerated, to prevent complications and increase your endurance. Ask your doctor when you will be able to return to work. You may need to wait 2-6 weeks. Do not drive unless you are no longer using pain medications  Do not lift anything over ten pounds for 4 weeks. Medications    Remember to avoid aspirin, aspirin-containing products, and nonsteroidal anti-inflammatory drugs (NSAIDs, such as ibuprofen, naproxen, etc.).    If you were taking these medications before the procedure, and had to stop, ask your doctor when you can resume taking them. Be sure to review your medications with your primary care provider at your follow up office visit. Lifestyle Changes    Changing your diet and level of activity are the biggest lifestyle changes associated with your success. Be aware that you may have emotional ups and downs after this surgery. Follow-up   Follow up with your primary care physician in one week. Follow up with Dr. Candise Merlin in 1 week. If you don't already have a scheduled  appointment, please call the office at [phone]. Call Your Doctor If Any of the Following Occurs   Monitor your recovery once you leave the hospital. If any of the following occur, call your doctor:   Signs of infection, including fever above 100F    Redness, swelling, increasing pain, excessive bleeding, or any discharge from the incision site   Persistent nausea and/or vomiting   Pain that you can't control with the medications you've been given   Shortness of breath and/or chest pain   Tachycardia (racing heart sensation) unrelieved by rest  Pain, redness and/or swelling in your feet or legs    Sudden onset of severe left shoulder pain or severe abdominal pain  Any symptoms that are causing you concern   In case of an emergency, call 911 immediately.

## 2022-12-06 NOTE — PROGRESS NOTES
Pt dc to front door per ambulating with  and dc to home by private car and  pt left with all belongings

## 2022-12-06 NOTE — PLAN OF CARE
Post op check       POST-OPERATIVE PROGRESS NOTE    Patient: Juan Clark    DATE: 12/6/2022    Surgery: Robotic Sleeve Gastrectomy     Subjective:   Pt states that she is doing well. Pt's pain at the surgical site has improved with medication. Tolerating liquid diet and having normal bowel movements. Increasing activity without difficulty. Has passed urine. Objective:  Vital signs and Nurse's note reviewed  Post-op vital signs: stable    /86   Pulse 54   Temp 98.7 °F (37.1 °C) (Oral)   Resp 18   Ht 4' 9\" (1.448 m)   Wt 254 lb (115.2 kg)   SpO2 94%   BMI 54.97 kg/m²    Gen:  A&Ox3, NAD  CV: RRR, no m/h/t  Resp: LCTAB, no wheeze or rhonchi  Abd:  Soft, appropriately tender, nd, wounds clean, dry and intact; no erythema induration or exudate  Ext:  Warm, no cyanosis or edema    Assessment:   POD # 0 S/P Robotic Sleeve Gastrectomy   Recovering well post-op     Plan:    Continue current care  Pain control  Diet- continue bariatric liquid diet  Increase activity as tolerated.   Wound care     Electronically signed by Estrellita Rodrigez MD  on 12/6/2022 at 1:58 AM

## 2022-12-06 NOTE — PROGRESS NOTES
CLINICAL PHARMACY NOTE: MEDS TO BEDS    Total # of Prescriptions Filled: 5   The following medications were delivered to the patient:  Enoxaparin  Promethazine  Oxycodone-APAP  Cyclobenzaprine  Pantoprazole    Additional Documentation:  Delivered to patient, 1 guest, RN, and clinical support at 11:11am. C2 escribed. Paid $50 even with [place].  HR

## 2022-12-07 ENCOUNTER — TELEPHONE (OUTPATIENT)
Dept: BARIATRICS/WEIGHT MGMT | Age: 27
End: 2022-12-07

## 2022-12-07 NOTE — TELEPHONE ENCOUNTER
Post-op outreach call made to pt. Pt reports frequent ambulation around home. Pt wearing abd binder. No complaints of SOB or tachycardia. Laparoscopic incisional sites without problems. Pt has not had a BM since surgery. Not Passing flatus. Agrees to use Milk of Magnesia or Miriaax and monitor for BM. Pt reports urine output of at least 4 times in a 24 hour period. Intake is described as 24 oz, encouraged patient to drink an ounce of water every 15 min. Will start protein shake today     Rates pain as 7 on a 0-10 scale. Pt using pain medication as directed. Allowed pt the opportunity to ask questions. Reminded patient to reference the patient education materials as needed. Reminded pt that they may phone the office or the \"after hours telephone number\"  that is listed in the patient education binder as needed. Pt verbalized understanding. Pt without concerns at this time      Post op office appt date and time reviewed with pt.     Has Lovenox is going well

## 2022-12-08 NOTE — PROGRESS NOTES
Surgery Discharge Summary     Patient Identification  Alison Cole is a 33 y.o. female. :  1995  Admit Date:  2022    Discharge date:   2022  3:44 PM                                   Disposition: home    Discharge Diagnoses:   Patient Active Problem List   Diagnosis    PCO (polycystic ovaries)    Morbid obesity with BMI of 50.0-59.9, adult ([address]    GERD without esophagitis    HTN (hypertension)    Fatigue    Generalized anxiety disorder    Lumbar radiculopathy    Migraine with aura    Vitamin D deficiency    S/P laparoscopic sleeve gastrectomy       Condition on discharge: Good    Consults: None    Surgery: Robotic gastric sleeve    Patient Instructions: Activity: no heavy lifting, pushing, pulling for 6 weeks, no driving for 2 weeks or while on analgesics  Diet: As tolerated  Follow-up with Dr. Shaista Sy in 2 weeks. See pre-printed instructions in chart and given to patient upon discharge. Discharge Medications:        Medication List        START taking these medications      cyclobenzaprine 10 MG tablet  Commonly known as: FLEXERIL  Take 1 tablet by mouth 3 times daily as needed for Muscle spasms     enoxaparin 40 MG/0.4ML  Commonly known as: Lovenox  Inject 0.4 mLs into the skin 2 times daily for 14 days     oxyCODONE-acetaminophen 5-325 MG per tablet  Commonly known as: PERCOCET  Take 1 tablet by mouth every 6 hours as needed for Pain for up to 7 days.      pantoprazole 40 MG tablet  Commonly known as: PROTONIX  Take 1 tablet by mouth daily     promethazine 25 MG tablet  Commonly known as: PHENERGAN  Take 1 tablet by mouth every 6 hours as needed for Nausea            CONTINUE taking these medications      acetaminophen 500 MG tablet  Commonly known as: TYLENOL            STOP taking these medications      MULTIVITAMIN ADULT PO            ASK your doctor about these medications      etonogestrel-ethinyl estradiol 0.12-0.015 MG/24HR vaginal ring  Commonly known as: Mariela Correa Where to Get Your Medications        These medications were sent to [id number], [address]  [address] [facility], [address] [place] [address]      Phone: [phone]   enoxaparin 40 MG/0.4ML       These medications were sent to [id number] [facility], [address] [facility]  [address], [address] [address]      Phone: [phone]   cyclobenzaprine 10 MG tablet  oxyCODONE-acetaminophen 5-325 MG per tablet  pantoprazole 40 MG tablet  promethazine 25 MG tablet          HPI and Hospital Course:   33 y.o. female presented on 12/5/2022 for an elective robotic laparoscopic gastric sleeve and EGD. Patient underwent the procedure well without complication. [facility] Hospital course was unremarkable. On day of discharge pt was tolerating a bariatric diet, pain controlled with oral medications and ambulating without difficulty.       Electronically signed by Uriel Jimenez DO on 12/8/2022 at 4:29 PM

## 2022-12-09 ENCOUNTER — HOSPITAL ENCOUNTER (OUTPATIENT)
Dept: ONCOLOGY | Age: 27
Discharge: HOME | End: 2022-12-09
Attending: SURGERY | Admitting: SURGERY
Payer: COMMERCIAL

## 2022-12-09 VITALS
SYSTOLIC BLOOD PRESSURE: 159 MMHG | TEMPERATURE: 97.9 F | HEART RATE: 97 BPM | OXYGEN SATURATION: 98 % | DIASTOLIC BLOOD PRESSURE: 106 MMHG | RESPIRATION RATE: 16 BRPM

## 2022-12-09 PROBLEM — E86.0 DEHYDRATION: Status: ACTIVE | Noted: 2022-12-09

## 2022-12-09 PROCEDURE — 96360 HYDRATION IV INFUSION INIT: CPT

## 2022-12-09 PROCEDURE — 96366 THER/PROPH/DIAG IV INF ADDON: CPT

## 2022-12-09 PROCEDURE — 96361 HYDRATE IV INFUSION ADD-ON: CPT

## 2022-12-09 PROCEDURE — 2580000003 HC RX 258: Performed by: SURGERY

## 2022-12-09 PROCEDURE — 96365 THER/PROPH/DIAG IV INF INIT: CPT

## 2022-12-09 RX ORDER — SODIUM CHLORIDE 9 MG/ML
INJECTION, SOLUTION INTRAVENOUS ONCE
Status: COMPLETED | OUTPATIENT
Start: 2022-12-09 | End: 2022-12-09

## 2022-12-09 RX ADMIN — SODIUM CHLORIDE: 9 INJECTION, SOLUTION INTRAVENOUS at 13:10

## 2022-12-09 RX ADMIN — SODIUM CHLORIDE: 9 INJECTION, SOLUTION INTRAVENOUS at 14:35

## 2022-12-09 NOTE — PLAN OF CARE
1255 Patient arrived for infusion. IV placed without complication. VSS as charted. Patient tolerated infusion well. Patient declined liquid tray. Patient left infusion center with all belongings and steady gate.   [facility]

## 2022-12-15 ENCOUNTER — OFFICE VISIT (OUTPATIENT)
Dept: BARIATRICS/WEIGHT MGMT | Age: 27
End: 2022-12-15

## 2022-12-15 VITALS
HEIGHT: 57 IN | HEART RATE: 88 BPM | SYSTOLIC BLOOD PRESSURE: 130 MMHG | TEMPERATURE: 96.1 F | RESPIRATION RATE: 20 BRPM | DIASTOLIC BLOOD PRESSURE: 70 MMHG | WEIGHT: 234 LBS | BODY MASS INDEX: 50.48 KG/M2

## 2022-12-15 DIAGNOSIS — Z98.84 STATUS POST LAPAROSCOPIC SLEEVE GASTRECTOMY: Primary | ICD-10-CM

## 2022-12-15 PROCEDURE — 99024 POSTOP FOLLOW-UP VISIT: CPT | Performed by: SURGERY

## 2022-12-15 NOTE — PROGRESS NOTES
Medical Nutrition Therapy  Metabolic and Bariatric surgery  1 week Post operative follow up         Pt reports: She is doing well tolerating fluids, drinking protein shake, and taking vitamins. Vitals:   Vitals:    12/15/22 1123   BP: 130/70   Site: Left Upper Arm   Position: Sitting   Cuff Size: Large Adult   Pulse: 88   Resp: 20   Temp: (!) 96.1 °F (35.6 °C)   Weight: 234 lb (106.1 kg)   Height: 4' 9\" (1.448 m)      Body mass index is 50.64 kg/m². Nutrition Assessment:   PES: Inadequate food and beverage intake r/t WLS as evidenced by loss of excess body weight lost 20 lbs over 1 week. Goals   60-80gm of protein  48-64oz of fluid       [x] met     []  Not met        Plan:  Take 2000 IU of vitamin D daily in chewable form or ensure MVI meets recommendations.  Pt questions answered re diet advancement;  F/u 5 weeks post-op      Isabel Wise, MS, RD, LD

## 2022-12-15 NOTE — PROGRESS NOTES
[address] [facility] INVASIVE BARIATRIC SURG  [address] [place] [address] [place]  [address]  [facility] [address]  Dept: [phone]    SURGICAL WEIGHT LOSS MANAGEMENT PROGRAM  PROGRESS NOTE     CC: Weight Loss    Patient: Blanco Gordon      Service Date: 12/15/2022  Visit:   1 week    Medical Record #: [id number]  Date of Surgery:   12/05/2022    History: 33 y.o. female who presents today for a post op visit for sleeve gastrectomy. Patient reports regular bowel movements. She reports using Lovenox injections, as prescribed. She reports taking Tylenol as needed. She denies nausea, vomiting, fever, and chills. Height: 4' 9\" (1.448 m)  Highest Weight:   254 lbs      Current Visit Weight Information  Weight: 234 lb (106.1 kg)   BMI: Body mass index is 50.64 kg/m². Weight loss since surgery:  20 lbs    1 wk - D/C'd home on VTE Prohylaxis? [x] Yes   [] No   On VTE Proph as directed? [x] Yes   [] No    Liver pathology:    [] NA    [] No Gross path    [] Liver Steatosis   [x] Discussed w/ pt   [] Referred to GI    Exercising?    [] Yes    [x] No     Review of Systems:     General  Negative for: [] Weight Change   [x] Fatigue   [x] Fevers & Chills    [] Appetite change [] Other:    Positive for: [x] Weight Change   [] Fatigue   [] Fevers & Chills    [] Appetite change [] Other:   Cardiac  Negative for: [x] Chest Pain   [] Difficulty Breathing   [] Leg Cramps [x] Edema of Lower Extremeties    [] Left   [] Right      Positive for: [] Chest Pain   [] Difficulty Breathing   [] Leg Cramps [] Edema of Lower Extremeties    [] Left   [] Right   Pulmonary  Negative for: [x] Shortness of Breath [] Wheeze [x] Cough  [x] Calf Pain     Positive for: [] Shortness of Breath [] Wheeze [] Cough  [] Calf Pain   Gastro-Intestinal Negative for: [] Heartburn   [] Reflux   [] Dysphagia   [] Melena   [] BRBPR  [x] Vomiting   [x] Abdominal Pain   [] Diarrhea     [] Constipation  [] Other:     Positive for: [] Heartburn   [] Reflux   [] Dysphagia   [] Melena   [] BRBPR  [] Vomiting   [] Abdominal Pain   [] Diarrhea     [] Constipation  [] Other:    Muskuloskeletal Negative for: [] Joint pain   [] Back pain   [] Knee Pain   [] Muscle weakness [x] Hernia   [x] Edema [] Other:     Positive for: [] Joint pain   [] Back pain   [] Knee Pain   [] Muscle weakness [] Hernia   [] Edema [] Other:    Neurologic Negative for: [x] Syncope   [] Insomnia   [x] Being treated for depression  [] Other:     Positive for: [] Syncope   [] Insomnia   [] Being treated for depression  [] Other:    Skin Negative for: [x] Wound:   [] Open   [] Draining   [] Incisional     [x] Rash   [] Hair Loss  [] Other:     Positive for: [] Wound:   [] Open   [] Draining    [] Incisional  [] Rash   [] Hair Loss  [] Other:          Physical Assessment:     /70 (Site: Left Upper Arm, Position: Sitting, Cuff Size: Large Adult)   Pulse 88   Temp (!) 96.1 °F (35.6 °C)   Resp 20   Ht 4' 9\" (1.448 m)   Wt 234 lb (106.1 kg)   BMI 50.64 kg/m²   General Negative for:  [x] Lapses in memory   [x] Unusual Stress   [x] Diffic Concen [] Unable to sleep   [] Eating in response to stress   [] Other:    Positive for:  [] Lapses in memory   [] Unusual Stress   [] Diffic Concen [] Unable to sleep   [] Eating in response to stress   [] Other:   Cardio-Pulmonary   [x] Heart RRR   [x] No murmurs   [] Pulses nl x4 extrem    [x] Good inspiratory effort   [x] No wheezing     [x] Lungs clear to auscultation bilaterally    [] Other:    Gastro-Intestinal   [x] Abd S/NT/ND/Benign   [x] No abdominal mass/hernia    [x] No Splenomegaly    [] Other:    Muskuloskeletal   [x] Good muscle strength x4 extremities   [x] nl gait and ambul    [x] Nl ROM x4 extremities    [] Other:    Neurologic   [x] Alert and oriented x3    [] Other:   Skin   [x] Intact w/ no open wounds   [x] Incisions C/D/I    [] Steri strips removed   [x] No drainage or Infection    [] Other:      Assessment & Plan:      1. Status post laparoscopic sleeve gastrectomy           [x] Advance Diet    [x] Daily protein (65-75gm/day)   [x] Take Vitamins   [x] Attend Support Group    [x] Exercise Regularly   [x] Use contraception    Diet progression discussed  Pathology reviewed with patient  Need for long term compliance and follow up stressed to patient  Dietician consult  Continue taking daily Vitamins  Continue using Lovenox injections, as prescribed  Recommended to start taking Protonix 40 mg, as prescribed  Doing well    Follow up: 1 month    Orders placed this encounter:   No orders of the defined types were placed in this encounter. New Prescriptions:   No orders of the defined types were placed in this encounter. Scribe Attestation:  debbie Wesleyibed on behalf of Alexis Clement DO. Electronically signed by Alexis Clement DO on 12/15/2022 at 11:38 AM    Please note that this chart was generated using voice recognition Dragon dictation software. Although every effort was made to ensure the accuracy of this automated transcription, some errors in transcription may have occurred. Maurisio Morley DO DO, personally performed the services described in this documentation. All medical record entries made by the scribe were at my direction and in my presence. I have reviewed the chart and discharge instructions (if applicable) and agree that the record reflects my personal performance and is accurate and complete.     Electronically Signed: Alexis Clement DO. 12/21/22. 10:33 AM.

## 2022-12-22 ENCOUNTER — TELEPHONE (OUTPATIENT)
Dept: BARIATRICS/WEIGHT MGMT | Age: 27
End: 2022-12-22

## 2022-12-22 PROBLEM — E66.01 MORBID OBESITY (HCC): Status: ACTIVE | Noted: 2022-12-22

## 2022-12-22 RX ORDER — ONDANSETRON 4 MG/1
4 TABLET, ORALLY DISINTEGRATING ORAL 3 TIMES DAILY PRN
Qty: 21 TABLET | Refills: 0 | Status: SHIPPED | OUTPATIENT
Start: 2022-12-22

## 2022-12-27 ENCOUNTER — TELEPHONE (OUTPATIENT)
Dept: BARIATRICS/WEIGHT MGMT | Age: 27
End: 2022-12-27

## 2022-12-27 NOTE — TELEPHONE ENCOUNTER
Fluids ordered - patient wants to go on 12/28/22    Robb Deutsch 12/28/22 @ 10:00 am    Patient notified.

## 2022-12-27 NOTE — TELEPHONE ENCOUNTER
Patient called requesting IV fluids    Next Visit Date:  1/12/2023    Patient Surgery Date  12/5/22    Type of  Surgery  gastric sleeve    Patient calls complaining of  feels behind on fluids,     Onset: 6 day(s) ago  Timing: constant, intermittent  Severity: Moderate    Progression: increased    Associated Symptoms: feels like she is behind on fluids. Patient is requesting IV hydration      Any allergy  To medications     no    Current  Pharmacy   [facility]    Patient advised:   If  Symptoms worsen seek treatment at  Emergency Room. You will receive a call back from the office within 24-48 hours.     Is it ok to leave a message if we call back yes

## 2022-12-28 ENCOUNTER — HOSPITAL ENCOUNTER (OUTPATIENT)
Dept: ONCOLOGY | Age: 27
Discharge: HOME | End: 2022-12-28
Attending: SURGERY | Admitting: SURGERY
Payer: COMMERCIAL

## 2022-12-28 VITALS
RESPIRATION RATE: 18 BRPM | DIASTOLIC BLOOD PRESSURE: 107 MMHG | HEART RATE: 92 BPM | SYSTOLIC BLOOD PRESSURE: 149 MMHG | TEMPERATURE: 98.1 F | OXYGEN SATURATION: 97 %

## 2022-12-28 PROCEDURE — 2580000003 HC RX 258: Performed by: SURGERY

## 2022-12-28 PROCEDURE — 96366 THER/PROPH/DIAG IV INF ADDON: CPT

## 2022-12-28 PROCEDURE — 96365 THER/PROPH/DIAG IV INF INIT: CPT

## 2022-12-28 RX ORDER — SODIUM CHLORIDE 9 MG/ML
INJECTION, SOLUTION INTRAVENOUS ONCE
Status: COMPLETED | OUTPATIENT
Start: 2022-12-28 | End: 2022-12-28

## 2022-12-28 RX ADMIN — SODIUM CHLORIDE: 9 INJECTION, SOLUTION INTRAVENOUS at 11:16

## 2022-12-28 RX ADMIN — SODIUM CHLORIDE: 9 INJECTION, SOLUTION INTRAVENOUS at 10:11

## 2022-12-28 NOTE — PLAN OF CARE
1007 Patient arrived for infusion. IV placed without complication. VSS as charted. Patient tolerated infusion well. Patient declined liquid tray. Patient left infusion center with all belongings and steady gate.  [address]

## 2022-12-29 NOTE — DISCHARGE SUMMARY
Surgery Discharge Summary      Patient Identification  Luis E Hernadez is a 33 y.o. female. :  1995  Admit Date:  2022     Discharge date:   2022  3:44 PM                                   Disposition: home     Discharge Diagnoses:       Patient Active Problem List   Diagnosis    PCO (polycystic ovaries)    Morbid obesity with BMI of 50.0-59.9, adult ([address]    GERD without esophagitis    HTN (hypertension)    Fatigue    Generalized anxiety disorder    Lumbar radiculopathy    Migraine with aura    Vitamin D deficiency    S/P laparoscopic sleeve gastrectomy         Condition on discharge: Good     Consults: None     Surgery: Robotic gastric sleeve     Patient Instructions: Activity: no heavy lifting, pushing, pulling for 6 weeks, no driving for 2 weeks or while on analgesics  Diet: As tolerated  Follow-up with Dr. Gwyn Pop in 2 weeks. See pre-printed instructions in chart and given to patient upon discharge. Discharge Medications:          Medication List          START taking these medications       cyclobenzaprine 10 MG tablet  Commonly known as: FLEXERIL  Take 1 tablet by mouth 3 times daily as needed for Muscle spasms      enoxaparin 40 MG/0.4ML  Commonly known as: Lovenox  Inject 0.4 mLs into the skin 2 times daily for 14 days      oxyCODONE-acetaminophen 5-325 MG per tablet  Commonly known as: PERCOCET  Take 1 tablet by mouth every 6 hours as needed for Pain for up to 7 days.       pantoprazole 40 MG tablet  Commonly known as: PROTONIX  Take 1 tablet by mouth daily      promethazine 25 MG tablet  Commonly known as: PHENERGAN  Take 1 tablet by mouth every 6 hours as needed for Nausea                CONTINUE taking these medications       acetaminophen 500 MG tablet  Commonly known as: TYLENOL                STOP taking these medications       MULTIVITAMIN ADULT PO                ASK your doctor about these medications       etonogestrel-ethinyl estradiol 0.12-0.015 MG/24HR vaginal ring  Commonly known as: NUVARING                    Where to Get Your Medications          These medications were sent to [facility] [id number], [address] [facility] Chris Hernandez [phone] [address], [address] [facility] [address]        Phone: [phone]   enoxaparin 40 MG/0.4ML         These medications were sent to [facility] [address] [facility], [address] [place] [address]  [address], [address]  [address]        Phone: [phone]   cyclobenzaprine 10 MG tablet  oxyCODONE-acetaminophen 5-325 MG per tablet  pantoprazole 40 MG tablet  promethazine 25 MG tablet            HPI and Hospital Course:   33 y.o. female presented on 12/5/2022 for an elective robotic laparoscopic gastric sleeve and EGD. Patient underwent the procedure well without complication. [facility] Hospital course was unremarkable. On day of discharge pt was tolerating a bariatric diet, pain controlled with oral medications and ambulating without difficulty.         Electronically signed by Rebecka Scheuermann, DO on 12/8/2022 at 4:29 PM

## 2023-01-04 ENCOUNTER — OFFICE VISIT (OUTPATIENT)
Dept: BARIATRICS/WEIGHT MGMT | Age: 28
End: 2023-01-04

## 2023-01-04 VITALS
BODY MASS INDEX: 48.69 KG/M2 | SYSTOLIC BLOOD PRESSURE: 118 MMHG | TEMPERATURE: 98.6 F | WEIGHT: 225 LBS | DIASTOLIC BLOOD PRESSURE: 72 MMHG

## 2023-01-04 DIAGNOSIS — E28.2 PCO (POLYCYSTIC OVARIES): Primary | ICD-10-CM

## 2023-01-04 DIAGNOSIS — M54.16 LUMBAR RADICULOPATHY: ICD-10-CM

## 2023-01-04 DIAGNOSIS — Z98.84 S/P LAPAROSCOPIC SLEEVE GASTRECTOMY: ICD-10-CM

## 2023-01-04 DIAGNOSIS — E66.01 MORBID OBESITY (HCC): ICD-10-CM

## 2023-01-04 PROCEDURE — 99024 POSTOP FOLLOW-UP VISIT: CPT | Performed by: NURSE PRACTITIONER

## 2023-01-04 NOTE — PROGRESS NOTES
Medical Nutrition Therapy  Metabolic and Bariatric surgery  1 month after surgery follow up note         Pt reports: She is doing well tolerating protein foods, she notes some nausea but has medication if needed. Nausea sometimes hinders ability to get in protein. She is getting 24-48 oz of fluid daily and is continuing to try to get in necessary fluid. Taking supplements. Vitals:   Vitals:    01/04/23 1319   BP: 118/72   Site: Left Upper Arm   Position: Sitting   Temp: 98.6 °F (37 °C)   Weight: 225 lb (102.1 kg)      Body mass index is 48.69 kg/m². Labs reviewed:     Multivitamin/mineral intake: women's mvi with iron  Calcium intake: Daily   Other:            Nutrition Assessment:   PES: Inadequate food and beverage intake r/t WLS as evidenced by loss of excess body weight lost 29 lbs over 1 mo. Goals   60-80gm of protein  48-64oz of fluid     [x] met     []  Not met        Plan: Increase fluid intake to 48-64 oz/day. Start bariatric multivitamin to meet needs and take calcium supplement 2 hrs after. Aim to eat one protein source every 3-5 hours to meet needs.  Questions answered re diet advancement and tolerance  F/u 3 months after surgery         Terrell Rodriguez, MS, RD, LD

## 2023-01-04 NOTE — PROGRESS NOTES
Post-op Bariatric Surgery Note    Subjective     Patient is 1 month s/p laparoscopic sleeve gastrectomy, down 29 lbs. Overall, doing well. Incisions well healed. Consistent use of MVI and calcium. Tolerating po intake. Working on fluid intake. Bowel function normal.  Physical activity includes walking. No pain or other specific problems or concerns. Allergies:  No Known Allergies    Past Medical History:     Past Medical History:   Diagnosis Date    ADHD (attention deficit hyperactivity disorder) 2000    I was on medication when i was younger but as i got older it my adhd wasnt as bad but I still sometimes have moments on not being able to focus l. Headache 7/1/22    From muscle tightness and stiffness in my neck and shoulders    HTN (hypertension)     does not take meds    Obesity     PCOS (polycystic ovarian syndrome)     Dr. Angy De La O examination     [address] [facility] last visit 11/2022. appt 11/22/2022 for medical clearance   .     Past Surgical History:  Past Surgical History:   Procedure Laterality Date    SLEEVE GASTRECTOMY N/A 12/05/2022    ROBOTIC LAPAROSCOPIC GASTRIC SLEEVE, EGD    SLEEVE GASTRECTOMY N/A 12/5/2022    XI ROBOTIC LAPAROSCOPIC GASTRIC SLEEVE, [facility] [address]    EGD BIOPSY performed by Ivania Kendrick DO at [address] [facility].       Family History:  Family History   Problem Relation Age of Onset    Cancer Mother         uterine or ovarian CA    Migraines Mother     High Blood Pressure Mother     Obesity Mother     High Blood Pressure Father     Obesity Father     Cancer Maternal Grandmother     Diabetes Maternal Grandmother     Cancer Paternal Grandmother     Cancer Maternal Aunt        Social History:  Social History     Socioeconomic History    Marital status:      Spouse name: Not on file    Number of children: Not on file    Years of education: Not on file    Highest education level: Not on file   Occupational History    Not on file   Tobacco Use    Smoking status: Never     Passive exposure: Current ( - outside)    Smokeless tobacco: Never   Vaping Use    Vaping Use: Never used   Substance and Sexual Activity    Alcohol use: Not Currently     Comment: rare    Drug use: Never    Sexual activity: Yes     Partners: Male   Other Topics Concern    Not on file   Social History Narrative    Not on file     Social Determinants of Health     Financial Resource Strain: Low Risk     Difficulty of Paying Living Expenses: Not hard at all   Food Insecurity: No Food Insecurity    Worried About Running Out of Food in the Last Year: Never true    [address] in the Last Year: Never true   Transportation Needs: No Transportation Needs    Lack of Transportation (Medical): No    Lack of Transportation (Non-Medical): No   Physical Activity: Not on file   Stress: Not on file   Social Connections: Not on file   Intimate Partner Violence: Not on file   Housing Stability: Unknown    Unable to Pay for Housing in the Last Year: No    Number of Places Lived in the Last Year: Not on file    Unstable Housing in the Last Year: No       Current Medications:  Current Outpatient Medications   Medication Sig Dispense Refill    ondansetron (ZOFRAN-ODT) 4 MG disintegrating tablet Take 1 tablet by mouth 3 times daily as needed for Nausea or Vomiting 21 tablet 0    pantoprazole (PROTONIX) 40 MG tablet Take 1 tablet by mouth daily 30 tablet 3    etonogestrel-ethinyl estradiol (NUVARING) 0.12-0.015 MG/24HR vaginal ring Place 1 each vaginally See Admin Instructions      acetaminophen (TYLENOL) 500 MG tablet Take 1,000 mg by mouth every 6 hours as needed for Pain       No current facility-administered medications for this visit.        Vital Signs:  /72 (Site: Left Upper Arm, Position: Sitting)   Temp 98.6 °F (37 °C)   Wt 225 lb (102.1 kg)   BMI 48.69 kg/m² BMI/Height/Weight:  Body mass index is 48.69 kg/m². Review of Systems - A review of systems was performed. All was negative unless otherwise documented in HPI. Constitutional: Negative for fever, chills. HENT: Negative for trouble swallowing. Eyes: Negative for visual disturbance. Respiratory: Negative for cough, shortness of breath. Cardiovascular: Negative for chest pain and palpitations. Gastrointestinal: Negative for nausea, vomiting, abdominal pain, diarrhea, constipation, blood in stool and abdominal distention. Endocrine: Negative for polydipsia, polyphagia and polyuria. Genitourinary: Negative for dysuria, frequency, hematuria and difficulty urinating. Musculoskeletal: Negative for myalgias, joint swelling. Skin: Negative for pallor and rash. Neurological: Negative for dizziness, tremors, light-headedness and headaches. Psychiatric/Behavioral: Negative for sleep disturbance and dysphoric mood. Objective:      Physical Exam   Vital signs reviewed. General: Well-developed and well-nourished. No acute distress. Skin: Warm, dry and intact. HEENT: Normocephalic. EOMs intact. Conjunctivae normal. Neck supple. Cardiovascular: Normal rate, regular rhythm. Pulmonary/Chest: Normal effort. Lungs clear to auscultation. No rales, rhonchi or wheezing. Abdominal: Positive bowel sounds. Soft, nontender. Nondistended. No rigidity, rebound, or guarding. Musculoskeletal: Movement x4. No edema. Neurological: Gait normal. Alert and oriented to person, place, and time. Psychiatric: Normal mood and affect. Speech and behavior normal. Judgment and thought content normal. Cognition and memory intact. Assessment:       Diagnosis Orders   1.  PCO (polycystic ovaries)  Lipid Panel    Comprehensive Metabolic Panel    Ferritin    Iron and TIBC    TSH with Reflex    Hemoglobin A1C    CBC with Auto Differential    Vitamin B1    Vitamin B12 & Folate    Vitamin D 25 Hydroxy Vitamin A    Zinc    PTH, Intact      2. S/P laparoscopic sleeve gastrectomy  Lipid Panel    Comprehensive Metabolic Panel    Ferritin    Iron and TIBC    TSH with Reflex    Hemoglobin A1C    CBC with Auto Differential    Vitamin B1    Vitamin B12 & Folate    Vitamin D 25 Hydroxy    Vitamin A    Zinc    PTH, Intact      3. Lumbar radiculopathy  Lipid Panel    Comprehensive Metabolic Panel    Ferritin    Iron and TIBC    TSH with Reflex    Hemoglobin A1C    CBC with Auto Differential    Vitamin B1    Vitamin B12 & Folate    Vitamin D 25 Hydroxy    Vitamin A    Zinc    PTH, Intact      4. Morbid obesity (HCC)  Lipid Panel    Comprehensive Metabolic Panel    Ferritin    Iron and TIBC    TSH with Reflex    Hemoglobin A1C    CBC with Auto Differential    Vitamin B1    Vitamin B12 & Folate    Vitamin D 25 Hydroxy    Vitamin A    Zinc    PTH, Intact          Plan:    Dietitian visit today. Patient to continue to increase protein to obtain 60-80g/day, at least 48-64oz of fluid daily, and gradually increase exercise regimen. Bariatric post-op labs ordered. Follow-up  Return in about 2 months (around 3/4/2023). Orders this encounter:  Orders Placed This Encounter   Procedures    Lipid Panel     Standing Status:   Future     Standing Expiration Date:   1/23/2024     Order Specific Question:   Is Patient Fasting?/# of Hours     Answer:   12    Comprehensive Metabolic Panel     Standing Status:   Future     Standing Expiration Date:   1/23/2024    Ferritin     Standing Status:   Future     Standing Expiration Date:   1/23/2024    Iron and TIBC     Standing Status:   Future     Standing Expiration Date:   1/23/2024     Order Specific Question:   Is Patient Fasting? Answer:   no     Order Specific Question:   No of Hours?      Answer:   0    TSH with Reflex     Standing Status:   Future     Standing Expiration Date:   1/23/2024    Hemoglobin A1C     Standing Status:   Future     Standing Expiration Date:   1/23/2024 CBC with Auto Differential     Standing Status:   Future     Standing Expiration Date:   1/23/2024    Vitamin B1     Standing Status:   Future     Standing Expiration Date:   1/23/2024    Vitamin B12 & Folate     Standing Status:   Future     Standing Expiration Date:   1/23/2024    Vitamin D 25 Hydroxy     Standing Status:   Future     Standing Expiration Date:   1/23/2024    Vitamin A     Standing Status:   Future     Standing Expiration Date:   1/23/2024    Zinc     Standing Status:   Future     Standing Expiration Date:   1/23/2024    PTH, Intact     Standing Status:   Future     Standing Expiration Date:   1/23/2024       Prescriptions this encounter:  No orders of the defined types were placed in this encounter.       Electronically signed by:  Carmen Linder CNP

## 2023-01-08 PROBLEM — E86.0 DEHYDRATION: Status: RESOLVED | Noted: 2022-12-09 | Resolved: 2023-01-08

## 2023-01-18 ENCOUNTER — TELEPHONE (OUTPATIENT)
Dept: BARIATRICS/WEIGHT MGMT | Age: 28
End: 2023-01-18

## 2023-01-18 RX ORDER — PANTOPRAZOLE SODIUM 40 MG/1
40 TABLET, DELAYED RELEASE ORAL DAILY
Qty: 30 TABLET | Refills: 3 | Status: SHIPPED | OUTPATIENT
Start: 2023-01-18

## 2023-01-18 RX ORDER — ONDANSETRON 4 MG/1
4 TABLET, FILM COATED ORAL EVERY 8 HOURS PRN
Qty: 30 TABLET | Refills: 2 | Status: SHIPPED | OUTPATIENT
Start: 2023-01-18

## 2023-01-30 ENCOUNTER — HOSPITAL ENCOUNTER (OUTPATIENT)
Age: 28
Setting detail: SPECIMEN
Discharge: HOME | End: 2023-01-30

## 2023-01-30 DIAGNOSIS — E66.01 MORBID OBESITY (HCC): ICD-10-CM

## 2023-01-30 DIAGNOSIS — Z98.84 S/P LAPAROSCOPIC SLEEVE GASTRECTOMY: ICD-10-CM

## 2023-01-30 DIAGNOSIS — M54.16 LUMBAR RADICULOPATHY: ICD-10-CM

## 2023-01-30 DIAGNOSIS — E28.2 PCO (POLYCYSTIC OVARIES): ICD-10-CM

## 2023-01-30 DIAGNOSIS — E55.9 VITAMIN D DEFICIENCY: Primary | ICD-10-CM

## 2023-01-30 LAB
ABSOLUTE EOS #: 0.05 K/UL (ref 0–0.44)
ABSOLUTE IMMATURE GRANULOCYTE: <0.03 K/UL (ref 0–0.3)
ABSOLUTE LYMPH #: 1.06 K/UL (ref 1.1–3.7)
ABSOLUTE MONO #: 0.42 K/UL (ref 0.1–1.2)
ALBUMIN SERPL-MCNC: 4.2 G/DL (ref 3.5–5.2)
ALBUMIN/GLOBULIN RATIO: 1.6 (ref 1–2.5)
ALP BLD-CCNC: 110 U/L (ref 35–104)
ALT SERPL-CCNC: 21 U/L (ref 5–33)
ANION GAP SERPL CALCULATED.3IONS-SCNC: 21 MMOL/L (ref 9–17)
AST SERPL-CCNC: 23 U/L
BASOPHILS # BLD: 1 % (ref 0–2)
BASOPHILS ABSOLUTE: 0.03 K/UL (ref 0–0.2)
BILIRUB SERPL-MCNC: 0.8 MG/DL (ref 0.3–1.2)
BUN BLDV-MCNC: 11 MG/DL (ref 6–20)
CALCIUM SERPL-MCNC: 9.4 MG/DL (ref 8.6–10.4)
CHLORIDE BLD-SCNC: 101 MMOL/L (ref 98–107)
CHOLESTEROL/HDL RATIO: 3.8
CHOLESTEROL: 148 MG/DL
CO2: 18 MMOL/L (ref 20–31)
CREAT SERPL-MCNC: 0.43 MG/DL (ref 0.5–0.9)
EOSINOPHILS RELATIVE PERCENT: 1 % (ref 1–4)
ESTIMATED AVERAGE GLUCOSE: 80 MG/DL
FERRITIN: 138 NG/ML (ref 13–150)
FOLATE: 9.8 NG/ML
GFR SERPL CREATININE-BSD FRML MDRD: >60 ML/MIN/1.73M2
GLUCOSE BLD-MCNC: 71 MG/DL (ref 70–99)
HBA1C MFR BLD: 4.4 % (ref 4–6)
HCT VFR BLD CALC: 41.7 % (ref 36.3–47.1)
HDLC SERPL-MCNC: 39 MG/DL
HEMOGLOBIN: 13.7 G/DL (ref 11.9–15.1)
IMMATURE GRANULOCYTES: 0 %
IRON SATURATION: 18 % (ref 20–55)
IRON: 57 UG/DL (ref 37–145)
LDL CHOLESTEROL: 95 MG/DL (ref 0–130)
LYMPHOCYTES # BLD: 16 % (ref 24–43)
MCH RBC QN AUTO: 28.8 PG (ref 25.2–33.5)
MCHC RBC AUTO-ENTMCNC: 32.9 G/DL (ref 28.4–34.8)
MCV RBC AUTO: 87.8 FL (ref 82.6–102.9)
MONOCYTES # BLD: 6 % (ref 3–12)
NRBC AUTOMATED: 0 PER 100 WBC
PDW BLD-RTO: 15.2 % (ref 11.8–14.4)
PLATELET # BLD: 306 K/UL (ref 138–453)
PMV BLD AUTO: 10.8 FL (ref 8.1–13.5)
POTASSIUM SERPL-SCNC: 4 MMOL/L (ref 3.7–5.3)
PTH INTACT: 34.7 PG/ML (ref 14–72)
RBC # BLD: 4.75 M/UL (ref 3.95–5.11)
RBC # BLD: ABNORMAL 10*6/UL
SEG NEUTROPHILS: 76 % (ref 36–65)
SEGMENTED NEUTROPHILS ABSOLUTE COUNT: 5.01 K/UL (ref 1.5–8.1)
SODIUM BLD-SCNC: 140 MMOL/L (ref 135–144)
TOTAL IRON BINDING CAPACITY: 311 UG/DL (ref 250–450)
TOTAL PROTEIN: 6.9 G/DL (ref 6.4–8.3)
TRIGL SERPL-MCNC: 69 MG/DL
TSH SERPL DL<=0.05 MIU/L-ACNC: 3.26 UIU/ML (ref 0.3–5)
UNSATURATED IRON BINDING CAPACITY: 254 UG/DL (ref 112–347)
VITAMIN B-12: 598 PG/ML (ref 232–1245)
VITAMIN D 25-HYDROXY: 28.8 NG/ML
WBC # BLD: 6.6 K/UL (ref 3.5–11.3)

## 2023-01-30 RX ORDER — ERGOCALCIFEROL 1.25 MG/1
50000 CAPSULE ORAL WEEKLY
Qty: 8 CAPSULE | Refills: 0 | Status: SHIPPED | OUTPATIENT
Start: 2023-01-30 | End: 2023-03-21

## 2023-01-31 LAB — ZINC: 104.2 UG/DL (ref 60–120)

## 2023-02-01 LAB
RETINYL PALMITATE: <0.02 MG/L (ref 0–0.1)
VITAMIN A LEVEL: 0.32 MG/L (ref 0.3–1.2)
VITAMIN A, INTERP: NORMAL

## 2023-02-03 LAB — VIT B1 PYROPHOSHATE BLD-SCNC: 59 NMOL/L (ref 70–180)

## 2023-02-10 ENCOUNTER — NURSE ONLY (OUTPATIENT)
Dept: BARIATRICS/WEIGHT MGMT | Age: 28
End: 2023-02-10

## 2023-02-10 VITALS — WEIGHT: 215 LBS | BODY MASS INDEX: 46.53 KG/M2

## 2023-02-10 NOTE — PROGRESS NOTES
Medical Nutrition Therapy  Metabolic and Bariatric surgery  2 months post-op     Pt reports: She has been worried about her overall intake and if she is getting in the right things. Diet recall shows she is eating 60- 70 g of protein and then focusing on veggies next. She states she thinks she gets 50-60 oz of fluid. She denies vomiting. She is eating small frequent meals with protein at each meal. She is checking weight daily at home. She states she is concerned about stalling weight loss or hitting a plateau only 2 months out. Vitals:   Vitals:    02/10/23 1430   Weight: 215 lb (97.5 kg)      Body mass index is 46.53 kg/m². Labs reviewed: Last drawn 1/30/23  - Vit D: LOW  - Vitamin B1: LOW  - HDL: LOW    Multivitamin/mineral intake: generic MVI without iron  Calcium intake: none    Nutrition Assessment:   PES: Inadequate food and beverage intake r/t WLS as evidenced by loss of excess body weight lost 39 lbs over 2 months. Goals   60-80gm of protein  48-64oz of fluid     [x] met     []  Not met    Nutrition Eduction Provided: Emotional Eating Handout, Food Relationship Handout, Protein Sources List, and Food Label Review    Plan: If unable to get in protein, set timer for every 3-5 hours to remember to eat and always choose protein first. Pt will find activities that bring pleasure when feeling drawn to eat when not hungry. Increase activity as able, aiming for 30 minutes a day 5 times a week. Aim to only get on the scale 1 time per week and continue to follow-up with therapist to focus on food relationship. Take one bariatric MVI with at least 18 mg of iron daily. Take 1000 mg of Ca daily. F/u in 1 month.      Lei Wilde, MS, RD, LD

## 2023-02-22 ENCOUNTER — TELEPHONE (OUTPATIENT)
Dept: BARIATRICS/WEIGHT MGMT | Age: 28
End: 2023-02-22

## 2023-02-22 NOTE — TELEPHONE ENCOUNTER
Per Dr Moe Russell, set up for fluids and a banana bag, vit b 12 injection also, set up at the infusion center 2-25-23 at 10 am and patient notified

## 2023-02-25 ENCOUNTER — HOSPITAL ENCOUNTER (OUTPATIENT)
Dept: ONCOLOGY | Age: 28
Discharge: HOME | End: 2023-02-25
Attending: SURGERY | Admitting: SURGERY
Payer: COMMERCIAL

## 2023-02-25 VITALS
HEART RATE: 79 BPM | DIASTOLIC BLOOD PRESSURE: 84 MMHG | SYSTOLIC BLOOD PRESSURE: 140 MMHG | RESPIRATION RATE: 18 BRPM | OXYGEN SATURATION: 97 % | TEMPERATURE: 97.3 F

## 2023-02-25 PROBLEM — E86.0 DEHYDRATION: Status: ACTIVE | Noted: 2023-02-25

## 2023-02-25 PROCEDURE — 96360 HYDRATION IV INFUSION INIT: CPT

## 2023-02-25 PROCEDURE — 96361 HYDRATE IV INFUSION ADD-ON: CPT

## 2023-02-25 PROCEDURE — 6360000002 HC RX W HCPCS: Performed by: SURGERY

## 2023-02-25 PROCEDURE — 96372 THER/PROPH/DIAG INJ SC/IM: CPT

## 2023-02-25 PROCEDURE — 2500000003 HC RX 250 WO HCPCS: Performed by: SURGERY

## 2023-02-25 PROCEDURE — 96365 THER/PROPH/DIAG IV INF INIT: CPT

## 2023-02-25 PROCEDURE — 2580000003 HC RX 258: Performed by: SURGERY

## 2023-02-25 PROCEDURE — 96366 THER/PROPH/DIAG IV INF ADDON: CPT

## 2023-02-25 RX ORDER — CYANOCOBALAMIN 1000 UG/ML
1000 INJECTION, SOLUTION INTRAMUSCULAR; SUBCUTANEOUS ONCE
Status: COMPLETED | OUTPATIENT
Start: 2023-02-25 | End: 2023-02-25

## 2023-02-25 RX ORDER — SODIUM CHLORIDE 9 MG/ML
INJECTION, SOLUTION INTRAVENOUS CONTINUOUS
Status: DISCONTINUED | OUTPATIENT
Start: 2023-02-25 | End: 2023-02-25 | Stop reason: HOSPADM

## 2023-02-25 RX ADMIN — FOLIC ACID: 5 INJECTION, SOLUTION INTRAMUSCULAR; INTRAVENOUS; SUBCUTANEOUS at 10:38

## 2023-02-25 RX ADMIN — CYANOCOBALAMIN 1000 MCG: 1000 INJECTION, SOLUTION INTRAMUSCULAR at 12:50

## 2023-02-25 RX ADMIN — SODIUM CHLORIDE: 9 INJECTION, SOLUTION INTRAVENOUS at 10:24

## 2023-03-06 ENCOUNTER — OFFICE VISIT (OUTPATIENT)
Dept: BARIATRICS/WEIGHT MGMT | Age: 28
End: 2023-03-06
Payer: COMMERCIAL

## 2023-03-06 VITALS
WEIGHT: 206 LBS | BODY MASS INDEX: 44.44 KG/M2 | SYSTOLIC BLOOD PRESSURE: 110 MMHG | DIASTOLIC BLOOD PRESSURE: 80 MMHG | HEIGHT: 57 IN | HEART RATE: 70 BPM

## 2023-03-06 DIAGNOSIS — Z98.84 S/P LAPAROSCOPIC SLEEVE GASTRECTOMY: ICD-10-CM

## 2023-03-06 DIAGNOSIS — E28.2 PCO (POLYCYSTIC OVARIES): Primary | ICD-10-CM

## 2023-03-06 DIAGNOSIS — K21.9 GERD WITHOUT ESOPHAGITIS: ICD-10-CM

## 2023-03-06 DIAGNOSIS — E66.01 MORBID OBESITY (HCC): ICD-10-CM

## 2023-03-06 PROCEDURE — 99213 OFFICE O/P EST LOW 20 MIN: CPT | Performed by: NURSE PRACTITIONER

## 2023-03-06 NOTE — PROGRESS NOTES
Post-op Bariatric Surgery Note    Subjective     Patient is 3 months s/p laparoscopic sleeve gastrectomy, down 48 lbs. Overall, doing well. Incisions well healed. Consistent use of MVI and calcium. Tolerating po intake. Working on fluid intake. Bowel function normal.  Physical activity includes walking and steps. GERD controlled with protonix prn. Sometimes has discomfort after eating, likely due to eating too fast.  No pain or other specific problems or concerns. Allergies:  No Known Allergies    Past Medical History:     Past Medical History:   Diagnosis Date    ADHD (attention deficit hyperactivity disorder) 2000    I was on medication when i was younger but as i got older it my adhd wasnt as bad but I still sometimes have moments on not being able to focus l. Headache 7/1/22    From muscle tightness and stiffness in my neck and shoulders    HTN (hypertension)     does not take meds    Obesity     PCOS (polycystic ovarian syndrome)     Dr. Pati Moreau examination     [facility] [address] [facility] [facility] last visit 11/2022. appt 11/22/2022 for medical clearance   .     Past Surgical History:  Past Surgical History:   Procedure Laterality Date    SLEEVE GASTRECTOMY N/A 12/05/2022    ROBOTIC LAPAROSCOPIC GASTRIC SLEEVE, EGD    SLEEVE GASTRECTOMY N/A 12/5/2022    XI ROBOTIC LAPAROSCOPIC GASTRIC SLEEVE, EGD performed by Jarod Farah DO at [address] N/A 03/04/2022    EGD BIOPSY performed by Jarod Farah DO at [id number] [address].       Family History:  Family History   Problem Relation Age of Onset    Cancer Mother         uterine or ovarian CA    Migraines Mother     High Blood Pressure Mother     Obesity Mother     High Blood Pressure Father     Obesity Father     Cancer Maternal Grandmother     Diabetes Maternal Grandmother     Cancer Paternal Grandmother     Cancer Maternal Aunt        Social History:  Social History Socioeconomic History    Marital status:      Spouse name: Not on file    Number of children: Not on file    Years of education: Not on file    Highest education level: Not on file   Occupational History    Not on file   Tobacco Use    Smoking status: Never     Passive exposure: Current ( - outside)    Smokeless tobacco: Never   Vaping Use    Vaping Use: Never used   Substance and Sexual Activity    Alcohol use: Not Currently     Comment: rare    Drug use: Never    Sexual activity: Yes     Partners: Male   Other Topics Concern    Not on file   Social History Narrative    Not on file     Social Determinants of Health     Financial Resource Strain: Low Risk     Difficulty of Paying Living Expenses: Not hard at all   Food Insecurity: No Food Insecurity    Worried About Running Out of Food in the Last Year: Never true    [address] in the Last Year: Never true   Transportation Needs: No Transportation Needs    Lack of Transportation (Medical): No    Lack of Transportation (Non-Medical):  No   Physical Activity: Not on file   Stress: Not on file   Social Connections: Not on file   Intimate Partner Violence: Not on file   Housing Stability: Unknown    Unable to Pay for Housing in the Last Year: No    Number of Places Lived in the Last Year: Not on file    Unstable Housing in the Last Year: No       Current Medications:  Current Outpatient Medications   Medication Sig Dispense Refill    vitamin D (ERGOCALCIFEROL) 1.25 MG (07311 UT) CAPS capsule Take 1 capsule by mouth once a week for 8 doses 8 capsule 0    ondansetron (ZOFRAN) 4 MG tablet Take 1 tablet by mouth every 8 hours as needed for Nausea or Vomiting 30 tablet 2    pantoprazole (PROTONIX) 40 MG tablet Take 1 tablet by mouth daily 30 tablet 3    ondansetron (ZOFRAN-ODT) 4 MG disintegrating tablet Take 1 tablet by mouth 3 times daily as needed for Nausea or Vomiting 21 tablet 0    etonogestrel-ethinyl estradiol (NUVARING) 0.12-0.015 MG/24HR vaginal ring Place 1 each vaginally See Admin Instructions      acetaminophen (TYLENOL) 500 MG tablet Take 1,000 mg by mouth every 6 hours as needed for Pain       No current facility-administered medications for this visit. Vital Signs:  /80 (Site: Right Upper Arm, Position: Sitting, Cuff Size: Large Adult)   Pulse 70   Ht 4' 9\" (1.448 m)   Wt 206 lb (93.4 kg)   BMI 44.58 kg/m²     BMI/Height/Weight:  Body mass index is 44.58 kg/m². Review of Systems - A review of systems was performed. All was negative unless otherwise documented in HPI. Constitutional: Negative for fever, chills. HENT: Negative for trouble swallowing. Eyes: Negative for visual disturbance. Respiratory: Negative for cough, shortness of breath. Cardiovascular: Negative for chest pain and palpitations. Gastrointestinal: Negative for nausea, vomiting, abdominal pain, diarrhea, constipation, blood in stool and abdominal distention. Endocrine: Negative for polydipsia, polyphagia and polyuria. Genitourinary: Negative for dysuria, frequency, hematuria and difficulty urinating. Musculoskeletal: Negative for myalgias, joint swelling. Skin: Negative for pallor and rash. Neurological: Negative for dizziness, tremors, light-headedness and headaches. Psychiatric/Behavioral: Negative for sleep disturbance and dysphoric mood. Objective:      Physical Exam   Vital signs reviewed. General: Well-developed and well-nourished. No acute distress. Skin: Warm, dry and intact. HEENT: Normocephalic. EOMs intact. Conjunctivae normal. Neck supple. Cardiovascular: Normal rate, regular rhythm. Pulmonary/Chest: Normal effort. Lungs clear to auscultation. No rales, rhonchi or wheezing. Abdominal: Positive bowel sounds. Soft, nontender. Nondistended. No rigidity, rebound, or guarding. Musculoskeletal: Movement x4. No edema. Neurological: Gait normal. Alert and oriented to person, place, and time.    Psychiatric: Normal mood and affect. Speech and behavior normal. Judgment and thought content normal. Cognition and memory intact. Assessment:       Diagnosis Orders   1. PCO (polycystic ovaries)  CBC with Auto Differential    Comprehensive Metabolic Panel    Ferritin    Hemoglobin A1C    Iron and TIBC    Lipid Panel    Magnesium    PTH, Intact    TSH    Vitamin A    Vitamin B1    Vitamin B12 & Folate    Vitamin D 25 Hydroxy    Zinc      2. S/P laparoscopic sleeve gastrectomy  CBC with Auto Differential    Comprehensive Metabolic Panel    Ferritin    Hemoglobin A1C    Iron and TIBC    Lipid Panel    Magnesium    PTH, Intact    TSH    Vitamin A    Vitamin B1    Vitamin B12 & Folate    Vitamin D 25 Hydroxy    Zinc      3. Morbid obesity (HCC)  CBC with Auto Differential    Comprehensive Metabolic Panel    Ferritin    Hemoglobin A1C    Iron and TIBC    Lipid Panel    Magnesium    PTH, Intact    TSH    Vitamin A    Vitamin B1    Vitamin B12 & Folate    Vitamin D 25 Hydroxy    Zinc      4. GERD without esophagitis  CBC with Auto Differential    Comprehensive Metabolic Panel    Ferritin    Hemoglobin A1C    Iron and TIBC    Lipid Panel    Magnesium    PTH, Intact    TSH    Vitamin A    Vitamin B1    Vitamin B12 & Folate    Vitamin D 25 Hydroxy    Zinc          Plan:    Dietitian visit today. Patient to continue to increase protein to obtain 60-80g/day, at least 48-64oz of fluid daily, and gradually increase exercise regimen. Bariatric post-op labs ordered. Follow-up  Return in about 3 months (around 6/6/2023).     Orders this encounter:  Orders Placed This Encounter   Procedures    CBC with Auto Differential     Standing Status:   Future     Standing Expiration Date:   3/6/2024    Comprehensive Metabolic Panel     Standing Status:   Future     Standing Expiration Date:   3/6/2024    Ferritin     Standing Status:   Future     Standing Expiration Date:   3/6/2024    Hemoglobin A1C     Standing Status:   Future     Standing Expiration Date:   3/6/2024    Iron and TIBC     Standing Status:   Future     Standing Expiration Date:   3/6/2024     Order Specific Question:   Is Patient Fasting? Answer:   yes     Order Specific Question:   No of Hours? Answer:   12    Lipid Panel     Standing Status:   Future     Standing Expiration Date:   3/6/2024     Order Specific Question:   Is Patient Fasting?/# of Hours     Answer:   12    Magnesium     Standing Status:   Future     Standing Expiration Date:   3/6/2024    PTH, Intact     Standing Status:   Future     Standing Expiration Date:   3/6/2024    TSH     Standing Status:   Future     Standing Expiration Date:   3/6/2024    Vitamin A     Standing Status:   Future     Standing Expiration Date:   3/6/2024    Vitamin B1     Standing Status:   Future     Standing Expiration Date:   3/6/2024    Vitamin B12 & Folate     Standing Status:   Future     Standing Expiration Date:   3/6/2024    Vitamin D 25 Hydroxy     Standing Status:   Future     Standing Expiration Date:   3/6/2024    Zinc     Standing Status:   Future     Standing Expiration Date:   3/6/2024       Prescriptions this encounter:  No orders of the defined types were placed in this encounter.       Electronically signed by:  Meseret James CNP

## 2023-03-06 NOTE — PROGRESS NOTES
Medical Nutrition Therapy  Metabolic and Bariatric surgery  3 month follow up        Pt reports: She states she is still struggling to get small frequent meals in. She was using the [email or web address] rosa to help her stay accountable but has fallen away from this and is only eating 2 times per day. She has been focusing on keeping her hands busy to prevent eating out of boredom. She states she gets on the scale 4x per week. She is taking some recommended vitamins but has yet to start the b-complex and calcium as recommended as of 1-week post-op. She is walking and tracking her steps to remain active. Vitals:   Vitals:    03/06/23 1212   BP: 110/80   Site: Right Upper Arm   Position: Sitting   Cuff Size: Large Adult   Pulse: 70   Weight: 206 lb (93.4 kg)   Height: 4' 9\" (1.448 m)      Body mass index is 44.58 kg/m². Multivitamin/mineral intake: Women's MVI, Iron, and Vitamin D  Calcium intake: Not taking, has been made aware these are recommended daily for adequate nutrition and health and prevention of deficiencies       Nutrition Assessment:   PES: Inadequate food and beverage intake r/t WLS as evidenced by loss of excess body weight lost 48 lbs over 3 months. Plan:     F/u 6 months after surgery       Goals   60-80gm of protein. Eat every 3-5 hours. 48-64oz of sugar-free fluid. Continue mvi with iron. Start b-complex. Start calcium supplement. Continue walking and add resistance/strength training.        Rubén Rodriguez, MS, RD, LD

## 2023-03-23 ENCOUNTER — TELEMEDICINE (OUTPATIENT)
Dept: FAMILY MEDICINE CLINIC | Age: 28
End: 2023-03-23
Payer: COMMERCIAL

## 2023-03-23 DIAGNOSIS — F33.1 MODERATE EPISODE OF RECURRENT MAJOR DEPRESSIVE DISORDER (HCC): Primary | ICD-10-CM

## 2023-03-23 DIAGNOSIS — F41.1 GENERALIZED ANXIETY DISORDER: ICD-10-CM

## 2023-03-23 PROCEDURE — 99214 OFFICE O/P EST MOD 30 MIN: CPT | Performed by: NURSE PRACTITIONER

## 2023-03-23 RX ORDER — SERTRALINE HYDROCHLORIDE 25 MG/1
25 TABLET, FILM COATED ORAL DAILY
Qty: 30 TABLET | Refills: 1 | Status: SHIPPED | OUTPATIENT
Start: 2023-03-23

## 2023-03-23 ASSESSMENT — PATIENT HEALTH QUESTIONNAIRE - PHQ9
SUM OF ALL RESPONSES TO PHQ QUESTIONS 1-9: 15
SUM OF ALL RESPONSES TO PHQ QUESTIONS 1-9: 15
6. FEELING BAD ABOUT YOURSELF - OR THAT YOU ARE A FAILURE OR HAVE LET YOURSELF OR YOUR FAMILY DOWN: 2
SUM OF ALL RESPONSES TO PHQ9 QUESTIONS 1 & 2: 2
7. TROUBLE CONCENTRATING ON THINGS, SUCH AS READING THE NEWSPAPER OR WATCHING TELEVISION: 3
9. THOUGHTS THAT YOU WOULD BE BETTER OFF DEAD, OR OF HURTING YOURSELF: 0
10. IF YOU CHECKED OFF ANY PROBLEMS, HOW DIFFICULT HAVE THESE PROBLEMS MADE IT FOR YOU TO DO YOUR WORK, TAKE CARE OF THINGS AT HOME, OR GET ALONG WITH OTHER PEOPLE: 1
1. LITTLE INTEREST OR PLEASURE IN DOING THINGS: 1
2. FEELING DOWN, DEPRESSED OR HOPELESS: 1
SUM OF ALL RESPONSES TO PHQ QUESTIONS 1-9: 15
8. MOVING OR SPEAKING SO SLOWLY THAT OTHER PEOPLE COULD HAVE NOTICED. OR THE OPPOSITE, BEING SO FIGETY OR RESTLESS THAT YOU HAVE BEEN MOVING AROUND A LOT MORE THAN USUAL: 2
3. TROUBLE FALLING OR STAYING ASLEEP: 2
4. FEELING TIRED OR HAVING LITTLE ENERGY: 3
SUM OF ALL RESPONSES TO PHQ QUESTIONS 1-9: 15
5. POOR APPETITE OR OVEREATING: 1

## 2023-03-23 ASSESSMENT — ENCOUNTER SYMPTOMS
CHEST TIGHTNESS: 0
SHORTNESS OF BREATH: 0
CONSTIPATION: 0
DIARRHEA: 0
ABDOMINAL PAIN: 0
COLOR CHANGE: 0

## 2023-03-23 NOTE — PROGRESS NOTES
Axel Espinoza (:  1995) is a Established patient, here for evaluation of the following:    Assessment & Plan   Below is the assessment and plan developed based on review of pertinent history, physical exam, labs, studies, and medications. 1. Moderate episode of recurrent major depressive disorder (HCC)  -     sertraline (ZOLOFT) 25 MG tablet; Take 1 tablet by mouth daily, Disp-30 tablet, R-1Normal  2. Generalized anxiety disorder  -     sertraline (ZOLOFT) 25 MG tablet; Take 1 tablet by mouth daily, Disp-30 tablet, R-1Normal    Discussed dosing, administration, side effects of sertraline. Return in 4 weeks (on 2023), or if symptoms worsen or fail to improve, for anxiety/dep follow up, Med Check. Subjective   HPI      Here for concerns for depression. She has seeing therapist since prior to bariatric surgery and therapist had discussed signs of depression, but Marilu didn't feel depressed at the time. She is now noticing lack of motivation, not wanting to be around people, and problematic as she is in school. She has also noticed mood swings, more irritable and sad at times. Recently declined to go out with friends, which is unlike her. She does report reduction in anxiety symptoms since surgery. Her therapist has also discussed with her body dysmorphia following surgery, and noted a decline in Marilu talking during sessions. Therapist suggested treatment for depression. She has taken 10 mg of possibly medication (believes possibly lexapro?) in the past, which she stopped abruptly and had suicidal thoughts. These thoughts resolved with time and have not recurred. Post bariatric surgery in 2022. Overall feeling well physically. She has lost 64 lbs total. She is taking a lot of vitamins. Care gaps: COVID-19 vaccine:    Moderna x3  Colon CA screening:   n/a  Vaccines:   Hepatitis C/HIV screens:    discussed - low risk  Breast CA screening:   n/a  OB/GYN, cervical CA

## 2023-03-27 PROBLEM — E86.0 DEHYDRATION: Status: RESOLVED | Noted: 2023-02-25 | Resolved: 2023-03-27

## 2023-04-19 RX ORDER — PANTOPRAZOLE SODIUM 40 MG/1
40 TABLET, DELAYED RELEASE ORAL DAILY
Qty: 30 TABLET | Refills: 3 | Status: SHIPPED | OUTPATIENT
Start: 2023-04-19

## 2023-04-26 ENCOUNTER — HOSPITAL ENCOUNTER (OUTPATIENT)
Age: 28
Setting detail: SPECIMEN
Discharge: HOME | End: 2023-04-26

## 2023-04-26 DIAGNOSIS — E28.2 PCO (POLYCYSTIC OVARIES): ICD-10-CM

## 2023-04-26 DIAGNOSIS — Z98.84 S/P LAPAROSCOPIC SLEEVE GASTRECTOMY: ICD-10-CM

## 2023-04-26 DIAGNOSIS — K21.9 GERD WITHOUT ESOPHAGITIS: ICD-10-CM

## 2023-04-26 DIAGNOSIS — E66.01 MORBID OBESITY (HCC): ICD-10-CM

## 2023-04-26 LAB
25(OH)D3 SERPL-MCNC: 21.1 NG/ML
ABSOLUTE EOS #: 0.05 K/UL (ref 0–0.44)
ABSOLUTE IMMATURE GRANULOCYTE: <0.03 K/UL (ref 0–0.3)
ABSOLUTE LYMPH #: 1.51 K/UL (ref 1.1–3.7)
ABSOLUTE MONO #: 0.38 K/UL (ref 0.1–1.2)
ALBUMIN SERPL-MCNC: 3.9 G/DL (ref 3.5–5.2)
ALBUMIN/GLOBULIN RATIO: 1.3 (ref 1–2.5)
ALP SERPL-CCNC: 88 U/L (ref 35–104)
ALT SERPL-CCNC: 12 U/L (ref 5–33)
ANION GAP SERPL CALCULATED.3IONS-SCNC: 14 MMOL/L (ref 9–17)
AST SERPL-CCNC: 16 U/L
BASOPHILS # BLD: 1 % (ref 0–2)
BASOPHILS ABSOLUTE: 0.03 K/UL (ref 0–0.2)
BILIRUB SERPL-MCNC: 1 MG/DL (ref 0.3–1.2)
BUN SERPL-MCNC: 12 MG/DL (ref 6–20)
CALCIUM SERPL-MCNC: 9.3 MG/DL (ref 8.6–10.4)
CHLORIDE SERPL-SCNC: 107 MMOL/L (ref 98–107)
CHOLEST SERPL-MCNC: 140 MG/DL
CHOLESTEROL/HDL RATIO: 3.8
CO2 SERPL-SCNC: 19 MMOL/L (ref 20–31)
CREAT SERPL-MCNC: 0.55 MG/DL (ref 0.5–0.9)
EOSINOPHILS RELATIVE PERCENT: 1 % (ref 1–4)
FERRITIN SERPL-MCNC: 71 NG/ML (ref 13–150)
FOLATE SERPL-MCNC: 8.2 NG/ML
GFR SERPL CREATININE-BSD FRML MDRD: >60 ML/MIN/1.73M2
GLUCOSE SERPL-MCNC: 70 MG/DL (ref 70–99)
HCT VFR BLD AUTO: 41.6 % (ref 36.3–47.1)
HDLC SERPL-MCNC: 37 MG/DL
HGB BLD-MCNC: 13.6 G/DL (ref 11.9–15.1)
IMMATURE GRANULOCYTES: 0 %
IRON SATURATION: 33 % (ref 20–55)
IRON SERPL-MCNC: 102 UG/DL (ref 37–145)
LDLC SERPL CALC-MCNC: 94 MG/DL (ref 0–130)
LYMPHOCYTES # BLD: 23 % (ref 24–43)
MAGNESIUM SERPL-MCNC: 2.1 MG/DL (ref 1.6–2.6)
MCH RBC QN AUTO: 29.2 PG (ref 25.2–33.5)
MCHC RBC AUTO-ENTMCNC: 32.7 G/DL (ref 28.4–34.8)
MCV RBC AUTO: 89.5 FL (ref 82.6–102.9)
MONOCYTES # BLD: 6 % (ref 3–12)
NRBC AUTOMATED: 0 PER 100 WBC
PDW BLD-RTO: 12.2 % (ref 11.8–14.4)
PLATELET # BLD AUTO: 289 K/UL (ref 138–453)
PMV BLD AUTO: 10.6 FL (ref 8.1–13.5)
POTASSIUM SERPL-SCNC: 4 MMOL/L (ref 3.7–5.3)
PROT SERPL-MCNC: 6.9 G/DL (ref 6.4–8.3)
PTH-INTACT SERPL-MCNC: 32.6 PG/ML (ref 14–72)
RBC # BLD: 4.65 M/UL (ref 3.95–5.11)
SEG NEUTROPHILS: 69 % (ref 36–65)
SEGMENTED NEUTROPHILS ABSOLUTE COUNT: 4.56 K/UL (ref 1.5–8.1)
SODIUM SERPL-SCNC: 140 MMOL/L (ref 135–144)
TIBC SERPL-MCNC: 313 UG/DL (ref 250–450)
TRIGL SERPL-MCNC: 46 MG/DL
TSH SERPL-ACNC: 1.83 UIU/ML (ref 0.3–5)
UNSATURATED IRON BINDING CAPACITY: 211 UG/DL (ref 112–347)
VIT B12 SERPL-MCNC: 373 PG/ML (ref 232–1245)
WBC # BLD AUTO: 6.6 K/UL (ref 3.5–11.3)

## 2023-04-27 DIAGNOSIS — E55.9 VITAMIN D DEFICIENCY: ICD-10-CM

## 2023-04-27 LAB
EST. AVERAGE GLUCOSE BLD GHB EST-MCNC: 85 MG/DL
HBA1C MFR BLD: 4.6 % (ref 4–6)

## 2023-04-27 RX ORDER — ERGOCALCIFEROL 1.25 MG/1
50000 CAPSULE ORAL WEEKLY
Qty: 8 CAPSULE | Refills: 0 | Status: SHIPPED | OUTPATIENT
Start: 2023-04-27 | End: 2023-06-16

## 2023-04-28 LAB — ZINC: 102.7 UG/DL (ref 60–120)

## 2023-04-29 LAB
RETINYL PALMITATE: <0.02 MG/L (ref 0–0.1)
VITAMIN A LEVEL: 0.44 MG/L (ref 0.3–1.2)
VITAMIN A, INTERP: NORMAL

## 2023-04-30 LAB — VIT B1 PYROPHOSHATE BLD-SCNC: 97 NMOL/L (ref 70–180)

## 2023-05-09 ENCOUNTER — TELEMEDICINE (OUTPATIENT)
Dept: OBGYN CLINIC | Age: 28
End: 2023-05-09
Payer: COMMERCIAL

## 2023-05-09 DIAGNOSIS — E28.2 PCO (POLYCYSTIC OVARIES): Primary | ICD-10-CM

## 2023-05-09 DIAGNOSIS — Z98.84 S/P LAPAROSCOPIC SLEEVE GASTRECTOMY: ICD-10-CM

## 2023-05-09 PROCEDURE — 99212 OFFICE O/P EST SF 10 MIN: CPT | Performed by: OBSTETRICS & GYNECOLOGY

## 2023-05-09 ASSESSMENT — ENCOUNTER SYMPTOMS
BACK PAIN: 0
ABDOMINAL PAIN: 0
SHORTNESS OF BREATH: 0
COUGH: 0

## 2023-05-09 NOTE — PROGRESS NOTES
Yes     Partners: Male   Other Topics Concern    Not on file   Social History Narrative    Not on file     Social Determinants of Health     Financial Resource Strain: Low Risk     Difficulty of Paying Living Expenses: Not hard at all   Food Insecurity: No Food Insecurity    Worried About [address] in the Last Year: Never true    [address] in the Last Year: Never true   Transportation Needs: No Transportation Needs    Lack of Transportation (Medical): No    Lack of Transportation (Non-Medical): No   Physical Activity: Not on file   Stress: Not on file   Social Connections: Not on file   Intimate Partner Violence: Not on file   Housing Stability: Unknown    Unable to Pay for Housing in the Last Year: No    Number of Places Lived in the Last Year: Not on file    Unstable Housing in the Last Year: No     Family History   Problem Relation Age of Onset    Cancer Mother         uterine or ovarian CA    Migraines Mother     High Blood Pressure Mother     Obesity Mother     High Blood Pressure Father     Obesity Father     Cancer Maternal Grandmother     Diabetes Maternal Grandmother     Cancer Paternal Grandmother     Cancer Maternal Aunt        Physical exam Physical Exam  Constitutional:       Appearance: Normal appearance. She is obese. HENT:      Head: Normocephalic. Eyes:      Extraocular Movements: Extraocular movements intact. Pulmonary:      Effort: Pulmonary effort is normal.   Neurological:      Mental Status: She is alert and oriented to person, place, and time. Psychiatric:         Mood and Affect: Mood normal.         Behavior: Behavior normal.         Thought Content:  Thought content normal.         Judgment: Judgment normal.       Assessment/Plan  Patient Active Problem List    Diagnosis Date Noted    Morbid obesity ([facility] [address] 12/22/2022     Priority: Medium    S/P laparoscopic sleeve gastrectomy 12/05/2022     Priority: Medium    Vitamin D deficiency 11/03/2022     Priority: Medium

## 2023-05-26 ENCOUNTER — HOSPITAL ENCOUNTER (OUTPATIENT)
Age: 28
Setting detail: SPECIMEN
Discharge: HOME | End: 2023-05-26

## 2023-05-26 ENCOUNTER — OFFICE VISIT (OUTPATIENT)
Dept: FAMILY MEDICINE CLINIC | Age: 28
End: 2023-05-26

## 2023-05-26 VITALS
BODY MASS INDEX: 41.12 KG/M2 | SYSTOLIC BLOOD PRESSURE: 110 MMHG | DIASTOLIC BLOOD PRESSURE: 88 MMHG | WEIGHT: 190 LBS | HEART RATE: 70 BPM

## 2023-05-26 DIAGNOSIS — R35.0 FREQUENT URINATION: ICD-10-CM

## 2023-05-26 DIAGNOSIS — N39.0 ACUTE UTI: Primary | ICD-10-CM

## 2023-05-26 PROBLEM — L21.9 SEBORRHEIC DERMATITIS OF SCALP: Status: ACTIVE | Noted: 2020-12-22

## 2023-05-26 PROBLEM — F32.A DEPRESSION: Status: ACTIVE | Noted: 2023-05-26

## 2023-05-26 LAB
BILIRUBIN, POC: ABNORMAL
BLOOD URINE, POC: ABNORMAL
CLARITY, POC: ABNORMAL
COLOR, POC: YELLOW
GLUCOSE URINE, POC: ABNORMAL
KETONES, POC: ABNORMAL
LEUKOCYTE EST, POC: ABNORMAL
NITRITE, POC: ABNORMAL
PH, POC: 6
PROTEIN, POC: ABNORMAL
SPECIFIC GRAVITY, POC: 1.02
UROBILINOGEN, POC: ABNORMAL

## 2023-05-26 RX ORDER — CEPHALEXIN 500 MG/1
500 CAPSULE ORAL 2 TIMES DAILY
Qty: 10 CAPSULE | Refills: 0 | Status: SHIPPED | OUTPATIENT
Start: 2023-05-26 | End: 2023-05-31

## 2023-05-26 ASSESSMENT — ENCOUNTER SYMPTOMS
BACK PAIN: 0
VOMITING: 0
ABDOMINAL DISTENTION: 0
NAUSEA: 0
ABDOMINAL PAIN: 1

## 2023-05-26 NOTE — PROGRESS NOTES
HENT:      Head: Normocephalic and atraumatic. Right Ear: Ear canal normal.      Left Ear: Ear canal normal.      Nose: Nose normal.      Mouth/Throat:      Lips: Pink. Mouth: Mucous membranes are moist.      Pharynx: Oropharynx is clear. Uvula midline. Eyes:      Extraocular Movements: Extraocular movements intact. Conjunctiva/sclera: Conjunctivae normal.      Pupils: Pupils are equal, round, and reactive to light. Cardiovascular:      Rate and Rhythm: Normal rate and regular rhythm. Pulses: Normal pulses. Pulmonary:      Effort: Pulmonary effort is normal.      Breath sounds: Normal breath sounds. Abdominal:      General: Bowel sounds are normal.      Palpations: Abdomen is soft. Tenderness: There is abdominal tenderness in the suprapubic area. There is no right CVA tenderness or left CVA tenderness. Musculoskeletal:         General: Normal range of motion. Cervical back: Normal range of motion and neck supple. Skin:     General: Skin is warm and dry. Capillary Refill: Capillary refill takes less than 2 seconds. Coloration: Skin is not pale. Neurological:      Mental Status: She is alert and oriented to person, place, and time. Gait: Gait normal.   Psychiatric:         Mood and Affect: Mood normal.         Thought Content: Thought content normal.         MEDICAL DECISION MAKING Assessment/Plan:     Monica Melendrez was seen today for urinary tract infection. Diagnoses and all orders for this visit:    Acute UTI  -     cephALEXin (KEFLEX) 500 MG capsule; Take 1 capsule by mouth 2 times daily for 5 days    Frequent urination  -     POCT Urinalysis no Micro  -     Culture, Urine; Future  -     cephALEXin (KEFLEX) 500 MG capsule;  Take 1 capsule by mouth 2 times daily for 5 days        Results for orders placed or performed in visit on 05/26/23   POCT Urinalysis no Micro   Result Value Ref Range    Color, UA yellow     Clarity, UA cloudy     Glucose, UA POC -

## 2023-05-27 DIAGNOSIS — R35.0 FREQUENT URINATION: ICD-10-CM

## 2023-05-28 DIAGNOSIS — N30.00 ACUTE CYSTITIS WITHOUT HEMATURIA: Primary | ICD-10-CM

## 2023-05-28 LAB
MICROORGANISM SPEC CULT: ABNORMAL
SPECIMEN DESCRIPTION: ABNORMAL

## 2023-05-28 RX ORDER — NITROFURANTOIN 25; 75 MG/1; MG/1
100 CAPSULE ORAL 2 TIMES DAILY
Qty: 10 CAPSULE | Refills: 0 | Status: SHIPPED | OUTPATIENT
Start: 2023-05-28 | End: 2023-06-02

## 2023-06-01 RX ORDER — ONDANSETRON 4 MG/1
4 TABLET, FILM COATED ORAL EVERY 8 HOURS PRN
Qty: 30 TABLET | Refills: 2 | Status: SHIPPED | OUTPATIENT
Start: 2023-06-01

## 2023-06-09 ENCOUNTER — HOSPITAL ENCOUNTER (OUTPATIENT)
Age: 28
Setting detail: SPECIMEN
Discharge: HOME | End: 2023-06-09

## 2023-06-09 ENCOUNTER — OFFICE VISIT (OUTPATIENT)
Dept: OBGYN CLINIC | Age: 28
End: 2023-06-09
Payer: COMMERCIAL

## 2023-06-09 VITALS
HEIGHT: 57 IN | HEART RATE: 86 BPM | DIASTOLIC BLOOD PRESSURE: 88 MMHG | BODY MASS INDEX: 40.34 KG/M2 | WEIGHT: 187 LBS | SYSTOLIC BLOOD PRESSURE: 132 MMHG

## 2023-06-09 DIAGNOSIS — N89.8 VAGINAL IRRITATION: ICD-10-CM

## 2023-06-09 DIAGNOSIS — N94.10 DYSPAREUNIA IN FEMALE: ICD-10-CM

## 2023-06-09 DIAGNOSIS — Z01.419 ENCOUNTER FOR WELL WOMAN EXAM WITH ROUTINE GYNECOLOGICAL EXAM: Primary | ICD-10-CM

## 2023-06-09 PROCEDURE — 99395 PREV VISIT EST AGE 18-39: CPT | Performed by: OBSTETRICS & GYNECOLOGY

## 2023-06-09 ASSESSMENT — ENCOUNTER SYMPTOMS
BACK PAIN: 0
COUGH: 0
SHORTNESS OF BREATH: 0
ABDOMINAL PAIN: 0

## 2023-06-09 NOTE — PROGRESS NOTES
Chaperone for Intimate Exam  Chaperone was offered as part of the rooming process. Patient declined and agrees to continue with exam without a chaperone.
PCOS (polycystic ovarian syndrome)     Dr. Thornton Matters examination     [facility] [address] [facility] [place] last visit 2022.  appt 2022 for medical clearance     Past Surgical History:   Past Surgical History:   Procedure Laterality Date    SLEEVE GASTRECTOMY N/A 2022    ROBOTIC LAPAROSCOPIC GASTRIC SLEEVE, EGD    SLEEVE GASTRECTOMY N/A 2022    XI ROBOTIC LAPAROSCOPIC GASTRIC SLEEVE, EGD performed by Rosemary Dillard DO at [address] N/A 2022    EGD BIOPSY performed by Rosemary Dillard DO at [address] [address].     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   Caffeine Yes    At risk for depression No    Abuse:   No  Seatbelt:   Yes  Exercise:  yes    Social History     Socioeconomic History    Marital status:      Spouse name: Not on file    Number of children: Not on file    Years of education: Not on file    Highest education level: Not on file   Occupational History    Not on file   Tobacco Use    Smoking status: Never     Passive exposure: Current ( - outside)    Smokeless tobacco: Never   Vaping Use    Vaping Use: Never used   Substance and Sexual Activity    Alcohol use: Not Currently     Comment: rare    Drug use: Never    Sexual activity: Yes     Partners: Male   Other Topics Concern    Not on file   Social History Narrative    Not on file     Social Determinants of Health     Financial Resource Strain: Low Risk     Difficulty of Paying Living Expenses: Not hard at all   Food Insecurity: No Food Insecurity    Worried About [facility] in the Last Year:

## 2023-06-26 ENCOUNTER — OFFICE VISIT (OUTPATIENT)
Dept: FAMILY MEDICINE CLINIC | Age: 28
End: 2023-06-26
Payer: COMMERCIAL

## 2023-06-26 VITALS
HEART RATE: 87 BPM | TEMPERATURE: 97.8 F | RESPIRATION RATE: 16 BRPM | DIASTOLIC BLOOD PRESSURE: 84 MMHG | BODY MASS INDEX: 39.82 KG/M2 | OXYGEN SATURATION: 98 % | WEIGHT: 184 LBS | SYSTOLIC BLOOD PRESSURE: 126 MMHG

## 2023-06-26 DIAGNOSIS — J01.90 ACUTE BACTERIAL SINUSITIS: Primary | ICD-10-CM

## 2023-06-26 DIAGNOSIS — B96.89 ACUTE BACTERIAL SINUSITIS: Primary | ICD-10-CM

## 2023-06-26 DIAGNOSIS — H65.193 ACUTE MEE (MIDDLE EAR EFFUSION), BILATERAL: ICD-10-CM

## 2023-06-26 PROCEDURE — 99213 OFFICE O/P EST LOW 20 MIN: CPT | Performed by: NURSE PRACTITIONER

## 2023-06-26 RX ORDER — AMOXICILLIN AND CLAVULANATE POTASSIUM 875; 125 MG/1; MG/1
1 TABLET, FILM COATED ORAL 2 TIMES DAILY
Qty: 20 TABLET | Refills: 0 | Status: SHIPPED | OUTPATIENT
Start: 2023-06-26 | End: 2023-07-06

## 2023-06-26 RX ORDER — FLUTICASONE PROPIONATE 50 MCG
2 SPRAY, SUSPENSION (ML) NASAL DAILY
Qty: 16 G | Refills: 0 | Status: SHIPPED | OUTPATIENT
Start: 2023-06-26

## 2023-06-26 ASSESSMENT — ENCOUNTER SYMPTOMS
COUGH: 1
VOICE CHANGE: 0
SHORTNESS OF BREATH: 0
EYE PAIN: 0
RHINORRHEA: 1
VOMITING: 0
CHEST TIGHTNESS: 0
SORE THROAT: 1
SINUS PAIN: 1
TROUBLE SWALLOWING: 0
NAUSEA: 0
SINUS PRESSURE: 1

## 2023-10-26 ENCOUNTER — TELEPHONE (OUTPATIENT)
Dept: FAMILY MEDICINE CLINIC | Age: 28
End: 2023-10-26

## 2023-11-27 ENCOUNTER — TELEPHONE (OUTPATIENT)
Dept: OBGYN CLINIC | Age: 28
End: 2023-11-27

## 2023-11-27 NOTE — TELEPHONE ENCOUNTER
GYN pt called in stating she went to the ER 11/23 for abdominal pain. Pt states Hospital ran a blood hcg that came back positive for pregnancy. Hospital told her it could be a false positive. Pt has not taken a urine pregnancy test and plans to do so. Pt is wondering if she should do a repeat HCG quants as she has fertility issues and just wants to make sure she is or is not infact pregnant.      Pls advise

## 2023-11-28 ENCOUNTER — TELEPHONE (OUTPATIENT)
Dept: OBGYN CLINIC | Age: 28
End: 2023-11-28

## 2023-11-28 ENCOUNTER — HOSPITAL ENCOUNTER (OUTPATIENT)
Age: 28
Setting detail: SPECIMEN
Discharge: HOME | End: 2023-11-28

## 2023-11-28 ENCOUNTER — TELEPHONE (OUTPATIENT)
Dept: FAMILY MEDICINE CLINIC | Age: 28
End: 2023-11-28

## 2023-11-28 DIAGNOSIS — R30.0 DYSURIA: Primary | ICD-10-CM

## 2023-11-28 DIAGNOSIS — Z32.01 POSITIVE PREGNANCY TEST: Primary | ICD-10-CM

## 2023-11-28 DIAGNOSIS — Z32.01 POSITIVE PREGNANCY TEST: ICD-10-CM

## 2023-11-28 RX ORDER — CEPHALEXIN 500 MG/1
500 CAPSULE ORAL 2 TIMES DAILY
Qty: 14 CAPSULE | Refills: 0 | Status: SHIPPED | OUTPATIENT
Start: 2023-11-28 | End: 2023-12-05

## 2023-11-28 NOTE — TELEPHONE ENCOUNTER
Informed pt of providers recommendation and that once we received her lab results we would reach out to her for POC.

## 2023-11-28 NOTE — TELEPHONE ENCOUNTER
Patient called into the office on 11/28/23 and stated that she has a UTI and would like to be treated for it. Patient works in a [employer] office, that is where she tested her urine. Unfortunately, the patient does not have insurance, newly pregnant, and is trying to pass a kidney stone. Patient is concerned and don't know what to do at this time.   Please advise

## 2023-11-28 NOTE — TELEPHONE ENCOUNTER
LMP ?     Last Seen:06.09.23    Next Appt: N/A    C/O  Medication clarification     Pt is having pain associated with passing a kidney stone. She had been taking 1000 mg of ibuprofen started 11.23.23 and then 11.25-11.27 once a day. Pt was told she had a false positive hcg at ER and we are wait for second Hcg to confirm if pt is pregnant. Pt wants to know if the ibuprofen she took in that time will have harmed/ affected the pregnancy. She would also like to know specific clarification on how much and frequently pt can take tylenol of 500 mg. Advised pt she could take prescribed dose on tylenol package, would reach out to provider for further clarification. Pt stated she had received medication safe list already and denied offer of sending one through [employer].        Please Advise

## 2023-11-29 LAB — B-HCG SERPL EIA 3RD IS-ACNC: 359.1 MIU/ML

## 2023-12-06 ENCOUNTER — HOSPITAL ENCOUNTER (OUTPATIENT)
Age: 28
Setting detail: SPECIMEN
Discharge: HOME | End: 2023-12-06

## 2023-12-06 LAB — B-HCG SERPL EIA 3RD IS-ACNC: 6280 MIU/ML

## 2023-12-07 ENCOUNTER — TELEPHONE (OUTPATIENT)
Dept: OBGYN CLINIC | Age: 28
End: 2023-12-07

## 2023-12-07 NOTE — TELEPHONE ENCOUNTER
Called pt to answer her questions regarding vitamin, dietary restrictions and not cleaning ziyad litter if at all possible. Needing 60 gm protein per day. Scheduled VV with Dr. Chaya Salazar on 12/13/23 to review POC with her vitamins in newly preg and having gastric-sleeve.

## 2023-12-07 NOTE — TELEPHONE ENCOUNTER
Pt informed of quant of Zofran and that [facility] will be reaching out at end of day.

## 2023-12-07 NOTE — TELEPHONE ENCOUNTER
LMP    Last Seen: 06.09.23    Next Appt: 01.05.23    C/O  1yr post op Bariatric    Pt has question about early pregnancy stage as this is first pregnancy and pt is 1yr post-op. Pt wants to speak with nurse in office more in depth. Pt wants to know how much Zofran she could take in a day and how often? Is there another anti-nausea medication and f/u up with prenatal and vitamin intake. Advised pt would reach out to KATIE Salcido to see if she could schedule or call pt.        Please Advise

## 2023-12-13 ENCOUNTER — TELEMEDICINE (OUTPATIENT)
Dept: OBGYN CLINIC | Age: 28
End: 2023-12-13
Payer: COMMERCIAL

## 2023-12-13 DIAGNOSIS — Z34.90 EARLY STAGE OF PREGNANCY: Primary | ICD-10-CM

## 2023-12-13 PROCEDURE — 99213 OFFICE O/P EST LOW 20 MIN: CPT | Performed by: OBSTETRICS & GYNECOLOGY

## 2023-12-13 RX ORDER — LANOLIN ALCOHOL/MO/W.PET/CERES
50 CREAM (GRAM) TOPICAL 4 TIMES DAILY
Qty: 90 TABLET | Refills: 1 | Status: SHIPPED | OUTPATIENT
Start: 2023-12-13

## 2023-12-13 ASSESSMENT — ENCOUNTER SYMPTOMS
SHORTNESS OF BREATH: 0
ABDOMINAL PAIN: 0
COUGH: 0
BACK PAIN: 0

## 2023-12-13 NOTE — PROGRESS NOTES
[facility]  [facility] [address] Jennifer Sheppard  Dept: [phone]  Dept Fax: [phone]  23  3:39 PM      TELEHEALTH VIDEO VISIT    This visit was completed via MyChart video due to the restrictions of the COVID-19 pandemic. All issues as below were discussed and addressed but no physical exam was performed unless allowed by visual confirmation on MyChart video. Reviewed that if it was felt that the patient should be evaluated in clinic then she would be directed there. The patient verbally consented to visit. Freddie Ramesh is a 33 y.o. Leeta Salt. She is the only one present and is currently in her car alone. Reports chief complaint of discussion about medications/vitamins that are safe to take in pregnancy. She is newly diagnosed as pregnant, but hasn't had a confirmation visit with an ultrasound yet. She is having a lot of fatigue and nausea especially in the morning. She has a h/o gastric sleeve. She is taking PNVs.  She is trying to decide if she wants to continue the pregnancy. She is with a new partner and  from her partner. Review of Systems   Constitutional:  Negative for chills and fever. HENT:  Negative for congestion. Respiratory:  Negative for cough and shortness of breath. Cardiovascular:  Negative for chest pain and palpitations. Gastrointestinal:  Negative for abdominal pain. Genitourinary:  Negative for dyspareunia, pelvic pain and vaginal discharge. Musculoskeletal:  Negative for back pain. Neurological:  Negative for dizziness and light-headedness. Psychiatric/Behavioral:  The patient is not nervous/anxious. Gynecologic History  No LMP recorded.   Contraception: none  Last Pap: 22  Results: normal  Last Mammogram: n/a    Obstetric History  : 1  Para: 0  AB: 0    Past Medical History:   Diagnosis Date    ADHD (attention deficit

## 2024-01-04 SDOH — ECONOMIC STABILITY: INCOME INSECURITY: HOW HARD IS IT FOR YOU TO PAY FOR THE VERY BASICS LIKE FOOD, HOUSING, MEDICAL CARE, AND HEATING?: NOT HARD AT ALL

## 2024-01-04 SDOH — ECONOMIC STABILITY: FOOD INSECURITY: WITHIN THE PAST 12 MONTHS, YOU WORRIED THAT YOUR FOOD WOULD RUN OUT BEFORE YOU GOT MONEY TO BUY MORE.: NEVER TRUE

## 2024-01-04 SDOH — ECONOMIC STABILITY: FOOD INSECURITY: WITHIN THE PAST 12 MONTHS, THE FOOD YOU BOUGHT JUST DIDN'T LAST AND YOU DIDN'T HAVE MONEY TO GET MORE.: NEVER TRUE

## 2024-01-04 ASSESSMENT — ENCOUNTER SYMPTOMS
CONSTIPATION: 0
VOMITING: 0

## 2024-01-04 NOTE — PROGRESS NOTES
[facility], [facility] - [facility]  [address]  [address]  [facility] [facility] [id number]  Dept: [phone]      CC: Confirmation of pregnancy   Chief Complaint   Patient presents with    Other     Last pap 6/7/2022    Amenorrhea      Subjective:   Siddharth Fermin is being seen today for confirmation of pregnancy. Her last menstrual period started: No LMP recorded., Pelvic ultrasound performed today, estimated gestational age is 9 weeks three days.     This is not a planned pregnancy. She is going through an amicable divorce. Pregnancy history fully reviewed.     Following up with gastric after having sleeve surgery approximately one year ago.     She:  -admits to History of gastric bypass, hypertension, pcos,  AND BMI >25. She is agreeable to checking blood sugars x 2 weeks. Log given  -admits to History of CHTN well controlled without medication  -denies personal/family history of any bleeding/clotting disorders.  -denies History of severe anemia  -denies History of genital HSV  -denies History of uterine surgeries or LEEP:   -admits to History of anxiety. Has tried zoloft and lexapro previously,Lexapro gave her suicidal thoughts in the past.     Established with PCP  Currently sees specialists: Following up with gastric after having sleeve surgery approximately one year ago.   Current daily medications: prenatal, prn nausea meds    patient's stated ethnicity:   Father of baby's stated ethnicity:    Relationship with FOB: significant other, not living together. FOB name: Ramirez. 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

## 2024-01-05 ENCOUNTER — HOSPITAL ENCOUNTER (OUTPATIENT)
Age: 29
Setting detail: SPECIMEN
Discharge: HOME | End: 2024-01-05

## 2024-01-05 ENCOUNTER — OFFICE VISIT (OUTPATIENT)
Dept: OBGYN CLINIC | Age: 29
End: 2024-01-05

## 2024-01-05 VITALS
WEIGHT: 174 LBS | DIASTOLIC BLOOD PRESSURE: 91 MMHG | HEIGHT: 57 IN | SYSTOLIC BLOOD PRESSURE: 129 MMHG | BODY MASS INDEX: 37.54 KG/M2 | HEART RATE: 75 BPM

## 2024-01-05 DIAGNOSIS — N92.6 MISSED MENSES: ICD-10-CM

## 2024-01-05 DIAGNOSIS — E28.2 PCO (POLYCYSTIC OVARIES): ICD-10-CM

## 2024-01-05 DIAGNOSIS — N92.6 MISSED MENSES: Primary | ICD-10-CM

## 2024-01-05 DIAGNOSIS — O16.1 ELEVATED BLOOD PRESSURE AFFECTING PREGNANCY IN FIRST TRIMESTER, ANTEPARTUM: ICD-10-CM

## 2024-01-05 DIAGNOSIS — O10.919 CHRONIC HYPERTENSION AFFECTING PREGNANCY: ICD-10-CM

## 2024-01-05 DIAGNOSIS — Z98.84 S/P LAPAROSCOPIC SLEEVE GASTRECTOMY: ICD-10-CM

## 2024-01-05 LAB
C TRACH DNA SPEC QL PROBE+SIG AMP: NORMAL
N GONORRHOEA DNA SPEC QL PROBE+SIG AMP: NORMAL
SPECIMEN DESCRIPTION: NORMAL

## 2024-01-05 RX ORDER — LANCETS 30 GAUGE
1 EACH MISCELLANEOUS 4 TIMES DAILY
Qty: 600 EACH | Refills: 1 | Status: SHIPPED | OUTPATIENT
Start: 2024-01-05

## 2024-01-05 RX ORDER — BLOOD-GLUCOSE METER
1 KIT MISCELLANEOUS DAILY
Qty: 1 KIT | Refills: 0 | Status: SHIPPED | OUTPATIENT
Start: 2024-01-05

## 2024-01-05 RX ORDER — LANCING DEVICE
1 EACH MISCELLANEOUS 4 TIMES DAILY
Qty: 1 EACH | Refills: 0 | Status: SHIPPED | OUTPATIENT
Start: 2024-01-05

## 2024-01-05 RX ORDER — GLUCOSAMINE HCL/CHONDROITIN SU 500-400 MG
CAPSULE ORAL
Qty: 200 STRIP | Refills: 0 | Status: SHIPPED | OUTPATIENT
Start: 2024-01-05

## 2024-01-05 ASSESSMENT — ENCOUNTER SYMPTOMS: NAUSEA: 1

## 2024-01-06 LAB
AMPHET UR QL SCN: NEGATIVE
BARBITURATES UR QL SCN: NEGATIVE
BENZODIAZ UR QL: NEGATIVE
BILIRUB UR QL STRIP: NEGATIVE
CANDIDA SPECIES: POSITIVE
CANNABINOIDS UR QL SCN: NEGATIVE
CLARITY UR: CLEAR
COCAINE UR QL SCN: NEGATIVE
COLOR UR: YELLOW
COMMENT: NORMAL
FENTANYL UR QL: NEGATIVE
GARDNERELLA VAGINALIS: NEGATIVE
GLUCOSE UR STRIP-MCNC: NEGATIVE MG/DL
HGB UR QL STRIP.AUTO: NEGATIVE
KETONES UR STRIP-MCNC: NEGATIVE MG/DL
LEUKOCYTE ESTERASE UR QL STRIP: NEGATIVE
METHADONE UR QL: NEGATIVE
MICROORGANISM SPEC CULT: NORMAL
NITRITE UR QL STRIP: NEGATIVE
OPIATES UR QL SCN: NEGATIVE
OXYCODONE UR QL SCN: NEGATIVE
PCP UR QL SCN: NEGATIVE
PH UR STRIP: 7 [PH] (ref 5–8)
PROT UR STRIP-MCNC: NEGATIVE MG/DL
SOURCE: ABNORMAL
SP GR UR STRIP: 1.02 (ref 1–1.03)
SPECIMEN DESCRIPTION: NORMAL
TEST INFORMATION: NORMAL
TRICHOMONAS: NEGATIVE
UROBILINOGEN UR STRIP-ACNC: NORMAL EU/DL (ref 0–1)

## 2024-01-08 ENCOUNTER — PATIENT MESSAGE (OUTPATIENT)
Dept: OBGYN CLINIC | Age: 29
End: 2024-01-08

## 2024-01-08 LAB
C TRACH DNA SPEC QL PROBE+SIG AMP: NEGATIVE
N GONORRHOEA DNA SPEC QL PROBE+SIG AMP: NEGATIVE
SPECIMEN DESCRIPTION: NORMAL

## 2024-01-09 ENCOUNTER — TELEPHONE (OUTPATIENT)
Dept: BARIATRICS/WEIGHT MGMT | Age: 29
End: 2024-01-09

## 2024-01-09 ENCOUNTER — OFFICE VISIT (OUTPATIENT)
Dept: FAMILY MEDICINE CLINIC | Age: 29
End: 2024-01-09
Payer: COMMERCIAL

## 2024-01-09 ENCOUNTER — HOSPITAL ENCOUNTER (OUTPATIENT)
Age: 29
Setting detail: SPECIMEN
Discharge: HOME | End: 2024-01-09

## 2024-01-09 VITALS
HEIGHT: 57 IN | HEART RATE: 78 BPM | BODY MASS INDEX: 37.97 KG/M2 | SYSTOLIC BLOOD PRESSURE: 129 MMHG | WEIGHT: 176 LBS | DIASTOLIC BLOOD PRESSURE: 88 MMHG

## 2024-01-09 DIAGNOSIS — Z98.84 S/P LAPAROSCOPIC SLEEVE GASTRECTOMY: ICD-10-CM

## 2024-01-09 DIAGNOSIS — K21.9 GASTROESOPHAGEAL REFLUX DISEASE WITHOUT ESOPHAGITIS: ICD-10-CM

## 2024-01-09 DIAGNOSIS — E66.01 MORBID OBESITY WITH BMI OF 50.0-59.9, ADULT (HCC): ICD-10-CM

## 2024-01-09 DIAGNOSIS — E66.9 OBESITY (BMI 30-39.9): ICD-10-CM

## 2024-01-09 DIAGNOSIS — Z98.84 STATUS POST LAPAROSCOPIC SLEEVE GASTRECTOMY: Primary | ICD-10-CM

## 2024-01-09 DIAGNOSIS — F33.1 MODERATE EPISODE OF RECURRENT MAJOR DEPRESSIVE DISORDER (HCC): ICD-10-CM

## 2024-01-09 DIAGNOSIS — K90.89 OTHER SPECIFIED INTESTINAL MALABSORPTION: ICD-10-CM

## 2024-01-09 DIAGNOSIS — R30.0 DYSURIA: ICD-10-CM

## 2024-01-09 DIAGNOSIS — K21.9 GERD WITHOUT ESOPHAGITIS: ICD-10-CM

## 2024-01-09 DIAGNOSIS — R10.9 FLANK PAIN: ICD-10-CM

## 2024-01-09 DIAGNOSIS — Z00.00 ENCOUNTER FOR WELL ADULT EXAM WITHOUT ABNORMAL FINDINGS: Primary | ICD-10-CM

## 2024-01-09 DIAGNOSIS — R10.13 EPIGASTRIC PAIN: ICD-10-CM

## 2024-01-09 DIAGNOSIS — E66.01 MORBID OBESITY (HCC): ICD-10-CM

## 2024-01-09 DIAGNOSIS — K59.00 CONSTIPATION, UNSPECIFIED CONSTIPATION TYPE: ICD-10-CM

## 2024-01-09 LAB
BACTERIA URNS QL MICRO: ABNORMAL
BILIRUB UR QL STRIP: NEGATIVE
CLARITY UR: ABNORMAL
COLOR UR: YELLOW
CRYSTALS URNS MICRO: ABNORMAL /HPF
CRYSTALS URNS MICRO: ABNORMAL /HPF
EPI CELLS #/AREA URNS HPF: ABNORMAL /HPF (ref 0–5)
GLUCOSE UR STRIP-MCNC: NEGATIVE MG/DL
HGB UR QL STRIP.AUTO: NEGATIVE
KETONES UR STRIP-MCNC: ABNORMAL MG/DL
LEUKOCYTE ESTERASE UR QL STRIP: ABNORMAL
MUCOUS THREADS URNS QL MICRO: ABNORMAL
NITRITE UR QL STRIP: NEGATIVE
PH UR STRIP: 7 [PH] (ref 5–8)
PROT UR STRIP-MCNC: NEGATIVE MG/DL
RBC #/AREA URNS HPF: ABNORMAL /HPF (ref 0–2)
SP GR UR STRIP: 1.03 (ref 1–1.03)
UROBILINOGEN UR STRIP-ACNC: NORMAL EU/DL (ref 0–1)
WBC #/AREA URNS HPF: ABNORMAL /HPF (ref 0–5)

## 2024-01-09 PROCEDURE — 99395 PREV VISIT EST AGE 18-39: CPT | Performed by: NURSE PRACTITIONER

## 2024-01-09 ASSESSMENT — ENCOUNTER SYMPTOMS
SHORTNESS OF BREATH: 0
CHEST TIGHTNESS: 0
COLOR CHANGE: 0
ABDOMINAL PAIN: 1
CONSTIPATION: 1
DIARRHEA: 0
NAUSEA: 0

## 2024-01-09 ASSESSMENT — COLUMBIA-SUICIDE SEVERITY RATING SCALE - C-SSRS
1. WITHIN THE PAST MONTH, HAVE YOU WISHED YOU WERE DEAD OR WISHED YOU COULD GO TO SLEEP AND NOT WAKE UP?: NO
6. HAVE YOU EVER DONE ANYTHING, STARTED TO DO ANYTHING, OR PREPARED TO DO ANYTHING TO END YOUR LIFE?: NO
2. HAVE YOU ACTUALLY HAD ANY THOUGHTS OF KILLING YOURSELF?: NO

## 2024-01-09 ASSESSMENT — PATIENT HEALTH QUESTIONNAIRE - PHQ9
8. MOVING OR SPEAKING SO SLOWLY THAT OTHER PEOPLE COULD HAVE NOTICED. OR THE OPPOSITE, BEING SO FIGETY OR RESTLESS THAT YOU HAVE BEEN MOVING AROUND A LOT MORE THAN USUAL: 0
4. FEELING TIRED OR HAVING LITTLE ENERGY: 0
2. FEELING DOWN, DEPRESSED OR HOPELESS: 0
SUM OF ALL RESPONSES TO PHQ QUESTIONS 1-9: 1
1. LITTLE INTEREST OR PLEASURE IN DOING THINGS: 0
9. THOUGHTS THAT YOU WOULD BE BETTER OFF DEAD, OR OF HURTING YOURSELF: 0
5. POOR APPETITE OR OVEREATING: 0
SUM OF ALL RESPONSES TO PHQ9 QUESTIONS 1 & 2: 0
10. IF YOU CHECKED OFF ANY PROBLEMS, HOW DIFFICULT HAVE THESE PROBLEMS MADE IT FOR YOU TO DO YOUR WORK, TAKE CARE OF THINGS AT HOME, OR GET ALONG WITH OTHER PEOPLE: 0
3. TROUBLE FALLING OR STAYING ASLEEP: 1
SUM OF ALL RESPONSES TO PHQ QUESTIONS 1-9: 1
7. TROUBLE CONCENTRATING ON THINGS, SUCH AS READING THE NEWSPAPER OR WATCHING TELEVISION: 0
6. FEELING BAD ABOUT YOURSELF - OR THAT YOU ARE A FAILURE OR HAVE LET YOURSELF OR YOUR FAMILY DOWN: 0

## 2024-01-09 NOTE — PROGRESS NOTES
agreement was voiced with all above plans. All questions answered to satisfaction.   Encouraged healthy diet and exercise.   Call office with any questions or new or worsening symptoms or concerns.     Return in 1 year (on 1/9/2025), or if symptoms worsen or fail to improve, for Annual Visit/Physical.            Electronically signed by TRAMAINE MONTERO CNP on 1/10/2024 at 6:32 PM    Note is dictated utilizing voice recognition software. Unfortunately this leads to occasional typographical errors. Please contact our office if you have any questions.

## 2024-01-10 ENCOUNTER — TELEPHONE (OUTPATIENT)
Dept: OBGYN CLINIC | Age: 29
End: 2024-01-10

## 2024-01-10 LAB
MICROORGANISM SPEC CULT: NORMAL
SPECIMEN DESCRIPTION: NORMAL

## 2024-01-10 NOTE — TELEPHONE ENCOUNTER
10 wks    Pt called in asking since she will be taking a baby asprin starting at 12 weeks that her bariatric doctor would like her to start a PPI as well and they are wondering which PPI medication would be ok to take while pregnant?    Pls advise

## 2024-01-11 NOTE — TELEPHONE ENCOUNTER
Pt also asking why she is not able to do the 1 hr glucose? She was told that since she is a bariatric surgery pt that she will just have to monitor her glucose and unable to do the 1 hr. She also stated that her bariatric doctor was not sure on why she couldn't do it either.     Pls advise

## 2024-01-15 ENCOUNTER — TELEPHONE (OUTPATIENT)
Dept: OBGYN CLINIC | Age: 29
End: 2024-01-15

## 2024-01-15 NOTE — TELEPHONE ENCOUNTER
Pt had a confirmation appt on 1/5.     Message from pt who then called.   I’m still having some slight discomfort but not to bad I did see my primary care and it does look like I do have kidney stones and did have abdominal pain on the left side due to constipation , but I’m having some soreness in my abdomen.i did have some concerns Friday because my morning sickness and breast tenderness has decreased I still have some slight sickness but not as much . I did slightly freak Friday and went to [facility] and had a ultrasound done and it was moving and all and looked to be growing at a good rate if you could please have either Camelia or Dr. Lechuga call me I would appreciate it , I know my next ultrasound is next week but I would like to speak with one of them prior     Pt states she has a hx of infertility in the past and keeps being told by ppl that has been pregnant that she should feel this way and that way and she is not feeling any of those ways so feel something is wrong and that she is freaking out. I explained to her everyone is different and feels different ways when pregnant. Pt is just asking if she can have an HCG ordered to put her mind at ease.     Pls advise.

## 2024-01-16 NOTE — TELEPHONE ENCOUNTER
From: Siddharth Fermin  To: Camelia Dominguez  Sent: 1/8/2024 5:25 PM EST  Subject: Slight abdominal pain     Hello I saw you sent in the script for the yeast infection , thank you for sending that . I’m having some slight adominal pain in mid abdominal area near my naval area and across , I did have some slight sharp pains prior that I thought was due to constipation . Is there concerns for this? And is there anything that can be prescribed to help prevent miscarriages like progestrone? I’m not having much cramping and if I do drinking the water helped it’s just that abdominal pain that I concerned about

## 2024-01-23 ENCOUNTER — INITIAL PRENATAL (OUTPATIENT)
Dept: OBGYN CLINIC | Age: 29
End: 2024-01-23

## 2024-01-23 VITALS
HEIGHT: 57 IN | BODY MASS INDEX: 38.4 KG/M2 | WEIGHT: 178 LBS | DIASTOLIC BLOOD PRESSURE: 84 MMHG | SYSTOLIC BLOOD PRESSURE: 124 MMHG

## 2024-01-23 DIAGNOSIS — E28.2 PCO (POLYCYSTIC OVARIES): ICD-10-CM

## 2024-01-23 DIAGNOSIS — Z3A.12 12 WEEKS GESTATION OF PREGNANCY: Primary | ICD-10-CM

## 2024-01-23 DIAGNOSIS — G43.101 MIGRAINE WITH AURA AND WITH STATUS MIGRAINOSUS, NOT INTRACTABLE: ICD-10-CM

## 2024-01-23 DIAGNOSIS — Z82.49 FAMILY HISTORY OF HYPERTENSION IN MOTHER: ICD-10-CM

## 2024-01-23 DIAGNOSIS — K21.9 GERD WITHOUT ESOPHAGITIS: ICD-10-CM

## 2024-01-23 DIAGNOSIS — Z87.42 HISTORY OF INFERTILITY: ICD-10-CM

## 2024-01-23 DIAGNOSIS — Z98.84 S/P LAPAROSCOPIC SLEEVE GASTRECTOMY: ICD-10-CM

## 2024-01-23 DIAGNOSIS — F41.1 GENERALIZED ANXIETY DISORDER: ICD-10-CM

## 2024-01-23 DIAGNOSIS — E55.9 VITAMIN D DEFICIENCY: ICD-10-CM

## 2024-01-23 PROCEDURE — 0500F INITIAL PRENATAL CARE VISIT: CPT | Performed by: OBSTETRICS & GYNECOLOGY

## 2024-01-23 RX ORDER — ASPIRIN 81 MG/1
81 TABLET, CHEWABLE ORAL DAILY
Qty: 90 TABLET | Refills: 0 | Status: SHIPPED | OUTPATIENT
Start: 2024-01-23 | End: 2024-04-22

## 2024-01-23 NOTE — PROGRESS NOTES
Counseling  (Include patient, FOB or anyone in either family)    1) Patient's age 35 years or > at ALVA: No  2) Thalassemia (Mediterranean, ): No  3) Neural Tube Defect:   No  4) Congenital heart defect:   No  5) Trisomy (e.g. Down Syndrome):  No  6) Jordon-sachs (Yazidi, Bhutanese Armenian, Cajun): No  7) Multiple Births:    No  8) Sickle cell (disease or trait):  No  9) Hemophilia or blood disorders:  No  10) Muscular Dystrophy:   No  11) Cystic Fibrosis:    No  12) Florentino's chorea:   No  13) Mental retardation/Autism :  No   If yes, was person tested for fragile X: No  14) Other inherited genetic/chromosomal disorder: Yes Pt has M.Cousin with type-1 diabetes.  15) Maternal metabolic disorder (DM, PKU): No  16) Child with birth defect not listed:  No  17) Recurrent pregnancy loss/stillbirth: No  18) Medications, supplements/illicit or   Recreational drugs/alcohol since LMP: No   List: none  19) Any other:   none    Comments/Counseling: Pt presents with MARTIN/Ramirez for their ACOG visit with nurse.   -POC referral to [facility] for 20wk anatomy scan and consult if indicated.   -POC pt to get her labs done tomorrow and her labs ordered by her surgeon (gastric sleeve). Pt declines all genetic testing. Plans pre-E labs and One hour also.   -POC evaluation per OB/Gyn provider if pt needs scheduled C/S due to hx of  fractured and repaired pelvis. Pt would like IUD at her delivery  or C/S if she has anesthesia onboard.   -POC scheduled OB visits for duration of pt pregnancy with pt present.  -------------------------------------------------------------------------  Infection History:    1) Live with someone with TB/exposed to TB: No  2) Patient/partner has h/o genital herpes: No  3) Rash/viral illness since LMP:  No  4) History of STD:    No  5) Other: No  -------------------------------------------------------------------------     Check list reviewed with New OB patient:    [facility]  -Delivery only at

## 2024-01-31 ENCOUNTER — APPOINTMENT (OUTPATIENT)
Dept: ULTRASOUND IMAGING | Age: 29
End: 2024-01-31
Payer: COMMERCIAL

## 2024-01-31 ENCOUNTER — HOSPITAL ENCOUNTER (EMERGENCY)
Age: 29
Discharge: HOME | End: 2024-01-31
Attending: STUDENT IN AN ORGANIZED HEALTH CARE EDUCATION/TRAINING PROGRAM
Payer: COMMERCIAL

## 2024-01-31 VITALS
HEIGHT: 57 IN | TEMPERATURE: 98.6 F | DIASTOLIC BLOOD PRESSURE: 91 MMHG | RESPIRATION RATE: 16 BRPM | HEART RATE: 65 BPM | BODY MASS INDEX: 38.52 KG/M2 | OXYGEN SATURATION: 100 % | SYSTOLIC BLOOD PRESSURE: 147 MMHG

## 2024-01-31 DIAGNOSIS — R10.13 ABDOMINAL PAIN, EPIGASTRIC: Primary | ICD-10-CM

## 2024-01-31 DIAGNOSIS — Z3A.13 13 WEEKS GESTATION OF PREGNANCY: ICD-10-CM

## 2024-01-31 LAB
ALBUMIN SERPL-MCNC: 4.1 G/DL (ref 3.5–5.2)
ALBUMIN/GLOB SERPL: 1.2 {RATIO} (ref 1–2.5)
ALP SERPL-CCNC: 70 U/L (ref 35–104)
ALT SERPL-CCNC: 8 U/L (ref 5–33)
ANION GAP SERPL CALCULATED.3IONS-SCNC: 11 MMOL/L (ref 9–17)
AST SERPL-CCNC: 13 U/L
BACTERIA URNS QL MICRO: ABNORMAL
BASOPHILS # BLD: 0 K/UL (ref 0–0.2)
BASOPHILS NFR BLD: 0 % (ref 0–2)
BILIRUB SERPL-MCNC: 0.6 MG/DL (ref 0.3–1.2)
BILIRUB UR QL STRIP: NEGATIVE
BUN SERPL-MCNC: 9 MG/DL (ref 6–20)
CALCIUM SERPL-MCNC: 9 MG/DL (ref 8.6–10.4)
CHLORIDE SERPL-SCNC: 101 MMOL/L (ref 98–107)
CLARITY UR: CLEAR
CO2 SERPL-SCNC: 23 MMOL/L (ref 20–31)
COLOR UR: YELLOW
CREAT SERPL-MCNC: <0.4 MG/DL (ref 0.5–0.9)
CRYSTALS URNS MICRO: ABNORMAL /HPF
D DIMER PPP FEU-MCNC: 0.77 UG/ML FEU
EOSINOPHIL # BLD: 0 K/UL (ref 0–0.4)
EOSINOPHILS RELATIVE PERCENT: 0 % (ref 1–4)
EPI CELLS #/AREA URNS HPF: ABNORMAL /HPF (ref 0–5)
ERYTHROCYTE [DISTWIDTH] IN BLOOD BY AUTOMATED COUNT: 13.8 % (ref 12.5–15.4)
GFR SERPL CREATININE-BSD FRML MDRD: ABNORMAL ML/MIN/1.73M2
GLUCOSE SERPL-MCNC: 79 MG/DL (ref 70–99)
GLUCOSE UR STRIP-MCNC: NEGATIVE MG/DL
HCT VFR BLD AUTO: 38.3 % (ref 36–46)
HGB BLD-MCNC: 13.2 G/DL (ref 12–16)
HGB UR QL STRIP.AUTO: NEGATIVE
KETONES UR STRIP-MCNC: NEGATIVE MG/DL
LEUKOCYTE ESTERASE UR QL STRIP: ABNORMAL
LIPASE SERPL-CCNC: 60 U/L (ref 13–60)
LYMPHOCYTES NFR BLD: 1.8 K/UL (ref 1–4.8)
LYMPHOCYTES RELATIVE PERCENT: 22 % (ref 24–44)
MCH RBC QN AUTO: 29.8 PG (ref 26–34)
MCHC RBC AUTO-ENTMCNC: 34.6 G/DL (ref 31–37)
MCV RBC AUTO: 86.2 FL (ref 80–100)
MONOCYTES NFR BLD: 0.5 K/UL (ref 0.1–1.2)
MONOCYTES NFR BLD: 6 % (ref 2–11)
NEUTROPHILS NFR BLD: 72 % (ref 36–66)
NEUTS SEG NFR BLD: 5.9 K/UL (ref 1.8–7.7)
NITRITE UR QL STRIP: NEGATIVE
PH UR STRIP: 6 [PH] (ref 5–8)
PLATELET # BLD AUTO: 263 K/UL (ref 140–450)
PMV BLD AUTO: 7.7 FL (ref 6–12)
POTASSIUM SERPL-SCNC: 3.4 MMOL/L (ref 3.7–5.3)
PROT SERPL-MCNC: 7.5 G/DL (ref 6.4–8.3)
PROT UR STRIP-MCNC: NEGATIVE MG/DL
RBC # BLD AUTO: 4.44 M/UL (ref 4–5.2)
RBC #/AREA URNS HPF: ABNORMAL /HPF (ref 0–2)
SODIUM SERPL-SCNC: 135 MMOL/L (ref 135–144)
SP GR UR STRIP: 1.01 (ref 1–1.03)
UROBILINOGEN UR STRIP-ACNC: NORMAL EU/DL (ref 0–1)
WBC #/AREA URNS HPF: ABNORMAL /HPF (ref 0–5)
WBC OTHER # BLD: 8.3 K/UL (ref 3.5–11)

## 2024-01-31 PROCEDURE — 2580000003 HC RX 258

## 2024-01-31 PROCEDURE — 85025 COMPLETE CBC W/AUTO DIFF WBC: CPT

## 2024-01-31 PROCEDURE — 36415 COLL VENOUS BLD VENIPUNCTURE: CPT

## 2024-01-31 PROCEDURE — 99284 EMERGENCY DEPT VISIT MOD MDM: CPT | Performed by: STUDENT IN AN ORGANIZED HEALTH CARE EDUCATION/TRAINING PROGRAM

## 2024-01-31 PROCEDURE — 85379 FIBRIN DEGRADATION QUANT: CPT

## 2024-01-31 PROCEDURE — 80053 COMPREHEN METABOLIC PANEL: CPT

## 2024-01-31 PROCEDURE — 87086 URINE CULTURE/COLONY COUNT: CPT

## 2024-01-31 PROCEDURE — 83690 ASSAY OF LIPASE: CPT

## 2024-01-31 PROCEDURE — 96360 HYDRATION IV INFUSION INIT: CPT

## 2024-01-31 PROCEDURE — 81001 URINALYSIS AUTO W/SCOPE: CPT

## 2024-01-31 PROCEDURE — 6370000000 HC RX 637 (ALT 250 FOR IP)

## 2024-01-31 PROCEDURE — 76801 OB US < 14 WKS SINGLE FETUS: CPT

## 2024-01-31 RX ORDER — POTASSIUM CHLORIDE 20 MEQ/1
20 TABLET, EXTENDED RELEASE ORAL ONCE
Status: COMPLETED | OUTPATIENT
Start: 2024-01-31 | End: 2024-01-31

## 2024-01-31 RX ORDER — 0.9 % SODIUM CHLORIDE 0.9 %
1000 INTRAVENOUS SOLUTION INTRAVENOUS ONCE
Status: DISCONTINUED | OUTPATIENT
Start: 2024-01-31 | End: 2024-01-31

## 2024-01-31 RX ORDER — 0.9 % SODIUM CHLORIDE 0.9 %
1000 INTRAVENOUS SOLUTION INTRAVENOUS ONCE
Status: COMPLETED | OUTPATIENT
Start: 2024-01-31 | End: 2024-01-31

## 2024-01-31 RX ADMIN — SODIUM CHLORIDE 1000 ML: 9 INJECTION, SOLUTION INTRAVENOUS at 19:21

## 2024-01-31 RX ADMIN — POTASSIUM CHLORIDE 20 MEQ: 1500 TABLET, EXTENDED RELEASE ORAL at 19:57

## 2024-01-31 ASSESSMENT — PAIN SCALES - GENERAL: PAINLEVEL_OUTOF10: 5

## 2024-01-31 ASSESSMENT — PAIN - FUNCTIONAL ASSESSMENT
PAIN_FUNCTIONAL_ASSESSMENT: 0-10
PAIN_FUNCTIONAL_ASSESSMENT: NONE - DENIES PAIN

## 2024-01-31 ASSESSMENT — PAIN DESCRIPTION - LOCATION: LOCATION: ABDOMEN

## 2024-01-31 NOTE — ED PROVIDER NOTES
[facility] EMERGENCY DEPARTMENT  EMERGENCY DEPARTMENT ENCOUNTER      Pt Name: Siddharth Fermin  MRN: [id number]  Birthdate 1995  Date of evaluation: 1/31/2024  Provider: Lucie Rick MD  7:24 PM    CHIEF COMPLAINT       Chief Complaint   Patient presents with    Abdominal Pain     13 weeks pregnant, states it radiated to groin, states she just feels like it keeps worsening          HISTORY OF PRESENT ILLNESS    Siddharth Fermin is a 28 y.o. female who presents to the emergency department for abdominal pain, sent her by OB to \"check for clot in stomach\"     HPI  -Patient has history of gastric sleeve surgery 12/5/2022, polycystic ovaries, GERD, anxiety, depression, migraine, fatigue.    This is patient's first pregnancy.   -1/2/2024 transvaginal ultrasound results: 9.3 weeks IUP with heart rate 175, corpus luteum seen on right ovary, left ovary unremarkable  -She has had morning sickness with this pregnancy, has been having heartburn, not taking anything for the heartburn or reflux.  Has had nausea and vomiting. (No nausea or vomiting today)    Patient has midline abdominal pain with epigastric pain and pain on sides of abdomen that radiates to groin.    -The groin pain started a few days ago.   -She has had the epigastric and abdominal pain pain for 3 to 4 weeks, it has been more prominent the last 2 weeks.   -Patient is concerned about getting an ultrasound today to be sure fetal heartbeat there is still.  She used a home Doppler  herself today and heard heart rate 130-135.  She called her OB who instructed her to come to the emergency department to rule out clot in her abdomen.   -No abnormal vaginal discharge, no vaginal bleeding,     -Patient states her fluid intake is good, normal bowel movements, normal urination  She regularly checks her blood sugars at home has been 80s to 90s in the morning.  -Patient states her obstetrician informed her she is high risk for pre-e, gestational

## 2024-02-01 ENCOUNTER — TELEPHONE (OUTPATIENT)
Dept: OBGYN CLINIC | Age: 29
End: 2024-02-01

## 2024-02-01 LAB
MICROORGANISM SPEC CULT: NORMAL
SPECIMEN DESCRIPTION: NORMAL

## 2024-02-01 NOTE — DISCHARGE INSTRUCTIONS
Ultrasound showed single intrauterine pregnancy with heart rate of 143-144, no acute problem.  Labs were unremarkable, similar to previous urinalysis and CBC, negative for concern for pulmonary embolism or blood clot.     Your abdominal pain is likely secondary to round ligament pain, pregnancy pains, and GERD/reflux/ indigestion, he also had mild hypokalemia and 120 mEq dose of oral potassium chloride given.     -Recommend you take Pepcid 20 mg twice daily  -Recommend taking Tylenol for pain no more than 3000 mg per 24 hours period.     Follow-up closely with your PCP-please schedule an appointment to see them for follow-up emergency department visit     Follow-up closely with your OB Dr. Kam-please schedule appointment to see them for follow-up emergency department visit.   Blood pressure elevated 147/91- recommend  follow up with OB and have blood pressure further evaluated. Also low potassium/hypokalemia may need follow up as well.     Return to ED if you have worsening symptoms, abnormal vaginal discharge, vaginal bleeding, severe nausea and vomiting, vomit with blood,  fevers, difficulty or problems urinating, hematuria, difficulty or problems with bowel movements, blood in stool, or any other concerning symptoms.

## 2024-02-01 NOTE — TELEPHONE ENCOUNTER
OB 13.1wk Pt calling with groin discomfort that started on Saturday now having intermittent stabbing pain mainly epigastric.    S/S:  -sat 01/27/24 c/o discomfort groin bilaterally and had pain behind Lt knee was having difficulty bending knee  -Sunday 01/28/24 H/A  -Monday 01-29-24 pain from groin was shooting upwards and used heating pain  -Tue 01-30 and 1-31 more painful sharp shooting pains both sides of abd but most of her pain was epigastric.   -Pt states, \" I just wanted to call to she if this is just round ligament pain\"  -today 01-31-24 sharp stabbing 7-8/10 intermittent few per hour.   -pt currently at work.    Advised:  -go to  ER to be evaluated, they can do imaging that is needed to see what maybe happening.  -pt asked if she can wait until her clinicals are done at 5 pm.   -advised as long as she goes right at 5 pm.     Pt verbalizes understanding.

## 2024-02-05 ENCOUNTER — HOSPITAL ENCOUNTER (OUTPATIENT)
Age: 29
Setting detail: SPECIMEN
Discharge: HOME | End: 2024-02-05

## 2024-02-05 DIAGNOSIS — Z98.84 S/P LAPAROSCOPIC SLEEVE GASTRECTOMY: ICD-10-CM

## 2024-02-05 DIAGNOSIS — K90.89 OTHER SPECIFIED INTESTINAL MALABSORPTION: ICD-10-CM

## 2024-02-05 DIAGNOSIS — K21.9 GASTROESOPHAGEAL REFLUX DISEASE WITHOUT ESOPHAGITIS: ICD-10-CM

## 2024-02-05 DIAGNOSIS — O16.1 ELEVATED BLOOD PRESSURE AFFECTING PREGNANCY IN FIRST TRIMESTER, ANTEPARTUM: ICD-10-CM

## 2024-02-05 DIAGNOSIS — E66.01 MORBID OBESITY (HCC): ICD-10-CM

## 2024-02-05 DIAGNOSIS — E66.01 MORBID OBESITY WITH BMI OF 50.0-59.9, ADULT (HCC): ICD-10-CM

## 2024-02-05 DIAGNOSIS — K21.9 GERD WITHOUT ESOPHAGITIS: ICD-10-CM

## 2024-02-05 DIAGNOSIS — E66.9 OBESITY (BMI 30-39.9): ICD-10-CM

## 2024-02-05 DIAGNOSIS — N92.6 MISSED MENSES: ICD-10-CM

## 2024-02-05 DIAGNOSIS — O10.919 CHRONIC HYPERTENSION AFFECTING PREGNANCY: ICD-10-CM

## 2024-02-05 DIAGNOSIS — R10.13 EPIGASTRIC PAIN: ICD-10-CM

## 2024-02-05 DIAGNOSIS — Z98.84 STATUS POST LAPAROSCOPIC SLEEVE GASTRECTOMY: ICD-10-CM

## 2024-02-05 LAB
25(OH)D3 SERPL-MCNC: 26.1 NG/ML
ABO + RH BLD: NORMAL
ALBUMIN SERPL-MCNC: 3.8 G/DL (ref 3.5–5.2)
ALBUMIN SERPL-MCNC: 3.8 G/DL (ref 3.5–5.2)
ALBUMIN/GLOB SERPL: 1.5 {RATIO} (ref 1–2.5)
ALBUMIN/GLOB SERPL: 1.6 {RATIO} (ref 1–2.5)
ALP SERPL-CCNC: 61 U/L (ref 35–104)
ALP SERPL-CCNC: 64 U/L (ref 35–104)
ALT SERPL-CCNC: 8 U/L (ref 5–33)
ALT SERPL-CCNC: 8 U/L (ref 5–33)
ANION GAP SERPL CALCULATED.3IONS-SCNC: 13 MMOL/L (ref 9–17)
ANION GAP SERPL CALCULATED.3IONS-SCNC: 15 MMOL/L (ref 9–17)
AST SERPL-CCNC: 12 U/L
AST SERPL-CCNC: 12 U/L
BASOPHILS # BLD: <0.03 K/UL (ref 0–0.2)
BASOPHILS NFR BLD: 0 % (ref 0–2)
BILIRUB SERPL-MCNC: 0.6 MG/DL (ref 0.3–1.2)
BILIRUB SERPL-MCNC: 0.6 MG/DL (ref 0.3–1.2)
BLOOD GROUP ANTIBODIES SERPL: NEGATIVE
BUN SERPL-MCNC: 10 MG/DL (ref 6–20)
BUN SERPL-MCNC: 9 MG/DL (ref 6–20)
CALCIUM SERPL-MCNC: 8.5 MG/DL (ref 8.6–10.4)
CALCIUM SERPL-MCNC: 8.8 MG/DL (ref 8.6–10.4)
CHLORIDE SERPL-SCNC: 103 MMOL/L (ref 98–107)
CHLORIDE SERPL-SCNC: 105 MMOL/L (ref 98–107)
CO2 SERPL-SCNC: 19 MMOL/L (ref 20–31)
CO2 SERPL-SCNC: 20 MMOL/L (ref 20–31)
CREAT SERPL-MCNC: 0.3 MG/DL (ref 0.5–0.9)
CREAT SERPL-MCNC: 0.4 MG/DL (ref 0.5–0.9)
EOSINOPHIL # BLD: 0.04 K/UL (ref 0–0.44)
EOSINOPHILS RELATIVE PERCENT: 1 % (ref 1–4)
ERYTHROCYTE [DISTWIDTH] IN BLOOD BY AUTOMATED COUNT: 13.1 % (ref 11.8–14.4)
FERRITIN SERPL-MCNC: 29 NG/ML (ref 13–150)
FOLATE SERPL-MCNC: 19.6 NG/ML
GFR SERPL CREATININE-BSD FRML MDRD: >60 ML/MIN/1.73M2
GFR SERPL CREATININE-BSD FRML MDRD: >60 ML/MIN/1.73M2
GLUCOSE SERPL-MCNC: 83 MG/DL (ref 70–99)
GLUCOSE SERPL-MCNC: 87 MG/DL (ref 70–99)
HCT VFR BLD AUTO: 36.4 % (ref 36.3–47.1)
HCV AB SERPL QL IA: NONREACTIVE
HGB BLD-MCNC: 12 G/DL (ref 11.9–15.1)
IMM GRANULOCYTES # BLD AUTO: <0.03 K/UL (ref 0–0.3)
IMM GRANULOCYTES NFR BLD: 0 %
IRON SATN MFR SERPL: 17 % (ref 20–55)
IRON SERPL-MCNC: 60 UG/DL (ref 37–145)
LYMPHOCYTES NFR BLD: 0.96 K/UL (ref 1.1–3.7)
LYMPHOCYTES RELATIVE PERCENT: 15 % (ref 24–43)
MAGNESIUM SERPL-MCNC: 1.9 MG/DL (ref 1.6–2.6)
MCH RBC QN AUTO: 29.6 PG (ref 25.2–33.5)
MCHC RBC AUTO-ENTMCNC: 33 G/DL (ref 28.4–34.8)
MCV RBC AUTO: 89.7 FL (ref 82.6–102.9)
MONOCYTES NFR BLD: 0.31 K/UL (ref 0.1–1.2)
MONOCYTES NFR BLD: 5 % (ref 3–12)
NEUTROPHILS NFR BLD: 79 % (ref 36–65)
NEUTS SEG NFR BLD: 5.09 K/UL (ref 1.5–8.1)
NRBC BLD-RTO: 0 PER 100 WBC
PLATELET # BLD AUTO: 207 K/UL (ref 138–453)
PMV BLD AUTO: 10.3 FL (ref 8.1–13.5)
POTASSIUM SERPL-SCNC: 3.7 MMOL/L (ref 3.7–5.3)
POTASSIUM SERPL-SCNC: 3.8 MMOL/L (ref 3.7–5.3)
PROT SERPL-MCNC: 6.2 G/DL (ref 6.4–8.3)
PROT SERPL-MCNC: 6.3 G/DL (ref 6.4–8.3)
PTH-INTACT SERPL-MCNC: 27 PG/ML (ref 14–72)
RBC # BLD AUTO: 4.06 M/UL (ref 3.95–5.11)
SODIUM SERPL-SCNC: 137 MMOL/L (ref 135–144)
SODIUM SERPL-SCNC: 138 MMOL/L (ref 135–144)
TIBC SERPL-MCNC: 361 UG/DL (ref 250–450)
TSH SERPL DL<=0.05 MIU/L-ACNC: 2.17 UIU/ML (ref 0.3–5)
UNSATURATED IRON BINDING CAPACITY: 301 UG/DL (ref 112–347)
VIT B12 SERPL-MCNC: 210 PG/ML (ref 232–1245)
WBC OTHER # BLD: 6.4 K/UL (ref 3.5–11.3)

## 2024-02-06 ENCOUNTER — HOSPITAL ENCOUNTER (OUTPATIENT)
Age: 29
Setting detail: SPECIMEN
Discharge: HOME | End: 2024-02-06

## 2024-02-06 ENCOUNTER — TELEPHONE (OUTPATIENT)
Dept: OBGYN CLINIC | Age: 29
End: 2024-02-06

## 2024-02-06 DIAGNOSIS — K90.89 OTHER SPECIFIED INTESTINAL MALABSORPTION: ICD-10-CM

## 2024-02-06 DIAGNOSIS — E66.01 MORBID OBESITY WITH BMI OF 50.0-59.9, ADULT (HCC): ICD-10-CM

## 2024-02-06 DIAGNOSIS — Z67.91 RH NEGATIVE STATE IN ANTEPARTUM PERIOD: Primary | ICD-10-CM

## 2024-02-06 DIAGNOSIS — E66.01 MORBID OBESITY (HCC): ICD-10-CM

## 2024-02-06 DIAGNOSIS — Z98.84 S/P LAPAROSCOPIC SLEEVE GASTRECTOMY: ICD-10-CM

## 2024-02-06 DIAGNOSIS — O10.919 CHRONIC HYPERTENSION AFFECTING PREGNANCY: ICD-10-CM

## 2024-02-06 DIAGNOSIS — K21.9 GASTROESOPHAGEAL REFLUX DISEASE WITHOUT ESOPHAGITIS: ICD-10-CM

## 2024-02-06 DIAGNOSIS — Z98.84 STATUS POST LAPAROSCOPIC SLEEVE GASTRECTOMY: ICD-10-CM

## 2024-02-06 DIAGNOSIS — E66.9 OBESITY (BMI 30-39.9): ICD-10-CM

## 2024-02-06 DIAGNOSIS — R10.13 EPIGASTRIC PAIN: ICD-10-CM

## 2024-02-06 DIAGNOSIS — K21.9 GERD WITHOUT ESOPHAGITIS: ICD-10-CM

## 2024-02-06 DIAGNOSIS — O26.899 RH NEGATIVE STATE IN ANTEPARTUM PERIOD: Primary | ICD-10-CM

## 2024-02-06 DIAGNOSIS — O16.1 ELEVATED BLOOD PRESSURE AFFECTING PREGNANCY IN FIRST TRIMESTER, ANTEPARTUM: ICD-10-CM

## 2024-02-06 LAB
BASOPHILS # BLD: <0.03 K/UL (ref 0–0.2)
BASOPHILS NFR BLD: 0 % (ref 0–2)
CHOLEST SERPL-MCNC: 157 MG/DL (ref 0–199)
CHOLESTEROL/HDL RATIO: 3
CREAT UR-MCNC: 54.8 MG/DL (ref 28–217)
EOSINOPHIL # BLD: 0.04 K/UL (ref 0–0.44)
EOSINOPHILS RELATIVE PERCENT: 1 % (ref 1–4)
ERYTHROCYTE [DISTWIDTH] IN BLOOD BY AUTOMATED COUNT: 13.1 % (ref 11.8–14.4)
HBV SURFACE AG SERPL QL IA: NONREACTIVE
HCT VFR BLD AUTO: 36.4 % (ref 36.3–47.1)
HDLC SERPL-MCNC: 62 MG/DL
HGB BLD-MCNC: 12 G/DL (ref 11.9–15.1)
IMM GRANULOCYTES # BLD AUTO: <0.03 K/UL (ref 0–0.3)
IMM GRANULOCYTES NFR BLD: 0 %
LDLC SERPL CALC-MCNC: 84 MG/DL (ref 0–100)
LYMPHOCYTES NFR BLD: 0.96 K/UL (ref 1.1–3.7)
LYMPHOCYTES RELATIVE PERCENT: 15 % (ref 24–43)
MCH RBC QN AUTO: 29.6 PG (ref 25.2–33.5)
MCHC RBC AUTO-ENTMCNC: 33 G/DL (ref 28.4–34.8)
MCV RBC AUTO: 89.7 FL (ref 82.6–102.9)
MONOCYTES NFR BLD: 0.31 K/UL (ref 0.1–1.2)
MONOCYTES NFR BLD: 5 % (ref 3–12)
NEUTROPHILS NFR BLD: 79 % (ref 36–65)
NEUTS SEG NFR BLD: 5.09 K/UL (ref 1.5–8.1)
NRBC BLD-RTO: 0 PER 100 WBC
PLATELET # BLD AUTO: 207 K/UL (ref 138–453)
PMV BLD AUTO: 10.3 FL (ref 8.1–13.5)
RBC # BLD AUTO: 4.06 M/UL (ref 3.95–5.11)
RUBV IGG SERPL QL IA: 144.6 IU/ML
T PALLIDUM AB SER QL IA: NONREACTIVE
TOTAL PROTEIN, URINE: 6 MG/DL
TRIGL SERPL-MCNC: 55 MG/DL
URINE TOTAL PROTEIN CREATININE RATIO: 0.11 (ref 0–0.2)
VLDLC SERPL CALC-MCNC: 11 MG/DL
WBC OTHER # BLD: 6.4 K/UL (ref 3.5–11.3)

## 2024-02-06 NOTE — TELEPHONE ENCOUNTER
LMP: 11.05.23    14 wks    Last Seen:01.23.24    Next Appt: 02.13.24    C/O  ER f/u 01.31.24     Pt wanted to confirm if she was good to see provider at upcoming appt, or if needed seen sooner this week? Pt stated she thinks the discomfort/pain is likely round ligament pain. She wanted to know if it could be the cyst? Pt had further questions about RH status and B12 vitamin.    Advised pt that likely nichols she is good until we see her on 02.13.24. That the cyst would likely resolve on it own but could discuss further w/ provider. That during her first prenatal her and the provider could discuss POC needed for vitamins and RH if rhogram shot is necessary.       Please Advise

## 2024-02-07 LAB — ZINC SERPL-MCNC: 81.6 UG/DL (ref 60–120)

## 2024-02-09 LAB
RETINYL PALMITATE: <0.02 MG/L (ref 0–0.1)
VIT B1 PYROPHOSHATE BLD-SCNC: 122 NMOL/L (ref 70–180)
VITAMIN A LEVEL: 0.53 MG/L (ref 0.3–1.2)
VITAMIN A, INTERP: NORMAL

## 2024-02-13 ENCOUNTER — ROUTINE PRENATAL (OUTPATIENT)
Dept: OBGYN CLINIC | Age: 29
End: 2024-02-13

## 2024-02-13 VITALS
WEIGHT: 175 LBS | HEART RATE: 84 BPM | BODY MASS INDEX: 37.87 KG/M2 | SYSTOLIC BLOOD PRESSURE: 141 MMHG | DIASTOLIC BLOOD PRESSURE: 90 MMHG

## 2024-02-13 DIAGNOSIS — Z3A.15 15 WEEKS GESTATION OF PREGNANCY: Primary | ICD-10-CM

## 2024-02-13 DIAGNOSIS — O28.0 LOW MATERNAL SERUM VITAMIN B12: ICD-10-CM

## 2024-02-13 DIAGNOSIS — O10.919 CHRONIC HYPERTENSION AFFECTING PREGNANCY: ICD-10-CM

## 2024-02-13 DIAGNOSIS — O09.92 SUPERVISION OF HIGH RISK PREGNANCY IN SECOND TRIMESTER: ICD-10-CM

## 2024-02-13 PROBLEM — L21.9 SEBORRHEIC DERMATITIS OF SCALP: Status: RESOLVED | Noted: 2020-12-22 | Resolved: 2024-02-13

## 2024-02-13 PROCEDURE — 0502F SUBSEQUENT PRENATAL CARE: CPT | Performed by: OBSTETRICS & GYNECOLOGY

## 2024-02-13 RX ORDER — FAMOTIDINE 40 MG/1
40 TABLET, FILM COATED ORAL EVERY EVENING
Qty: 30 TABLET | Refills: 3 | Status: SHIPPED | OUTPATIENT
Start: 2024-02-13

## 2024-02-13 RX ORDER — CYANOCOBALAMIN 1000 UG/ML
1000 INJECTION, SOLUTION INTRAMUSCULAR; SUBCUTANEOUS
Qty: 1 ML | Refills: 2 | Status: SHIPPED | OUTPATIENT
Start: 2024-02-13

## 2024-02-13 NOTE — PROGRESS NOTES
Siddharth is a  @ 15w0d who presents for JASON visit.  She denies LOF, VB or Ctxs.  - FM.  She was seen in the ER for round ligament pain and had elevated BPs.  She is having some vaginal pain.  She is still having nausea in the morning, but not as bad.  She is having HAs daily.  She denies any fevers/chills, SOB, cough, sore throat, loss of taste/smell or sick contacts.  Pt denies any vision changes or RUQ pain.     O:  Vitals:    24 1540   BP: (!) 141/90   Pulse: 84     Gen: NAD  Abd: soft, nontender, gravid  Ext:  no edema      BP: (!) 141/90  Weight - Scale: 79.4 kg (175 lb)  Pulse: 84  Patient Position: Sitting  Fetal HR: 145  Movement: Absent    A/P:  Patient Active Problem List    Diagnosis Date Noted    Morbid obesity (HCC) 2022     Priority: Medium    S/P laparoscopic sleeve gastrectomy 2022     Priority: Medium    Vitamin D deficiency 2022     Priority: Medium    Migraine with aura 2021     Priority: Medium    Lumbar radiculopathy 2020     Priority: Medium    Fatigue 10/07/2020     Priority: Medium    Generalized anxiety disorder 2017     Priority: Medium    CHTN - no meds 2024    Rh negative state in antepartum period 2024    Moderate episode of recurrent major depressive disorder (HCC) 2024    Depression 2023    GERD without esophagitis     Seborrheic dermatitis of scalp 2020    PCO (polycystic ovaries) 10/17/2019    Lipoma 10/25/2016    Brain concussion 2012    Closed fracture of humerus 2012    Closed fracture of pelvis (HCC) 2012    Closed fracture of vertebra 2012    Dysmenorrhea 2012    Irregular periods 2012     - Discussed updated COVID precautions and policies. Reviewed updated visitor policy. Encouraged social distancing and appropriate hand washing/hygiene practices. Reviewed symptoms suspicious for COVID infection. Discussed that ACOG, SMFM, and the CDC recommend to not withold

## 2024-02-14 ENCOUNTER — TELEPHONE (OUTPATIENT)
Dept: BARIATRICS/WEIGHT MGMT | Age: 29
End: 2024-02-14

## 2024-02-14 NOTE — TELEPHONE ENCOUNTER
----- Message from Nathan Layne DO sent at 2/13/2024  3:00 PM EST -----  Patient pregnant, please have her call OB and see which B12 they would like her to take and also her vitamin D is low, what vitamin D supplement would they prefer    Thanks    Please document patient received this information regarding B12

## 2024-02-14 NOTE — TELEPHONE ENCOUNTER
Patient will be getting b12 injections through her OB/GYN and she will ask about her vit d deficiency with her ob.

## 2024-03-06 ENCOUNTER — TELEPHONE (OUTPATIENT)
Dept: OBGYN CLINIC | Age: 29
End: 2024-03-06

## 2024-03-06 NOTE — TELEPHONE ENCOUNTER
LMP: 03.12.24    Last Seen:02.13.24    Next Appt: 03.12.24    C/O  Sore throat   Stuffy nose    Pt called to check which medications are safe and if she is able to drink [employer] drink medicine ball.     Advised pt that of appropriate medications from office list. That recommendations is she should only have 16oz a day of caffeine. That the teas are acceptable with in mind caffeine intake.       Please Advise

## 2024-03-07 ENCOUNTER — OFFICE VISIT (OUTPATIENT)
Dept: FAMILY MEDICINE CLINIC | Age: 29
End: 2024-03-07
Payer: COMMERCIAL

## 2024-03-07 VITALS
SYSTOLIC BLOOD PRESSURE: 122 MMHG | RESPIRATION RATE: 14 BRPM | HEART RATE: 91 BPM | WEIGHT: 176 LBS | BODY MASS INDEX: 38.09 KG/M2 | DIASTOLIC BLOOD PRESSURE: 76 MMHG | OXYGEN SATURATION: 99 % | TEMPERATURE: 98.3 F

## 2024-03-07 DIAGNOSIS — H66.002 NON-RECURRENT ACUTE SUPPURATIVE OTITIS MEDIA OF LEFT EAR WITHOUT SPONTANEOUS RUPTURE OF TYMPANIC MEMBRANE: Primary | ICD-10-CM

## 2024-03-07 DIAGNOSIS — Z3A.18 18 WEEKS GESTATION OF PREGNANCY: ICD-10-CM

## 2024-03-07 PROCEDURE — 99213 OFFICE O/P EST LOW 20 MIN: CPT | Performed by: NURSE PRACTITIONER

## 2024-03-07 RX ORDER — AMOXICILLIN 500 MG/1
500 CAPSULE ORAL 3 TIMES DAILY
Qty: 21 CAPSULE | Refills: 0 | Status: SHIPPED | OUTPATIENT
Start: 2024-03-07 | End: 2024-03-14

## 2024-03-07 ASSESSMENT — ENCOUNTER SYMPTOMS
WHEEZING: 0
COUGH: 0
SHORTNESS OF BREATH: 0
SWOLLEN GLANDS: 0
STRIDOR: 0
SORE THROAT: 0
HOARSE VOICE: 0
SINUS PRESSURE: 1
CHOKING: 0
CHEST TIGHTNESS: 0
SINUS COMPLAINT: 1

## 2024-03-07 NOTE — PATIENT INSTRUCTIONS
Cool mist humidifier bedside  Increase water intake  Follow list of acceptable medications for cold symptoms management provided by Dr. De's office

## 2024-03-07 NOTE — PROGRESS NOTES
11/05/2023 (Approximate)   SpO2 99%   BMI 38.09 kg/m²     Assessment:       Diagnosis Orders   1. Non-recurrent acute suppurative otitis media of left ear without spontaneous rupture of tympanic membrane        2. 18 weeks gestation of pregnancy            Plan:    18 week pregnant female with uri sx 3 days, no fevers, works as MA in Peds  Declined covid and flu testing  Cool mist humidifier bedside  Saline nasal spray  Neti pot  Amoxil rx for start of left OM  Increase water intake  Follow list of acceptable medications for cold symptoms management provided by Dr. De's office  Return worse  Pt verbalized agreement and understanding of POC    Return for make appt with Family Doc in 2 weeks for ear check.    Orders Placed This Encounter   Medications    amoxicillin (AMOXIL) 500 MG capsule     Sig: Take 1 capsule by mouth 3 times daily for 7 days     Dispense:  21 capsule     Refill:  0         Patient given educational materials - see patient instructions.  Discussed use, benefit, and side effects of prescribed medications.  All patient questions answered.  Pt voicedunderstanding.    Electronically signed by TRAMAINE Crane CNP on 3/7/2024 at 5:38 PM

## 2024-03-11 ENCOUNTER — TELEPHONE (OUTPATIENT)
Dept: OBGYN CLINIC | Age: 29
End: 2024-03-11

## 2024-03-11 ENCOUNTER — HOSPITAL ENCOUNTER (EMERGENCY)
Age: 29
Discharge: HOME | End: 2024-03-11
Attending: EMERGENCY MEDICINE
Payer: COMMERCIAL

## 2024-03-11 VITALS
DIASTOLIC BLOOD PRESSURE: 66 MMHG | SYSTOLIC BLOOD PRESSURE: 128 MMHG | WEIGHT: 176.26 LBS | BODY MASS INDEX: 38.14 KG/M2 | TEMPERATURE: 98.1 F | HEART RATE: 88 BPM | RESPIRATION RATE: 19 BRPM | OXYGEN SATURATION: 99 %

## 2024-03-11 DIAGNOSIS — N30.00 ACUTE CYSTITIS WITHOUT HEMATURIA: Primary | ICD-10-CM

## 2024-03-11 LAB
ALBUMIN SERPL-MCNC: 3.9 G/DL (ref 3.5–5.2)
ALBUMIN/GLOB SERPL: 1 {RATIO} (ref 1–2.5)
ALP SERPL-CCNC: 83 U/L (ref 35–104)
ALT SERPL-CCNC: 13 U/L (ref 10–35)
AMORPH SED URNS QL MICRO: ABNORMAL
ANION GAP SERPL CALCULATED.3IONS-SCNC: 12 MMOL/L (ref 9–16)
AST SERPL-CCNC: 22 U/L (ref 10–35)
BACTERIA URNS QL MICRO: ABNORMAL
BILIRUB SERPL-MCNC: 0.7 MG/DL (ref 0–1.2)
BILIRUB UR QL STRIP: NEGATIVE
BUN SERPL-MCNC: 10 MG/DL (ref 6–20)
CALCIUM SERPL-MCNC: 9.2 MG/DL (ref 8.6–10.4)
CANDIDA SPECIES: NEGATIVE
CASTS #/AREA URNS LPF: ABNORMAL /LPF (ref 0–8)
CHLORIDE SERPL-SCNC: 103 MMOL/L (ref 98–107)
CLARITY UR: ABNORMAL
CO2 SERPL-SCNC: 22 MMOL/L (ref 20–31)
COLOR UR: YELLOW
CREAT SERPL-MCNC: 0.4 MG/DL (ref 0.5–0.9)
CRYSTALS URNS MICRO: ABNORMAL /HPF
EPI CELLS #/AREA URNS HPF: ABNORMAL /HPF (ref 0–5)
ERYTHROCYTE [DISTWIDTH] IN BLOOD BY AUTOMATED COUNT: 12.4 % (ref 11.8–14.4)
GARDNERELLA VAGINALIS: NEGATIVE
GFR SERPL CREATININE-BSD FRML MDRD: >60 ML/MIN/1.73M2
GLUCOSE SERPL-MCNC: 68 MG/DL (ref 74–99)
GLUCOSE UR STRIP-MCNC: NEGATIVE MG/DL
HCT VFR BLD AUTO: 36.9 % (ref 36.3–47.1)
HGB BLD-MCNC: 12.8 G/DL (ref 11.9–15.1)
HGB UR QL STRIP.AUTO: NEGATIVE
KETONES UR STRIP-MCNC: ABNORMAL MG/DL
LEUKOCYTE ESTERASE UR QL STRIP: ABNORMAL
MAGNESIUM SERPL-MCNC: 1.9 MG/DL (ref 1.6–2.6)
MCH RBC QN AUTO: 30.7 PG (ref 25.2–33.5)
MCHC RBC AUTO-ENTMCNC: 34.7 G/DL (ref 28.4–34.8)
MCV RBC AUTO: 88.5 FL (ref 82.6–102.9)
MUCOUS THREADS URNS QL MICRO: ABNORMAL
NITRITE UR QL STRIP: NEGATIVE
NRBC BLD-RTO: 0 PER 100 WBC
PH UR STRIP: 6.5 [PH] (ref 5–8)
PLATELET # BLD AUTO: 227 K/UL (ref 138–453)
PMV BLD AUTO: 9.9 FL (ref 8.1–13.5)
POTASSIUM SERPL-SCNC: 3.5 MMOL/L (ref 3.7–5.3)
PROT SERPL-MCNC: 7.1 G/DL (ref 6.6–8.7)
PROT UR STRIP-MCNC: NEGATIVE MG/DL
RBC # BLD AUTO: 4.17 M/UL (ref 3.95–5.11)
RBC #/AREA URNS HPF: ABNORMAL /HPF (ref 0–4)
SODIUM SERPL-SCNC: 137 MMOL/L (ref 136–145)
SOURCE: NORMAL
SP GR UR STRIP: 1.03 (ref 1–1.03)
TRICHOMONAS: NEGATIVE
UROBILINOGEN UR STRIP-ACNC: NORMAL EU/DL (ref 0–1)
WBC #/AREA URNS HPF: ABNORMAL /HPF (ref 0–5)
WBC OTHER # BLD: 10.6 K/UL (ref 3.5–11.3)

## 2024-03-11 PROCEDURE — 83735 ASSAY OF MAGNESIUM: CPT

## 2024-03-11 PROCEDURE — 80053 COMPREHEN METABOLIC PANEL: CPT

## 2024-03-11 PROCEDURE — 85027 COMPLETE CBC AUTOMATED: CPT

## 2024-03-11 PROCEDURE — 87510 GARDNER VAG DNA DIR PROBE: CPT

## 2024-03-11 PROCEDURE — 87480 CANDIDA DNA DIR PROBE: CPT

## 2024-03-11 PROCEDURE — 87660 TRICHOMONAS VAGIN DIR PROBE: CPT

## 2024-03-11 PROCEDURE — 81001 URINALYSIS AUTO W/SCOPE: CPT

## 2024-03-11 PROCEDURE — 87086 URINE CULTURE/COLONY COUNT: CPT

## 2024-03-11 PROCEDURE — 99283 EMERGENCY DEPT VISIT LOW MDM: CPT

## 2024-03-11 PROCEDURE — 87491 CHLMYD TRACH DNA AMP PROBE: CPT

## 2024-03-11 PROCEDURE — 87591 N.GONORRHOEAE DNA AMP PROB: CPT

## 2024-03-11 PROCEDURE — 6370000000 HC RX 637 (ALT 250 FOR IP): Performed by: STUDENT IN AN ORGANIZED HEALTH CARE EDUCATION/TRAINING PROGRAM

## 2024-03-11 RX ORDER — NITROFURANTOIN 25; 75 MG/1; MG/1
100 CAPSULE ORAL 2 TIMES DAILY
Qty: 10 CAPSULE | Refills: 0 | Status: SHIPPED | OUTPATIENT
Start: 2024-03-11 | End: 2024-03-16

## 2024-03-11 RX ORDER — POTASSIUM CHLORIDE 20 MEQ/1
40 TABLET, EXTENDED RELEASE ORAL ONCE
Status: COMPLETED | OUTPATIENT
Start: 2024-03-11 | End: 2024-03-11

## 2024-03-11 RX ADMIN — POTASSIUM CHLORIDE 40 MEQ: 1500 TABLET, EXTENDED RELEASE ORAL at 03:20

## 2024-03-11 ASSESSMENT — ENCOUNTER SYMPTOMS
ABDOMINAL PAIN: 0
SHORTNESS OF BREATH: 0
NAUSEA: 0
COUGH: 0
VOMITING: 0

## 2024-03-11 ASSESSMENT — PAIN DESCRIPTION - ORIENTATION: ORIENTATION: LOWER;MID

## 2024-03-11 ASSESSMENT — PAIN SCALES - GENERAL: PAINLEVEL_OUTOF10: 3

## 2024-03-11 ASSESSMENT — PAIN DESCRIPTION - LOCATION: LOCATION: ABDOMEN

## 2024-03-11 ASSESSMENT — PAIN - FUNCTIONAL ASSESSMENT: PAIN_FUNCTIONAL_ASSESSMENT: 0-10

## 2024-03-11 NOTE — ED PROVIDER NOTES
[facility] ED  Emergency Department Encounter  Emergency Medicine Resident     Pt Name:Siddharth Fermin  MRN: [id number]  Birthdate 1995  Date of evaluation: 3/11/24  PCP:  Miguelina Houston APRN - CNP  Note Started: 1:49 AM EDT      CHIEF COMPLAINT       Chief Complaint   Patient presents with    Abdominal Pain    Pregnancy Problem     Leaking clear fluid from vagina x2 days, 19 weeks pregnant         HISTORY OF PRESENT ILLNESS  (Location/Symptom, Timing/Onset, Context/Setting, Quality, Duration, Modifying Factors, Severity.)      Siddharth Fermin is a 28 y.o. female  who presents with urinary frequency, urinary urgency, vaginal discharge x 2 days.  States she is 19 weeks pregnant, has been taking prenatal vitamin and following with OB/GYN.  States she also has been dealing with a sinus infection and has headache for the last 3 days.  History of hypertension during pregnancy, however has been mild and with no concerning symptoms so was not started on any blood pressure medications per patient.  Patient states discharge is clear.  Denies any dysuria, hematuria, flank pain.  Denies fever, chills, chest pain, abdominal pain.  Endorses suprapubic abdominal pressure like discomfort that is not outright painful.  Endorses usual amount of nausea in the morning attributed to pregnancy.    PAST MEDICAL / SURGICAL / SOCIAL / FAMILY HISTORY      has a past medical history of ADHD (attention deficit hyperactivity disorder), Chronic kidney disease, Depression, Headache, HTN (hypertension), Infertility, female, Migraine, Obesity, PCOS (polycystic ovarian syndrome), and Wellness examination.       has a past surgical history that includes Upper gastrointestinal endoscopy (N/A, 2022); Sleeve Gastrectomy (N/A, 2022); and Sleeve Gastrectomy (N/A, 2022).      Social History     Socioeconomic History    Marital status:      Spouse name: Not on file    Number of children: Not

## 2024-03-11 NOTE — ED NOTES
The following labs were labeled with appropriate pt sticker and tubed to lab:     [] Blue     [x] Lavender   [] on ice  [x] Green/yellow  [] Green/black [] on ice  [] Grey  [] on ice  [] Yellow  [] Red  [] Pink  [] Type/ Screen  [] ABG  [] VBG    [] COVID-19 swab    [] Rapid  [] PCR  [] Flu swab  [] Peds Viral Panel     [x] Urine Sample  [] Fecal Sample  [] Pelvic Cultures  [] Blood Cultures  [] X 2  [] STREP Cultures  [] Wound Cultures

## 2024-03-11 NOTE — ED NOTES
Patient to ED via private auto for evaluation of clear fluid vaginal discharge and mid lower abdominal pain x2 days. Patient states she is 19 weeks pregnant (First pregnancy) Patient is unsure if the discharge is urine or amniotic. OB was notified and was instructed to come visit ED now to evaluate and assess. Patient has history of HTN, Bariatric procedure before pregnancy. A&OX4, ambulatory. Call light in reach. On full cardiac monitor. Plan of care on going.

## 2024-03-11 NOTE — ED PROVIDER NOTES
[facility] ED     Emergency Department     Faculty Attestation    I performed a history and physical examination of the patient and discussed management with the resident. I reviewed the resident’s note and agree with the documented findings and plan of care. Any areas of disagreement are noted on the chart. I was personally present for the key portions of any procedures. I have documented in the chart those procedures where I was not present during the key portions. I have reviewed the emergency nurses triage note. I agree with the chief complaint, past medical history, past surgical history, allergies, medications, social and family history as documented unless otherwise noted below. For Physician Assistant/ Nurse Practitioner cases/documentation I have personally evaluated this patient and have completed at least one if not all key elements of the E/M (history, physical exam, and MDM). Additional findings are as noted.    Note Started: 1:29 AM EDT    Patient here with lower abdominal pain increased urinary frequency vaginal discharge.  She is 19 weeks pregnant, has been following with OB throughout the pregnancy this is her first pregnancy.  Denies any headache no vision changes no upper abdominal pain.  On exam patient appears anxious but nontoxic chatting with her parents who provide independent history.  Abdomen soft appropriate gravid no focal tenderness on my exam.  No lower extremity edema equal 2+ reflexes at the knee bilaterally.  Will do bedside ultrasound labs urine pelvic exam possible OB consult      Critical Care     none    Moris Gotti MD, FACEP, FAAEM  Attending Emergency  Physician           Moris Gotti MD  03/11/24 1437

## 2024-03-11 NOTE — ED NOTES
Patient's blood sugar is 68 per lab. Dr Rubio instructed the writer to give something sweet to drink. 2 Apple juice with ice provided. Plan of care on going.

## 2024-03-11 NOTE — DISCHARGE INSTRUCTIONS
Be sure to go up with your OB/GYN and primary care provider.  Return to emergency department for any acutely worsening/changing symptoms or any other acute concerns.

## 2024-03-11 NOTE — TELEPHONE ENCOUNTER
OB 18.6wk Pt calling seen in ER yesterday and had cancelled appt for tomorrow but now that her BP have been staying elevated requesting to be seen this week.     Scheduled visit tomorrow. 03/12/24.

## 2024-03-12 ENCOUNTER — ROUTINE PRENATAL (OUTPATIENT)
Dept: OBGYN CLINIC | Age: 29
End: 2024-03-12

## 2024-03-12 VITALS — WEIGHT: 174.8 LBS | DIASTOLIC BLOOD PRESSURE: 85 MMHG | SYSTOLIC BLOOD PRESSURE: 138 MMHG | BODY MASS INDEX: 37.83 KG/M2

## 2024-03-12 DIAGNOSIS — Z3A.19 19 WEEKS GESTATION OF PREGNANCY: ICD-10-CM

## 2024-03-12 DIAGNOSIS — O09.92 SUPERVISION OF HIGH RISK PREGNANCY IN SECOND TRIMESTER: Primary | ICD-10-CM

## 2024-03-12 LAB
C TRACH DNA SPEC QL PROBE+SIG AMP: NEGATIVE
MICROORGANISM SPEC CULT: NORMAL
N GONORRHOEA DNA SPEC QL PROBE+SIG AMP: NEGATIVE
SPECIMEN DESCRIPTION: NORMAL
SPECIMEN DESCRIPTION: NORMAL

## 2024-03-12 PROCEDURE — 0502F SUBSEQUENT PRENATAL CARE: CPT | Performed by: OBSTETRICS & GYNECOLOGY

## 2024-03-12 NOTE — PROGRESS NOTES
washing/hygiene practices. Reviewed symptoms suspicious for COVID infection. Discussed that ACOG, SMFM, and the CDC recommend to not withold immunization in pregnant and breastfeeding women who meet criteria for receipt of the vaccine based on the ACIP recommended priority groups. All questions answered. Patient vocalized understanding.    - RSV vaccination (32-36 weeks): The patient was counseled on benefits to her baby if she receives the Pfizer RSV vaccine (Abrysvo) during pregnancy. She was informed that this vaccination is FDA approved for use during pregnancy. She will think about it and call local pharmacies       Anatomy scan with MFM next week  Discussed exercises for sciatic, how to treat round ligament pain  Discussed s/sx that should prompt call to the office  Discussed kick counts  Pt counseled to continue PNVs  RTC in 4 wks    Marge Lechuga MD

## 2024-03-19 ENCOUNTER — ROUTINE PRENATAL (OUTPATIENT)
Dept: PERINATAL CARE | Age: 29
End: 2024-03-19
Payer: COMMERCIAL

## 2024-03-19 ENCOUNTER — TELEPHONE (OUTPATIENT)
Dept: BARIATRICS/WEIGHT MGMT | Age: 29
End: 2024-03-19

## 2024-03-19 VITALS
SYSTOLIC BLOOD PRESSURE: 120 MMHG | RESPIRATION RATE: 16 BRPM | BODY MASS INDEX: 37.54 KG/M2 | HEART RATE: 80 BPM | HEIGHT: 57 IN | TEMPERATURE: 98.3 F | DIASTOLIC BLOOD PRESSURE: 82 MMHG | WEIGHT: 174 LBS

## 2024-03-19 DIAGNOSIS — Z3A.20 20 WEEKS GESTATION OF PREGNANCY: ICD-10-CM

## 2024-03-19 DIAGNOSIS — O99.212 OBESITY AFFECTING PREGNANCY IN SECOND TRIMESTER, UNSPECIFIED OBESITY TYPE: ICD-10-CM

## 2024-03-19 DIAGNOSIS — Z36.86 ENCOUNTER FOR SCREENING FOR RISK OF PRE-TERM LABOR: ICD-10-CM

## 2024-03-19 DIAGNOSIS — O99.842 PREGNANCY AFFECTED BY PREVIOUS BARIATRIC SURGERY, CURRENTLY IN SECOND TRIMESTER: ICD-10-CM

## 2024-03-19 DIAGNOSIS — O16.2 HYPERTENSION AFFECTING PREGNANCY IN SECOND TRIMESTER: Primary | ICD-10-CM

## 2024-03-19 PROCEDURE — 76811 OB US DETAILED SNGL FETUS: CPT | Performed by: OBSTETRICS & GYNECOLOGY

## 2024-03-19 PROCEDURE — 99203 OFFICE O/P NEW LOW 30 MIN: CPT | Performed by: OBSTETRICS & GYNECOLOGY

## 2024-03-19 PROCEDURE — 76817 TRANSVAGINAL US OBSTETRIC: CPT | Performed by: OBSTETRICS & GYNECOLOGY

## 2024-03-19 NOTE — TELEPHONE ENCOUNTER
Next Visit Date:  Visit date not found    Patient Surgery Date  12/05/22    Type of  Surgery  sleeve    Patient calls complaining of  20 weeks pregnant having off and on heartburn. Maternal Fetal wants her to take baby ASA daily for history of hypertension. Maternal fetal states heart burn might get worse with ASA. Should she be concerned. Patient states her heartburn is pretty much under control.  She is worried about an ulcer with the ASA - any suggestions Patient is taking protonix as needed.    Onset: 2 day(s) ago  Timing: constant, intermittent  Severity: Mild    Progression: stable    Associated Symptoms: heartburn off and on    Any allergy  To medications     NSAIDS    Current  Pharmacy   [facility]    Patient advised:   If  Symptoms worsen seek treatment at  Emergency Room.   You will receive a call back from the office within 24-48 hours.    Is it ok to leave a message if we call back yes

## 2024-03-21 ENCOUNTER — TELEPHONE (OUTPATIENT)
Dept: OBGYN CLINIC | Age: 29
End: 2024-03-21

## 2024-03-21 NOTE — TELEPHONE ENCOUNTER
20.2 wks    Pt calling in stating that she is having random dropping in her BS. Pt states she can be eating and start feeling weird and she checks her sugar and they drop to about 60 every time. Pt eats enough protein and nothing is helping. Pt states she snacks through out the day and no matter what she does to try to prevent it from dropping it is still dropping. Pt is asking what she needs to do.    Pls advise.

## 2024-04-08 ENCOUNTER — ROUTINE PRENATAL (OUTPATIENT)
Dept: OBGYN CLINIC | Age: 29
End: 2024-04-08

## 2024-04-08 VITALS
BODY MASS INDEX: 37.87 KG/M2 | WEIGHT: 175 LBS | DIASTOLIC BLOOD PRESSURE: 92 MMHG | SYSTOLIC BLOOD PRESSURE: 134 MMHG | HEART RATE: 81 BPM

## 2024-04-08 DIAGNOSIS — Z3A.22 22 WEEKS GESTATION OF PREGNANCY: Primary | ICD-10-CM

## 2024-04-08 DIAGNOSIS — O09.92 SUPERVISION OF HIGH RISK PREGNANCY IN SECOND TRIMESTER: ICD-10-CM

## 2024-04-08 PROCEDURE — 0502F SUBSEQUENT PRENATAL CARE: CPT | Performed by: OBSTETRICS & GYNECOLOGY

## 2024-04-17 ENCOUNTER — ROUTINE PRENATAL (OUTPATIENT)
Dept: PERINATAL CARE | Age: 29
End: 2024-04-17
Payer: COMMERCIAL

## 2024-04-17 VITALS
HEIGHT: 57 IN | BODY MASS INDEX: 38.76 KG/M2 | RESPIRATION RATE: 16 BRPM | DIASTOLIC BLOOD PRESSURE: 86 MMHG | HEART RATE: 80 BPM | WEIGHT: 179.68 LBS | SYSTOLIC BLOOD PRESSURE: 130 MMHG | TEMPERATURE: 98.4 F

## 2024-04-17 DIAGNOSIS — Z3A.24 24 WEEKS GESTATION OF PREGNANCY: ICD-10-CM

## 2024-04-17 DIAGNOSIS — O16.2 HYPERTENSION AFFECTING PREGNANCY IN SECOND TRIMESTER: Primary | ICD-10-CM

## 2024-04-17 DIAGNOSIS — Z36.4 ULTRASOUND FOR ANTENATAL SCREENING FOR FETAL GROWTH RESTRICTION: ICD-10-CM

## 2024-04-17 DIAGNOSIS — O99.842 PREGNANCY AFFECTED BY PREVIOUS BARIATRIC SURGERY, CURRENTLY IN SECOND TRIMESTER: ICD-10-CM

## 2024-04-17 DIAGNOSIS — O99.212 OBESITY AFFECTING PREGNANCY IN SECOND TRIMESTER, UNSPECIFIED OBESITY TYPE: ICD-10-CM

## 2024-04-17 PROCEDURE — 76820 UMBILICAL ARTERY ECHO: CPT | Performed by: OBSTETRICS & GYNECOLOGY

## 2024-04-17 PROCEDURE — 76816 OB US FOLLOW-UP PER FETUS: CPT | Performed by: OBSTETRICS & GYNECOLOGY

## 2024-05-05 ENCOUNTER — HOSPITAL ENCOUNTER (OUTPATIENT)
Age: 29
Discharge: HOME | End: 2024-05-05
Attending: OBSTETRICS & GYNECOLOGY | Admitting: OBSTETRICS & GYNECOLOGY
Payer: COMMERCIAL

## 2024-05-05 VITALS
DIASTOLIC BLOOD PRESSURE: 78 MMHG | SYSTOLIC BLOOD PRESSURE: 124 MMHG | RESPIRATION RATE: 16 BRPM | OXYGEN SATURATION: 97 % | HEART RATE: 77 BPM

## 2024-05-05 PROBLEM — Z3A.26 26 WEEKS GESTATION OF PREGNANCY: Status: ACTIVE | Noted: 2024-05-05

## 2024-05-05 LAB
ALBUMIN SERPL-MCNC: 3.2 G/DL (ref 3.5–5.2)
ALBUMIN/GLOB SERPL: 1 {RATIO} (ref 1–2.5)
ALP SERPL-CCNC: 109 U/L (ref 35–104)
ALT SERPL-CCNC: 18 U/L (ref 10–35)
ANION GAP SERPL CALCULATED.3IONS-SCNC: 10 MMOL/L (ref 9–16)
AST SERPL-CCNC: 22 U/L (ref 10–35)
BASOPHILS # BLD: 0.03 K/UL (ref 0–0.2)
BASOPHILS NFR BLD: 0 % (ref 0–2)
BILIRUB SERPL-MCNC: 0.4 MG/DL (ref 0–1.2)
BUN SERPL-MCNC: 9 MG/DL (ref 6–20)
CALCIUM SERPL-MCNC: 8.2 MG/DL (ref 8.6–10.4)
CHLORIDE SERPL-SCNC: 106 MMOL/L (ref 98–107)
CO2 SERPL-SCNC: 20 MMOL/L (ref 20–31)
CREAT SERPL-MCNC: 0.4 MG/DL (ref 0.5–0.9)
CREAT UR-MCNC: 94.3 MG/DL (ref 28–217)
EOSINOPHIL # BLD: 0.23 K/UL (ref 0–0.44)
EOSINOPHILS RELATIVE PERCENT: 3 % (ref 1–4)
ERYTHROCYTE [DISTWIDTH] IN BLOOD BY AUTOMATED COUNT: 12.6 % (ref 11.8–14.4)
GFR, ESTIMATED: >90 ML/MIN/1.73M2
GLUCOSE SERPL-MCNC: 64 MG/DL (ref 74–99)
HCT VFR BLD AUTO: 34.1 % (ref 36.3–47.1)
HGB BLD-MCNC: 11.6 G/DL (ref 11.9–15.1)
IMM GRANULOCYTES # BLD AUTO: 0.03 K/UL (ref 0–0.3)
IMM GRANULOCYTES NFR BLD: 0 %
LYMPHOCYTES NFR BLD: 0.98 K/UL (ref 1.1–3.7)
LYMPHOCYTES RELATIVE PERCENT: 12 % (ref 24–43)
MCH RBC QN AUTO: 29.8 PG (ref 25.2–33.5)
MCHC RBC AUTO-ENTMCNC: 34 G/DL (ref 28.4–34.8)
MCV RBC AUTO: 87.7 FL (ref 82.6–102.9)
MONOCYTES NFR BLD: 0.81 K/UL (ref 0.1–1.2)
MONOCYTES NFR BLD: 10 % (ref 3–12)
NEUTROPHILS NFR BLD: 75 % (ref 36–65)
NEUTS SEG NFR BLD: 6.14 K/UL (ref 1.5–8.1)
NRBC BLD-RTO: 0 PER 100 WBC
PLATELET # BLD AUTO: 202 K/UL (ref 138–453)
PMV BLD AUTO: 9.6 FL (ref 8.1–13.5)
POTASSIUM SERPL-SCNC: 3.6 MMOL/L (ref 3.7–5.3)
PROT SERPL-MCNC: 6.2 G/DL (ref 6.6–8.7)
RBC # BLD AUTO: 3.89 M/UL (ref 3.95–5.11)
SODIUM SERPL-SCNC: 136 MMOL/L (ref 136–145)
TOTAL PROTEIN, URINE: 11 MG/DL
URINE TOTAL PROTEIN CREATININE RATIO: 0.12
WBC OTHER # BLD: 8.2 K/UL (ref 3.5–11.3)

## 2024-05-05 PROCEDURE — 84156 ASSAY OF PROTEIN URINE: CPT

## 2024-05-05 PROCEDURE — 85025 COMPLETE CBC W/AUTO DIFF WBC: CPT

## 2024-05-05 PROCEDURE — 6370000000 HC RX 637 (ALT 250 FOR IP)

## 2024-05-05 PROCEDURE — 80053 COMPREHEN METABOLIC PANEL: CPT

## 2024-05-05 PROCEDURE — 82570 ASSAY OF URINE CREATININE: CPT

## 2024-05-05 PROCEDURE — 36415 COLL VENOUS BLD VENIPUNCTURE: CPT

## 2024-05-05 RX ORDER — ONDANSETRON 2 MG/ML
4 INJECTION INTRAMUSCULAR; INTRAVENOUS EVERY 6 HOURS PRN
Status: DISCONTINUED | OUTPATIENT
Start: 2024-05-05 | End: 2024-05-06 | Stop reason: HOSPADM

## 2024-05-05 RX ORDER — ONDANSETRON 4 MG/1
4 TABLET, ORALLY DISINTEGRATING ORAL EVERY 8 HOURS PRN
Status: DISCONTINUED | OUTPATIENT
Start: 2024-05-05 | End: 2024-05-06 | Stop reason: HOSPADM

## 2024-05-05 RX ORDER — LANOLIN ALCOHOL/MO/W.PET/CERES
400 CREAM (GRAM) TOPICAL ONCE
Status: COMPLETED | OUTPATIENT
Start: 2024-05-05 | End: 2024-05-05

## 2024-05-05 RX ORDER — ACETAMINOPHEN 500 MG
1000 TABLET ORAL ONCE
Status: COMPLETED | OUTPATIENT
Start: 2024-05-05 | End: 2024-05-05

## 2024-05-05 RX ADMIN — ACETAMINOPHEN 1000 MG: 500 TABLET ORAL at 22:11

## 2024-05-05 RX ADMIN — MAGNESIUM OXIDE 400 MG (241.3 MG MAGNESIUM) TABLET 400 MG: TABLET at 22:11

## 2024-05-05 ASSESSMENT — PAIN DESCRIPTION - LOCATION: LOCATION: HEAD

## 2024-05-05 ASSESSMENT — PAIN SCALES - GENERAL: PAINLEVEL_OUTOF10: 3

## 2024-05-05 ASSESSMENT — PAIN DESCRIPTION - DESCRIPTORS: DESCRIPTORS: DISCOMFORT;SORE

## 2024-05-06 ENCOUNTER — TELEPHONE (OUTPATIENT)
Dept: FAMILY MEDICINE CLINIC | Age: 29
End: 2024-05-06

## 2024-05-06 ENCOUNTER — ROUTINE PRENATAL (OUTPATIENT)
Dept: OBGYN CLINIC | Age: 29
End: 2024-05-06

## 2024-05-06 VITALS
WEIGHT: 181.8 LBS | BODY MASS INDEX: 39.34 KG/M2 | SYSTOLIC BLOOD PRESSURE: 152 MMHG | DIASTOLIC BLOOD PRESSURE: 106 MMHG

## 2024-05-06 DIAGNOSIS — Z3A.26 26 WEEKS GESTATION OF PREGNANCY: ICD-10-CM

## 2024-05-06 DIAGNOSIS — O09.92 SUPERVISION OF HIGH RISK PREGNANCY IN SECOND TRIMESTER: Primary | ICD-10-CM

## 2024-05-06 DIAGNOSIS — O10.919 CHRONIC HYPERTENSION AFFECTING PREGNANCY: ICD-10-CM

## 2024-05-06 DIAGNOSIS — R51.9 ACUTE INTRACTABLE HEADACHE, UNSPECIFIED HEADACHE TYPE: ICD-10-CM

## 2024-05-06 PROCEDURE — 0502F SUBSEQUENT PRENATAL CARE: CPT | Performed by: NURSE PRACTITIONER

## 2024-05-06 RX ORDER — METOCLOPRAMIDE 10 MG/1
10 TABLET ORAL
Qty: 20 TABLET | Refills: 0 | Status: SHIPPED | OUTPATIENT
Start: 2024-05-06

## 2024-05-06 NOTE — TELEPHONE ENCOUNTER
Pt is calling requesting to be seen for ER follow up for Hypertension. Pt states she is 27 weeks pregnant. Pt wants to know what you recommend?     Please advise and route to clinical pool

## 2024-05-06 NOTE — FLOWSHEET NOTE
Pt to L&D from ED ith reports of a headache and elevated blood pressure. This began in the morning today. Pt denies vaginal bleeding, loss of fluid, ctx. Pt reports fetal movement. EFM applied, FHT's 144.

## 2024-05-06 NOTE — H&P
OBSTETRICAL HISTORY AND PHYSICAL  [facility]    Date: 2024       Time: 10:15 PM   Patient Name: Siddharth Fermin     Patient : 1995  Room/Bed: REC3/REC3-01    Admission Date/Time: 2024  9:15 PM      CC: headache, nasal congestion, elevated BP at home    HPI: Siddharth Fermin is a 28 y.o.  at 26w5d who presents with elevated BP at home. She reports that she took her own BP with a manual cuff and got 160/110. Her mom then took her BP with the manual cuff and got 140/90. She reports that she has had a constant sinus infection with nasal congestion and has had a headache for 3-4 days intermittently. She was given Rx for magnesium oxide for headaches in pregnancy and patient does not take it. She denies other s/sx of preE.    The patient reports fetal movement is present, denies contractions, denies loss of fluid, denies vaginal bleeding.  She denies HA, vision changes, SOB, chest pain, lightheadedness, dizziness, nausea, vomiting, dysuria, and hematuria.     DATING:  LMP: Patient's last menstrual period was 2023 (approximate).  Estimated Date of Delivery: 24   Based on: LMP, early ultrasound, at 9 3/7 weeks GA    PREGNANCY RISK FACTORS:  Patient Active Problem List   Diagnosis    PCO (polycystic ovaries)    GERD without esophagitis    Fatigue    Generalized anxiety disorder    Lumbar radiculopathy    Migraine with aura    Vitamin D deficiency    S/P laparoscopic sleeve gastrectomy    Morbid obesity (HCC)    Brain concussion    Closed fracture of humerus    Closed fracture of pelvis (HCC)    Closed fracture of vertebra    Depression    Dysmenorrhea    Lipoma    Moderate episode of recurrent major depressive disorder (HCC)    Rh negative state in antepartum period    cHTN (no meds)    26 weeks gestation of pregnancy        Steroids Given In This Pregnancy:  no     REVIEW OF SYSTEMS:   Constitutional: negative fever, negative chills, negative weight

## 2024-05-15 ENCOUNTER — ROUTINE PRENATAL (OUTPATIENT)
Dept: PERINATAL CARE | Age: 29
End: 2024-05-15
Payer: COMMERCIAL

## 2024-05-15 VITALS
BODY MASS INDEX: 39.05 KG/M2 | TEMPERATURE: 98.5 F | WEIGHT: 181 LBS | RESPIRATION RATE: 16 BRPM | HEIGHT: 57 IN | SYSTOLIC BLOOD PRESSURE: 120 MMHG | HEART RATE: 103 BPM | DIASTOLIC BLOOD PRESSURE: 86 MMHG

## 2024-05-15 DIAGNOSIS — Z36.4 ULTRASOUND FOR ANTENATAL SCREENING FOR FETAL GROWTH RESTRICTION: ICD-10-CM

## 2024-05-15 DIAGNOSIS — R10.2 FEELING PELVIC PRESSURE IN PREGNANCY, ANTEPARTUM: ICD-10-CM

## 2024-05-15 DIAGNOSIS — Z3A.28 28 WEEKS GESTATION OF PREGNANCY: ICD-10-CM

## 2024-05-15 DIAGNOSIS — Z36.3 ENCOUNTER FOR ROUTINE SCREENING FOR MALFORMATION USING ULTRASONICS: ICD-10-CM

## 2024-05-15 DIAGNOSIS — O99.843 PREGNANCY AFFECTED BY PREVIOUS BARIATRIC SURGERY, CURRENTLY IN THIRD TRIMESTER: ICD-10-CM

## 2024-05-15 DIAGNOSIS — O16.3 HYPERTENSION AFFECTING PREGNANCY IN THIRD TRIMESTER: Primary | ICD-10-CM

## 2024-05-15 DIAGNOSIS — O26.899 FEELING PELVIC PRESSURE IN PREGNANCY, ANTEPARTUM: ICD-10-CM

## 2024-05-15 DIAGNOSIS — O99.213 OBESITY AFFECTING PREGNANCY IN THIRD TRIMESTER, UNSPECIFIED OBESITY TYPE: ICD-10-CM

## 2024-05-15 PROCEDURE — 99999 PR OFFICE/OUTPT VISIT,PROCEDURE ONLY: CPT | Performed by: OBSTETRICS & GYNECOLOGY

## 2024-05-15 PROCEDURE — 76805 OB US >/= 14 WKS SNGL FETUS: CPT | Performed by: OBSTETRICS & GYNECOLOGY

## 2024-05-15 PROCEDURE — 76817 TRANSVAGINAL US OBSTETRIC: CPT | Performed by: OBSTETRICS & GYNECOLOGY

## 2024-05-15 PROCEDURE — 76819 FETAL BIOPHYS PROFIL W/O NST: CPT | Performed by: OBSTETRICS & GYNECOLOGY

## 2024-05-15 PROCEDURE — 76820 UMBILICAL ARTERY ECHO: CPT | Performed by: OBSTETRICS & GYNECOLOGY

## 2024-05-21 NOTE — PROGRESS NOTES
Siddharth is a  @ 29w1d who presents for JASON visit.  She denies LOF, VB or Ctxs.  + FM.  She says that baby is very active one day and then not the next day.  She fell the other day off a chair but didn't hit her belly.  She denies any fevers/chills, SOB, cough, sore throat, loss of taste/smell or sick contacts.  Pt denies any HA, vision changes or RUQ pain.     O:  Vitals:    24 0804   BP: (!) 140/91   Pulse: 75     Gen: NAD  Abd: soft, nontender, gravid  Ext:  no edema      BP: (!) 140/91  Weight - Scale: 82.6 kg (182 lb)  Pulse: 75  Patient Position: Sitting  Fundal Height (cm): 29 cm  Fetal HR: 145  Movement: Present    A/P:  Patient Active Problem List    Diagnosis Date Noted    Morbid obesity (HCC) 2022     Priority: Medium    S/P laparoscopic sleeve gastrectomy 2022     Priority: Medium    Vitamin D deficiency 2022     Priority: Medium    Migraine with aura 2021     Priority: Medium    Lumbar radiculopathy 2020     Priority: Medium    Fatigue 10/07/2020     Priority: Medium    Generalized anxiety disorder 2017     Priority: Medium    26 weeks gestation of pregnancy 2024    cHTN (no meds) 2024    Rh negative state in antepartum period 2024    Moderate episode of recurrent major depressive disorder (HCC) 2024    Depression 2023    GERD without esophagitis     PCO (polycystic ovaries) 10/17/2019    Lipoma 10/25/2016    Brain concussion 2012    Closed fracture of humerus 2012    Closed fracture of pelvis (HCC) 2012     Desires primary .  Recommended previously by ortho       Closed fracture of vertebra 2012    Dysmenorrhea 2012     - Discussed updated COVID precautions and policies. Reviewed updated visitor policy. Encouraged social distancing and appropriate hand washing/hygiene practices. Reviewed symptoms suspicious for COVID infection. Discussed that ACOG, SMFM, and the CDC recommend to not withold

## 2024-05-22 ENCOUNTER — ROUTINE PRENATAL (OUTPATIENT)
Dept: OBGYN CLINIC | Age: 29
End: 2024-05-22
Payer: COMMERCIAL

## 2024-05-22 VITALS
DIASTOLIC BLOOD PRESSURE: 91 MMHG | HEART RATE: 75 BPM | WEIGHT: 182 LBS | BODY MASS INDEX: 39.38 KG/M2 | SYSTOLIC BLOOD PRESSURE: 140 MMHG

## 2024-05-22 DIAGNOSIS — Z67.91 RH NEGATIVE STATE IN ANTEPARTUM PERIOD: ICD-10-CM

## 2024-05-22 DIAGNOSIS — Z3A.29 29 WEEKS GESTATION OF PREGNANCY: Primary | ICD-10-CM

## 2024-05-22 DIAGNOSIS — O26.899 RH NEGATIVE STATE IN ANTEPARTUM PERIOD: ICD-10-CM

## 2024-05-22 DIAGNOSIS — O09.93 SUPERVISION OF HIGH RISK PREGNANCY IN THIRD TRIMESTER: ICD-10-CM

## 2024-05-22 PROCEDURE — 96372 THER/PROPH/DIAG INJ SC/IM: CPT | Performed by: OBSTETRICS & GYNECOLOGY

## 2024-05-22 PROCEDURE — 0502F SUBSEQUENT PRENATAL CARE: CPT | Performed by: OBSTETRICS & GYNECOLOGY

## 2024-05-22 NOTE — PROGRESS NOTES
After obtaining verbal consent, and per orders of Dr. Marge Lechuga, injection of RhoGam 1500 IU given in Right deltoid IM by Laila Beckman. Patient instructed to remain in clinic for 20 minutes afterwards, and to report any adverse reaction to me immediately.

## 2024-06-04 NOTE — PROGRESS NOTES
criteria for receipt of the vaccine based on the ACIP recommended priority groups. All questions answered. Patient vocalized understanding.    - RSV vaccination (32-36 weeks): The patient was counseled on benefits to her baby if she receives the Pfizer RSV vaccine (Abrysvo) during pregnancy. She was informed that this vaccination is FDA approved for use during pregnancy. She will think about it and call local pharmacies       Tdap at 33 wks per pt  Will schedule  at next visit  Discussed s/sx that should prompt call to the office  Discussed kick counts  Pt counseled to continue PNVs  RTC in 2 wks    Marge Lechuga MD

## 2024-06-05 ENCOUNTER — ROUTINE PRENATAL (OUTPATIENT)
Dept: OBGYN CLINIC | Age: 29
End: 2024-06-05

## 2024-06-05 VITALS
SYSTOLIC BLOOD PRESSURE: 131 MMHG | DIASTOLIC BLOOD PRESSURE: 90 MMHG | BODY MASS INDEX: 40.03 KG/M2 | HEART RATE: 75 BPM | WEIGHT: 185 LBS

## 2024-06-05 DIAGNOSIS — O09.93 SUPERVISION OF HIGH RISK PREGNANCY IN THIRD TRIMESTER: ICD-10-CM

## 2024-06-05 DIAGNOSIS — Z3A.31 31 WEEKS GESTATION OF PREGNANCY: Primary | ICD-10-CM

## 2024-06-05 PROCEDURE — 0502F SUBSEQUENT PRENATAL CARE: CPT | Performed by: OBSTETRICS & GYNECOLOGY

## 2024-06-12 ENCOUNTER — ROUTINE PRENATAL (OUTPATIENT)
Dept: PERINATAL CARE | Age: 29
End: 2024-06-12
Payer: COMMERCIAL

## 2024-06-12 VITALS
TEMPERATURE: 98 F | SYSTOLIC BLOOD PRESSURE: 136 MMHG | RESPIRATION RATE: 16 BRPM | BODY MASS INDEX: 42.05 KG/M2 | HEART RATE: 75 BPM | HEIGHT: 57 IN | WEIGHT: 194.89 LBS | DIASTOLIC BLOOD PRESSURE: 86 MMHG

## 2024-06-12 DIAGNOSIS — O99.213 OBESITY AFFECTING PREGNANCY IN THIRD TRIMESTER, UNSPECIFIED OBESITY TYPE: ICD-10-CM

## 2024-06-12 DIAGNOSIS — Z36.4 ULTRASOUND FOR ANTENATAL SCREENING FOR FETAL GROWTH RESTRICTION: ICD-10-CM

## 2024-06-12 DIAGNOSIS — O16.3 HYPERTENSION AFFECTING PREGNANCY IN THIRD TRIMESTER: ICD-10-CM

## 2024-06-12 DIAGNOSIS — O10.919 CHRONIC HYPERTENSION AFFECTING PREGNANCY: Primary | ICD-10-CM

## 2024-06-12 DIAGNOSIS — O99.843 PREGNANCY AFFECTED BY PREVIOUS BARIATRIC SURGERY, CURRENTLY IN THIRD TRIMESTER: ICD-10-CM

## 2024-06-12 DIAGNOSIS — Z3A.32 32 WEEKS GESTATION OF PREGNANCY: ICD-10-CM

## 2024-06-12 DIAGNOSIS — O36.5930 INTRAUTERINE GROWTH RESTRICTION (IUGR) AFFECTING CARE OF MOTHER, THIRD TRIMESTER, SINGLE GESTATION: Primary | ICD-10-CM

## 2024-06-12 PROCEDURE — 76821 MIDDLE CEREBRAL ARTERY ECHO: CPT | Performed by: OBSTETRICS & GYNECOLOGY

## 2024-06-12 PROCEDURE — 76816 OB US FOLLOW-UP PER FETUS: CPT | Performed by: OBSTETRICS & GYNECOLOGY

## 2024-06-12 PROCEDURE — 76819 FETAL BIOPHYS PROFIL W/O NST: CPT | Performed by: OBSTETRICS & GYNECOLOGY

## 2024-06-12 PROCEDURE — 76820 UMBILICAL ARTERY ECHO: CPT | Performed by: OBSTETRICS & GYNECOLOGY

## 2024-06-12 RX ORDER — LABETALOL 100 MG/1
200 TABLET, FILM COATED ORAL EVERY 12 HOURS SCHEDULED
Status: DISCONTINUED | OUTPATIENT
Start: 2024-06-12 | End: 2024-06-19 | Stop reason: HOSPADM

## 2024-06-12 NOTE — PROGRESS NOTES
Obstetric/Gynecology Maternal Fetal Medicine Resident Note    Resident in to evaluate patient with elevated non-severe BP with repeat normotensive. Patient has diagnosis of cHTN (not on medications). Denies signs or symptoms or preeclampsia at this time.    Patient informed of finding of FGR and amenable to consultation with MFM attending physician.    Dr. Leandro Damico is updated.    Lex Torres MD  OBGYN Resident, PGY1  [facility]  6/12/2024, 4:56 PM

## 2024-06-13 ENCOUNTER — TELEPHONE (OUTPATIENT)
Dept: OBGYN CLINIC | Age: 29
End: 2024-06-13

## 2024-06-13 DIAGNOSIS — O09.93 SUPERVISION OF HIGH RISK PREGNANCY IN THIRD TRIMESTER: ICD-10-CM

## 2024-06-13 DIAGNOSIS — O09.92 SUPERVISION OF HIGH RISK PREGNANCY IN SECOND TRIMESTER: ICD-10-CM

## 2024-06-13 DIAGNOSIS — Z3A.32 32 WEEKS GESTATION OF PREGNANCY: ICD-10-CM

## 2024-06-13 DIAGNOSIS — O36.5930 POOR FETAL GROWTH AFFECTING MANAGEMENT OF MOTHER IN THIRD TRIMESTER, SINGLE OR UNSPECIFIED FETUS: Primary | ICD-10-CM

## 2024-06-13 NOTE — TELEPHONE ENCOUNTER
32.2wks    Pt calling in with concerns as she went to [facility] and they diagnosed her with IUGR and loss of amniotic fluid and she is concerned with the loss of amniotic fluid. She is asking what next steps would be and what she should be looking for as far as the loss of fluid as she has been having symptoms of urgency to urinate with nothing or end up with leaking fluid as if she urinates on self a little bit. This is her first pregnancy and she dont want to do something wrong or not look out for symptoms that she should. She is just concerned and of course [facility] told her to consult with us for info.     Pls advise.

## 2024-06-17 ENCOUNTER — HOSPITAL ENCOUNTER (INPATIENT)
Age: 29
LOS: 1 days | Discharge: HOME | End: 2024-06-19
Attending: OBSTETRICS & GYNECOLOGY | Admitting: OBSTETRICS & GYNECOLOGY
Payer: COMMERCIAL

## 2024-06-17 ENCOUNTER — ROUTINE PRENATAL (OUTPATIENT)
Dept: PERINATAL CARE | Age: 29
End: 2024-06-17
Payer: COMMERCIAL

## 2024-06-17 VITALS
DIASTOLIC BLOOD PRESSURE: 90 MMHG | TEMPERATURE: 97.9 F | HEIGHT: 57 IN | SYSTOLIC BLOOD PRESSURE: 170 MMHG | BODY MASS INDEX: 41.85 KG/M2 | HEART RATE: 54 BPM | WEIGHT: 194 LBS | RESPIRATION RATE: 16 BRPM

## 2024-06-17 DIAGNOSIS — O99.213 OBESITY AFFECTING PREGNANCY IN THIRD TRIMESTER, UNSPECIFIED OBESITY TYPE: ICD-10-CM

## 2024-06-17 DIAGNOSIS — O16.3 HYPERTENSION AFFECTING PREGNANCY IN THIRD TRIMESTER: ICD-10-CM

## 2024-06-17 DIAGNOSIS — O36.5930 INTRAUTERINE GROWTH RESTRICTION (IUGR) AFFECTING CARE OF MOTHER, THIRD TRIMESTER, SINGLE GESTATION: Primary | ICD-10-CM

## 2024-06-17 DIAGNOSIS — O99.843 PREGNANCY AFFECTED BY PREVIOUS BARIATRIC SURGERY, CURRENTLY IN THIRD TRIMESTER: ICD-10-CM

## 2024-06-17 DIAGNOSIS — Z3A.32 32 WEEKS GESTATION OF PREGNANCY: ICD-10-CM

## 2024-06-17 PROBLEM — Z98.891 H/O CESAREAN SECTION: Status: ACTIVE | Noted: 2024-06-17

## 2024-06-17 PROBLEM — Z3A.26 26 WEEKS GESTATION OF PREGNANCY: Status: RESOLVED | Noted: 2024-05-05 | Resolved: 2024-06-17

## 2024-06-17 PROBLEM — R53.83 FATIGUE: Status: RESOLVED | Noted: 2020-10-07 | Resolved: 2024-06-17

## 2024-06-17 LAB
ALBUMIN SERPL-MCNC: 3.4 G/DL (ref 3.5–5.2)
ALBUMIN/GLOB SERPL: 1 {RATIO} (ref 1–2.5)
ALP SERPL-CCNC: 128 U/L (ref 35–104)
ALT SERPL-CCNC: 33 U/L (ref 10–35)
ANION GAP SERPL CALCULATED.3IONS-SCNC: 11 MMOL/L (ref 9–16)
AST SERPL-CCNC: 47 U/L (ref 10–35)
BILIRUB SERPL-MCNC: 0.7 MG/DL (ref 0–1.2)
BNP SERPL-MCNC: 410 PG/ML (ref 0–300)
BUN SERPL-MCNC: 13 MG/DL (ref 6–20)
CALCIUM SERPL-MCNC: 8.5 MG/DL (ref 8.6–10.4)
CHLORIDE SERPL-SCNC: 108 MMOL/L (ref 98–107)
CO2 SERPL-SCNC: 19 MMOL/L (ref 20–31)
CREAT SERPL-MCNC: 0.6 MG/DL (ref 0.5–0.9)
CREAT UR-MCNC: 219 MG/DL (ref 28–217)
ERYTHROCYTE [DISTWIDTH] IN BLOOD BY AUTOMATED COUNT: 12.8 % (ref 11.8–14.4)
GFR, ESTIMATED: >90 ML/MIN/1.73M2
GLUCOSE SERPL-MCNC: 95 MG/DL (ref 74–99)
HCT VFR BLD AUTO: 34.3 % (ref 36.3–47.1)
HGB BLD-MCNC: 11.5 G/DL (ref 11.9–15.1)
MCH RBC QN AUTO: 28.8 PG (ref 25.2–33.5)
MCHC RBC AUTO-ENTMCNC: 33.5 G/DL (ref 28.4–34.8)
MCV RBC AUTO: 85.8 FL (ref 82.6–102.9)
NRBC BLD-RTO: 0 PER 100 WBC
PLATELET # BLD AUTO: 227 K/UL (ref 138–453)
PMV BLD AUTO: 10.3 FL (ref 8.1–13.5)
POTASSIUM SERPL-SCNC: 3.6 MMOL/L (ref 3.7–5.3)
PROT SERPL-MCNC: 6.1 G/DL (ref 6.6–8.7)
RBC # BLD AUTO: 4 M/UL (ref 3.95–5.11)
SODIUM SERPL-SCNC: 138 MMOL/L (ref 136–145)
TOTAL PROTEIN, URINE: 103 MG/DL
TROPONIN I SERPL HS-MCNC: 7 NG/L (ref 0–14)
URINE TOTAL PROTEIN CREATININE RATIO: 0.47
WBC OTHER # BLD: 8.7 K/UL (ref 3.5–11.3)

## 2024-06-17 PROCEDURE — 93005 ELECTROCARDIOGRAM TRACING: CPT | Performed by: STUDENT IN AN ORGANIZED HEALTH CARE EDUCATION/TRAINING PROGRAM

## 2024-06-17 PROCEDURE — 2580000003 HC RX 258

## 2024-06-17 PROCEDURE — 6360000002 HC RX W HCPCS

## 2024-06-17 PROCEDURE — 6360000002 HC RX W HCPCS: Performed by: STUDENT IN AN ORGANIZED HEALTH CARE EDUCATION/TRAINING PROGRAM

## 2024-06-17 PROCEDURE — 6370000000 HC RX 637 (ALT 250 FOR IP): Performed by: STUDENT IN AN ORGANIZED HEALTH CARE EDUCATION/TRAINING PROGRAM

## 2024-06-17 PROCEDURE — 84156 ASSAY OF PROTEIN URINE: CPT

## 2024-06-17 PROCEDURE — 76819 FETAL BIOPHYS PROFIL W/O NST: CPT | Performed by: OBSTETRICS & GYNECOLOGY

## 2024-06-17 PROCEDURE — 83880 ASSAY OF NATRIURETIC PEPTIDE: CPT

## 2024-06-17 PROCEDURE — 76821 MIDDLE CEREBRAL ARTERY ECHO: CPT | Performed by: OBSTETRICS & GYNECOLOGY

## 2024-06-17 PROCEDURE — 85027 COMPLETE CBC AUTOMATED: CPT

## 2024-06-17 PROCEDURE — 80053 COMPREHEN METABOLIC PANEL: CPT

## 2024-06-17 PROCEDURE — 99999 PR OFFICE/OUTPT VISIT,PROCEDURE ONLY: CPT | Performed by: OBSTETRICS & GYNECOLOGY

## 2024-06-17 PROCEDURE — 82570 ASSAY OF URINE CREATININE: CPT

## 2024-06-17 PROCEDURE — 84484 ASSAY OF TROPONIN QUANT: CPT

## 2024-06-17 PROCEDURE — 76820 UMBILICAL ARTERY ECHO: CPT | Performed by: OBSTETRICS & GYNECOLOGY

## 2024-06-17 PROCEDURE — 6370000000 HC RX 637 (ALT 250 FOR IP)

## 2024-06-17 RX ORDER — LANOLIN ALCOHOL/MO/W.PET/CERES
400 CREAM (GRAM) TOPICAL NIGHTLY
Status: DISCONTINUED | OUTPATIENT
Start: 2024-06-17 | End: 2024-06-19 | Stop reason: HOSPADM

## 2024-06-17 RX ORDER — VITAMIN A, ASCORBIC ACID, CHOLECALCIFEROL, .ALPHA.-TOCOPHEROL ACETATE, DL-, THIAMINE MONONITRATE, RIBOFLAVIN, NIACINAMIDE, PYRIDOXINE HYDROCHLORIDE, FOLIC ACID, CYANOCOBALAMIN, CALCIUM CARBONATE, IRON, ZINC OXIDE, AND CUPRIC OXIDE 4000; 120; 400; 22; 1.84; 3; 20; 10; 1; 12; 200; 29; 25; 2 [IU]/1; MG/1; [IU]/1; [IU]/1; MG/1; MG/1; MG/1; MG/1; MG/1; UG/1; MG/1; MG/1; MG/1; MG/1
1 TABLET ORAL DAILY
Status: DISCONTINUED | OUTPATIENT
Start: 2024-06-18 | End: 2024-06-19 | Stop reason: HOSPADM

## 2024-06-17 RX ORDER — HYDRALAZINE HYDROCHLORIDE 20 MG/ML
INJECTION INTRAMUSCULAR; INTRAVENOUS
Status: COMPLETED
Start: 2024-06-17 | End: 2024-06-17

## 2024-06-17 RX ORDER — HYDRALAZINE HYDROCHLORIDE 20 MG/ML
10 INJECTION INTRAMUSCULAR; INTRAVENOUS ONCE
Status: COMPLETED | OUTPATIENT
Start: 2024-06-17 | End: 2024-06-17

## 2024-06-17 RX ORDER — ONDANSETRON 4 MG/1
4 TABLET, ORALLY DISINTEGRATING ORAL EVERY 8 HOURS PRN
Status: DISCONTINUED | OUTPATIENT
Start: 2024-06-17 | End: 2024-06-19 | Stop reason: HOSPADM

## 2024-06-17 RX ORDER — LABETALOL HYDROCHLORIDE 5 MG/ML
20 INJECTION, SOLUTION INTRAVENOUS ONCE
Status: COMPLETED | OUTPATIENT
Start: 2024-06-17 | End: 2024-06-17

## 2024-06-17 RX ORDER — BUTALBITAL, ACETAMINOPHEN AND CAFFEINE 50; 325; 40 MG/1; MG/1; MG/1
1 TABLET ORAL EVERY 4 HOURS PRN
Status: DISCONTINUED | OUTPATIENT
Start: 2024-06-17 | End: 2024-06-19 | Stop reason: HOSPADM

## 2024-06-17 RX ORDER — NIFEDIPINE 30 MG/1
30 TABLET, EXTENDED RELEASE ORAL ONCE
Status: COMPLETED | OUTPATIENT
Start: 2024-06-17 | End: 2024-06-17

## 2024-06-17 RX ORDER — ACETAMINOPHEN 500 MG
1000 TABLET ORAL EVERY 8 HOURS PRN
Status: DISCONTINUED | OUTPATIENT
Start: 2024-06-17 | End: 2024-06-19 | Stop reason: HOSPADM

## 2024-06-17 RX ORDER — METOCLOPRAMIDE HYDROCHLORIDE 5 MG/ML
10 INJECTION INTRAMUSCULAR; INTRAVENOUS ONCE
Status: COMPLETED | OUTPATIENT
Start: 2024-06-17 | End: 2024-06-17

## 2024-06-17 RX ORDER — LABETALOL HYDROCHLORIDE 5 MG/ML
INJECTION, SOLUTION INTRAVENOUS
Status: COMPLETED
Start: 2024-06-17 | End: 2024-06-17

## 2024-06-17 RX ORDER — ONDANSETRON 2 MG/ML
4 INJECTION INTRAMUSCULAR; INTRAVENOUS EVERY 6 HOURS PRN
Status: DISCONTINUED | OUTPATIENT
Start: 2024-06-17 | End: 2024-06-19 | Stop reason: HOSPADM

## 2024-06-17 RX ORDER — DIPHENHYDRAMINE HYDROCHLORIDE 50 MG/ML
25 INJECTION INTRAMUSCULAR; INTRAVENOUS ONCE
Status: COMPLETED | OUTPATIENT
Start: 2024-06-17 | End: 2024-06-17

## 2024-06-17 RX ORDER — SODIUM CHLORIDE, SODIUM LACTATE, POTASSIUM CHLORIDE, AND CALCIUM CHLORIDE .6; .31; .03; .02 G/100ML; G/100ML; G/100ML; G/100ML
500 INJECTION, SOLUTION INTRAVENOUS ONCE
Status: COMPLETED | OUTPATIENT
Start: 2024-06-17 | End: 2024-06-17

## 2024-06-17 RX ORDER — MAGNESIUM HYDROXIDE/ALUMINUM HYDROXICE/SIMETHICONE 120; 1200; 1200 MG/30ML; MG/30ML; MG/30ML
30 SUSPENSION ORAL ONCE
Status: DISCONTINUED | OUTPATIENT
Start: 2024-06-17 | End: 2024-06-19 | Stop reason: HOSPADM

## 2024-06-17 RX ORDER — CYCLOBENZAPRINE HCL 10 MG
10 TABLET ORAL 3 TIMES DAILY PRN
Status: DISCONTINUED | OUTPATIENT
Start: 2024-06-17 | End: 2024-06-19 | Stop reason: HOSPADM

## 2024-06-17 RX ORDER — HYDROXYZINE HYDROCHLORIDE 25 MG/1
25 TABLET, FILM COATED ORAL 3 TIMES DAILY PRN
Status: DISCONTINUED | OUTPATIENT
Start: 2024-06-17 | End: 2024-06-19 | Stop reason: HOSPADM

## 2024-06-17 RX ORDER — SODIUM CHLORIDE, SODIUM LACTATE, POTASSIUM CHLORIDE, AND CALCIUM CHLORIDE .6; .31; .03; .02 G/100ML; G/100ML; G/100ML; G/100ML
1000 INJECTION, SOLUTION INTRAVENOUS ONCE
Status: DISCONTINUED | OUTPATIENT
Start: 2024-06-17 | End: 2024-06-17

## 2024-06-17 RX ORDER — LABETALOL 200 MG/1
200 TABLET, FILM COATED ORAL ONCE
Status: DISCONTINUED | OUTPATIENT
Start: 2024-06-17 | End: 2024-06-18

## 2024-06-17 RX ORDER — NIFEDIPINE 30 MG/1
90 TABLET, EXTENDED RELEASE ORAL DAILY
Status: DISCONTINUED | OUTPATIENT
Start: 2024-06-18 | End: 2024-06-18

## 2024-06-17 RX ADMIN — HYDRALAZINE HYDROCHLORIDE 10 MG: 20 INJECTION, SOLUTION INTRAMUSCULAR; INTRAVENOUS at 10:25

## 2024-06-17 RX ADMIN — LABETALOL HYDROCHLORIDE 20 MG: 5 INJECTION, SOLUTION INTRAVENOUS at 10:56

## 2024-06-17 RX ADMIN — ONDANSETRON 4 MG: 4 TABLET, ORALLY DISINTEGRATING ORAL at 17:32

## 2024-06-17 RX ADMIN — METOCLOPRAMIDE 10 MG: 5 INJECTION, SOLUTION INTRAMUSCULAR; INTRAVENOUS at 13:18

## 2024-06-17 RX ADMIN — NIFEDIPINE 30 MG: 30 TABLET, FILM COATED, EXTENDED RELEASE ORAL at 11:27

## 2024-06-17 RX ADMIN — BUTALBITAL, ACETAMINOPHEN, AND CAFFEINE 1 TABLET: 50; 325; 40 TABLET ORAL at 16:22

## 2024-06-17 RX ADMIN — NIFEDIPINE 30 MG: 30 TABLET, FILM COATED, EXTENDED RELEASE ORAL at 10:22

## 2024-06-17 RX ADMIN — HYDRALAZINE HYDROCHLORIDE 10 MG: 20 INJECTION, SOLUTION INTRAMUSCULAR; INTRAVENOUS at 09:06

## 2024-06-17 RX ADMIN — NIFEDIPINE 30 MG: 30 TABLET, FILM COATED, EXTENDED RELEASE ORAL at 13:55

## 2024-06-17 RX ADMIN — DIPHENHYDRAMINE HYDROCHLORIDE 25 MG: 50 INJECTION INTRAMUSCULAR; INTRAVENOUS at 13:16

## 2024-06-17 RX ADMIN — ACETAMINOPHEN 1000 MG: 500 TABLET ORAL at 11:13

## 2024-06-17 RX ADMIN — MAGNESIUM GLUCONATE 500 MG ORAL TABLET 400 MG: 500 TABLET ORAL at 20:30

## 2024-06-17 RX ADMIN — HYDRALAZINE HYDROCHLORIDE 10 MG: 20 INJECTION INTRAMUSCULAR; INTRAVENOUS at 09:06

## 2024-06-17 RX ADMIN — SODIUM CHLORIDE, POTASSIUM CHLORIDE, SODIUM LACTATE AND CALCIUM CHLORIDE 500 ML: 600; 310; 30; 20 INJECTION, SOLUTION INTRAVENOUS at 09:03

## 2024-06-17 ASSESSMENT — PAIN DESCRIPTION - DESCRIPTORS
DESCRIPTORS: ACHING;THROBBING
DESCRIPTORS: ACHING

## 2024-06-17 ASSESSMENT — PAIN SCALES - GENERAL: PAINLEVEL_OUTOF10: 7

## 2024-06-17 ASSESSMENT — PAIN DESCRIPTION - LOCATION
LOCATION: HEAD
LOCATION: HEAD

## 2024-06-17 NOTE — PROGRESS NOTES
Obstetric/Gynecology Resident Interval Note    Patient with two severe range BP fifteen minutes apart. Order for IV Hydralazine 10mg x1 placed. Will recheck BP in 20 minutes after administration.   Home medication of Labetalol 200mg BID (that patient has not yet started) currently held due to maternal bradycardia. Procardia XL 30mg ordered in place.     Will continue to monitor BP closely.    Brenda Dwyer DO  OB/GYN Resident, PGY3  [place], [place]  6/17/2024, 9:04 AM

## 2024-06-17 NOTE — FLOWSHEET NOTE
LATE ENTRY D/T PT CARE.  @0831 pt brought to the unit ambulatory by  after being seen in [facility] for an appt, and was brought up d/t severe range BP x2 in the office.   @0841 writing RN enters triage 2 and  is at bedside discussing plan of care with the pt.  @0845 Pt placed on EFM.  She has c/o gas pain, cramping, and back pain.  Denies c/o headache and visual disturbances.  @0848 VS's as charted.    @0900 BP repeated and  aware of high BP.

## 2024-06-17 NOTE — H&P
OBSTETRICAL HISTORY AND PHYSICAL  [facility]    Date: 2024       Time: 3:04 PM   Patient Name: Siddharth Fermin     Patient : 1995  Room/Bed: 0702/0702-01    Admission Date/Time: 2024  8:31 AM      CC: Severe range BP @ [facility] appointment    HPI: Siddharth Fermin is a 28 y.o.  at 32w6d who presents to L&D for BP management. Patient presented earlier to [facility] office for routine US appointment and found to have severe range BP of 190/90 with repeat 170/90. She denied PreE sx at that time and presented to L&D for BP management. Of note, patient has a dx of cHTN and was started on Labetalol 200 BID last week but didn't know about it and had not picked up her medications. On presentation to triage, she reports epigastric pain and nausea though denies s/sx of PreE.    The patient reports fetal movement is present, denies contractions, denies loss of fluid, denies vaginal bleeding.  She denies HA, vision changes, SOB, chest pain, lightheadedness, dizziness, nausea, vomiting, dysuria, and hematuria.     DATING:  LMP: Patient's last menstrual period was 2023 (approximate).  Estimated Date of Delivery: 24   Based on: LMP consistent with early ultrasound, at 9 3/7 weeks GA    PREGNANCY RISK FACTORS:  Patient Active Problem List   Diagnosis    PCO (polycystic ovaries)    GERD without esophagitis    Generalized anxiety disorder    Lumbar radiculopathy    Migraine with aura    Vitamin D deficiency    S/P laparoscopic sleeve gastrectomy    Morbid obesity (HCC)    Brain concussion    Closed fracture of humerus    Closed fracture of pelvis (HCC)    Closed fracture of vertebra    Depression    Dysmenorrhea    Lipoma    Moderate episode of recurrent major depressive disorder (HCC)    Rh negative state in antepartum period    cHTN (no meds) > meds    32 weeks gestation of pregnancy        Steroids Given In This Pregnancy:  no     REVIEW OF SYSTEMS:   Constitutional:  nsaids.    MEDICATIONS:  Prior to Admission medications    Medication Sig Start Date End Date Taking? Authorizing Provider   metoclopramide (REGLAN) 10 MG tablet Take 1 tablet by mouth nightly as needed (for headaches)  Patient not taking: Reported on 5/15/2024 5/6/24   Vidhi Arce APRN - CNP   Magnesium Oxide -Mg Supplement 400 MG CAPS Take 1 capsule by mouth at bedtime 5/5/24   Lex Torres MD   famotidine (PEPCID) 40 MG tablet Take 1 tablet by mouth every evening  Patient not taking: Reported on 5/15/2024 2/13/24   Marge Lechuga MD   aspirin (ASPIRIN CHILDRENS) 81 MG chewable tablet Take 1 tablet by mouth daily  Patient not taking: Reported on 5/15/2024 1/23/24 5/6/24  Marge Lechuga MD   Prenatal Vit-Fe Fumarate-FA (PRENATAL PO) Take by mouth    ProviderMari MD       FAMILY HISTORY:  family history includes Brain Cancer in her paternal grandfather; Cancer in her maternal grandmother and mother; Cervical Cancer in her maternal aunt, maternal grandmother, and paternal grandmother; Diabetes in her maternal grandmother; Diabetes Type 1  (age of onset: 10) in her maternal cousin; Diabetes type 2  in her maternal grandmother; High Blood Pressure in her father and mother; Migraines in her mother; No Known Problems in her brother and maternal grandfather; Obesity in her father and mother; Other in her mother.    SOCIAL HISTORY:   reports that she has never smoked. She has been exposed to tobacco smoke. She has never used smokeless tobacco. She reports that she does not currently use alcohol. She reports that she does not use drugs.    VITALS:  Vitals:    06/17/24 1315 06/17/24 1330 06/17/24 1400 06/17/24 1430   BP: (!) 145/81 (!) 159/92 (!) 156/87 (!) 144/80   Pulse: 71 64 56 55   Resp:       SpO2: 96% 97% 97% 96%         PHYSICAL EXAM:  Fetal Heart Monitor:  Baseline Heart Rate 130, moderate variability, present accelerations, absent decelerations  [place]: contractions,

## 2024-06-17 NOTE — PROGRESS NOTES
Obstetric/Gynecology Resident Interval Note    Writer in to see patient. Complaining of a headache since being admitted, denies vision changes, SOB, chest pain, RUQ pain. Will try Reglan/Benadryl at this time for her headache and continue to evaluate.     Senior resident updated.    Martha Bourgeois DO  OB/GYN Resident, PGY1  [place], [place]  6/17/2024, 4:16 PM

## 2024-06-17 NOTE — PROGRESS NOTES
Obstetric/Gynecology Resident Interval Note    Writer notified of severe range BP after IV hydralazine administration. Labetalol 20 mg IV ordered.     Senior resident updated.     Martha Bourgeois DO  OB/GYN Resident, PGY1  [place], [place]  6/17/2024, 11:00 AM

## 2024-06-17 NOTE — PROGRESS NOTES
Obstetric/Gynecology Maternal Fetal Medicine Resident Note    Resident in to evaluate patient for severe range BP. Patient denies s/sx of PreE at this time. Patient is discussed with Dr. Leandro Damico. Patient is amenable to presenting to L&D triage for evaluation and BP management.     Lex Torres MD  OBGYN Resident, PGY1  [facility]  6/17/2024, 8:23 AM

## 2024-06-17 NOTE — PROGRESS NOTES
Obstetric/Gynecology Resident Interval Note    Patient seen and evaluated. She is resting in bed comfortably. Aunt is at the bedside. She was able to get some rest. She still has a headache despite Tylenol, Reglan, Benadryl and fluids. Will try  Fioricet. Blood pressures have remained well controlled on Procardia XL 90. Most recent normotensive.   Patient given regular diet. Will keep patient overnight for BP monitoring. Plan to repeat labs in the morning.    All questions answered and concerns addressed.    Dr Lechuga updated and in agreement with plan.    Vitals:    06/17/24 1400 06/17/24 1430 06/17/24 1500 06/17/24 1601   BP: (!) 156/87 (!) 144/80 (!) 149/83 139/81   Pulse: 56 55 59 59   Resp:       SpO2: 97% 96%           Brenda Dwyer DO  OB/GYN Resident, PGY3  [place], [place]  6/17/2024, 4:10 PM

## 2024-06-17 NOTE — DISCHARGE SUMMARY
Obstetric Discharge Summary  [facility]    Patient Name: Siddharth Fermin  Patient : 1995  Primary Care Physician: Miguelina Houston APRN - CNP  Admit Date: 2024    Principal Diagnosis: IUP at 32w6d, admitted for blood pressure management of cHTN (no meds)     Her pregnancy has been complicated by:   Patient Active Problem List   Diagnosis    PCO (polycystic ovaries)    GERD without esophagitis    Generalized anxiety disorder    Lumbar radiculopathy    Migraine with aura    Vitamin D deficiency    S/P laparoscopic sleeve gastrectomy    Morbid obesity (HCC)    Brain concussion    Closed fracture of humerus    Closed fracture of pelvis (HCC)    Closed fracture of vertebra    Depression    Dysmenorrhea    Lipoma    Moderate episode of recurrent major depressive disorder (HCC)    Rh negative state in antepartum period    cHTN (meds) w/ MICHAEL w/o SF    32 weeks gestation of pregnancy    Desires primary CS    Fetal growth restriction antepartum    33 weeks gestation of pregnancy       Infection Present?: No  Hospital Acquired: No    Surgical Operations & Procedures:  Consultations: N/A    Pertinent Findings & Procedures:   Siddharth Fermin is a 28 y.o. female  at 32w6d admitted for blood pressure management of cHTN (no meds).    HD#1 (): Patient had multiple severe range BP requiring IV anti-hypertensives.  Started on Procardia XL, received a total of 90 mg PreE labs wnl, P/C 0.47, meeting criteria for cHTN w/ MICHAEL. Due to severely elevated BP, cardiac workup completed. EKG NSR. Trop and BNP wnl. Patient complained of headache. Received Tylenol, Reglan, Benadryl, Fioricet.  HD #2 (): Repeat PreE labs wnl. BP normotensive overnight with severe range BP in the AM. Given IV antihypertensives and Procardia 30XL for a total of Procardia 120XL. Started on Labetalol 200mg TID  HD #3 (): Headaches are resolved.     Discharge to: Home    Readmission planned: yes

## 2024-06-17 NOTE — PROGRESS NOTES
Obstetric/Gynecology Resident Interval Note    Labs reviewed. CBC showing stable Hgb and Plt. CMP showing stable Cr and LFTs. AST slightly bumped at 47 however not twice the upper limit of normal. P/C is 0.47, meeting criteria for cHTN (meds) w/ MICHAEL.     Cardiac workup is negative, including normal EKG, Trop, and BNP. Patient's bradycardia has improved to the 80s.     Patient seen at bedside and updated. She appears flushed but reports she got sunburnt over the weekend. She reports that she \"does not feel good\" in general, complaining of a headache, upper abdominal pain, and lower back/hip pain. Patient does appear to not feel well. Tylenol ordered for headache. She has received 500cc IV fluids. Flexeril ordered for back/hip pain.     Thus far, patient has received IV Hydralazine 10mg x2 and IV Labetalol 20mg x1 for severe range BP. Procardia XL 30mg has been started for maintenance medication. Will titrate up as needed.     Discussed plan of care with attending Dr. Lechuga. Plan to attempt to control blood pressures with PO medication regimen in the setting of known chronic hypertension prior to making diagnosis of cHTN w/ MICHAEL w SF. No diagnosis made and no indication for delivery at this time. Reviewed with patient. All questions answered and concerns addressed.    Continue to monitor closely.    Vitals:    06/17/24 1000 06/17/24 1020 06/17/24 1050 06/17/24 1110   BP: (!) 163/91 (!) 175/100 (!) 166/93 (!) 154/89   Pulse: 63 75  81   Resp: 16 18  18   SpO2: 100% 98% 96% 96%       Recent Results (from the past 12 hour(s))   CBC    Collection Time: 06/17/24  9:17 AM   Result Value Ref Range    WBC 8.7 3.5 - 11.3 k/uL    RBC 4.00 3.95 - 5.11 m/uL    Hemoglobin 11.5 (L) 11.9 - 15.1 g/dL    Hematocrit 34.3 (L) 36.3 - 47.1 %    MCV 85.8 82.6 - 102.9 fL    MCH 28.8 25.2 - 33.5 pg    MCHC 33.5 28.4 - 34.8 g/dL    RDW 12.8 11.8 - 14.4 %    Platelets 227 138 - 453 k/uL    MPV 10.3 8.1 - 13.5 fL    NRBC Automated 0.0 0.0  per 100 WBC   Comprehensive Metabolic Panel    Collection Time: 06/17/24  9:17 AM   Result Value Ref Range    Sodium 138 136 - 145 mmol/L    Potassium 3.6 (L) 3.7 - 5.3 mmol/L    Chloride 108 (H) 98 - 107 mmol/L    CO2 19 (L) 20 - 31 mmol/L    Anion Gap 11 9 - 16 mmol/L    Glucose 95 74 - 99 mg/dL    BUN 13 6 - 20 mg/dL    Creatinine 0.6 0.50 - 0.90 mg/dL    Est, Glom Filt Rate >90 >60 mL/min/1.73m2    Calcium 8.5 (L) 8.6 - 10.4 mg/dL    Total Protein 6.1 (L) 6.6 - 8.7 g/dL    Albumin 3.4 (L) 3.5 - 5.2 g/dL    Albumin/Globulin Ratio 1.0 1.0 - 2.5    Total Bilirubin 0.7 0.00 - 1.20 mg/dL    Alkaline Phosphatase 128 (H) 35 - 104 U/L    ALT 33 10 - 35 U/L    AST 47 (H) 10 - 35 U/L   Protein / creatinine ratio, urine    Collection Time: 06/17/24  9:17 AM   Result Value Ref Range    Total Protein, Urine 103 mg/dL    Creatinine, Ur 219.0 (H) 28.0 - 217.0 mg/dL    Urine Total Protein Creatinine Ratio 0.47    Brain Natriuretic Peptide    Collection Time: 06/17/24  9:17 AM   Result Value Ref Range    Pro- (H) 0 - 300 pg/mL   Troponin    Collection Time: 06/17/24  9:17 AM   Result Value Ref Range    Troponin, High Sensitivity 7 0 - 14 ng/L   EKG 12 Lead    Collection Time: 06/17/24  9:44 AM   Result Value Ref Range    Ventricular Rate 62 BPM    Atrial Rate 62 BPM    P-R Interval 130 ms    QRS Duration 70 ms    Q-T Interval 426 ms    QTc Calculation (Bazett) 432 ms    P Axis 64 degrees    R Axis 37 degrees    T Axis 6 degrees         Brenda Dwyer DO  OB/GYN Resident, PGY3  [place], [place]  6/17/2024, 11:31 AM

## 2024-06-18 PROBLEM — O36.5990 FETAL GROWTH RESTRICTION ANTEPARTUM: Status: ACTIVE | Noted: 2024-06-18

## 2024-06-18 PROBLEM — Z3A.33 33 WEEKS GESTATION OF PREGNANCY: Status: ACTIVE | Noted: 2024-06-18

## 2024-06-18 LAB
ALBUMIN SERPL-MCNC: 2.7 G/DL (ref 3.5–5.2)
ALBUMIN/GLOB SERPL: 1 {RATIO} (ref 1–2.5)
ALP SERPL-CCNC: 110 U/L (ref 35–104)
ALT SERPL-CCNC: 19 U/L (ref 10–35)
ANION GAP SERPL CALCULATED.3IONS-SCNC: 11 MMOL/L (ref 9–16)
AST SERPL-CCNC: 28 U/L (ref 10–35)
BASOPHILS # BLD: <0.03 K/UL (ref 0–0.2)
BASOPHILS NFR BLD: 0 % (ref 0–2)
BILIRUB SERPL-MCNC: 0.5 MG/DL (ref 0–1.2)
BUN SERPL-MCNC: 13 MG/DL (ref 6–20)
CALCIUM SERPL-MCNC: 7.9 MG/DL (ref 8.6–10.4)
CHLORIDE SERPL-SCNC: 114 MMOL/L (ref 98–107)
CO2 SERPL-SCNC: 17 MMOL/L (ref 20–31)
CREAT SERPL-MCNC: 0.7 MG/DL (ref 0.5–0.9)
EKG ATRIAL RATE: 62 BPM
EKG P AXIS: 64 DEGREES
EKG P-R INTERVAL: 130 MS
EKG Q-T INTERVAL: 426 MS
EKG QRS DURATION: 70 MS
EKG QTC CALCULATION (BAZETT): 432 MS
EKG R AXIS: 37 DEGREES
EKG T AXIS: 6 DEGREES
EKG VENTRICULAR RATE: 62 BPM
EOSINOPHIL # BLD: 0.03 K/UL (ref 0–0.44)
EOSINOPHILS RELATIVE PERCENT: 0 % (ref 1–4)
ERYTHROCYTE [DISTWIDTH] IN BLOOD BY AUTOMATED COUNT: 13.2 % (ref 11.8–14.4)
GFR, ESTIMATED: >90 ML/MIN/1.73M2
GLUCOSE SERPL-MCNC: 116 MG/DL (ref 74–99)
HCT VFR BLD AUTO: 30.9 % (ref 36.3–47.1)
HGB BLD-MCNC: 10.2 G/DL (ref 11.9–15.1)
IMM GRANULOCYTES # BLD AUTO: 0.03 K/UL (ref 0–0.3)
IMM GRANULOCYTES NFR BLD: 0 %
LYMPHOCYTES NFR BLD: 1.32 K/UL (ref 1.1–3.7)
LYMPHOCYTES RELATIVE PERCENT: 17 % (ref 24–43)
MCH RBC QN AUTO: 28.9 PG (ref 25.2–33.5)
MCHC RBC AUTO-ENTMCNC: 33 G/DL (ref 28.4–34.8)
MCV RBC AUTO: 87.5 FL (ref 82.6–102.9)
MONOCYTES NFR BLD: 0.52 K/UL (ref 0.1–1.2)
MONOCYTES NFR BLD: 7 % (ref 3–12)
NEUTROPHILS NFR BLD: 76 % (ref 36–65)
NEUTS SEG NFR BLD: 5.91 K/UL (ref 1.5–8.1)
NRBC BLD-RTO: 0 PER 100 WBC
PLATELET # BLD AUTO: 172 K/UL (ref 138–453)
PMV BLD AUTO: 11 FL (ref 8.1–13.5)
POTASSIUM SERPL-SCNC: 3.3 MMOL/L (ref 3.7–5.3)
PROT SERPL-MCNC: 5.1 G/DL (ref 6.6–8.7)
RBC # BLD AUTO: 3.53 M/UL (ref 3.95–5.11)
SODIUM SERPL-SCNC: 142 MMOL/L (ref 136–145)
WBC OTHER # BLD: 7.8 K/UL (ref 3.5–11.3)

## 2024-06-18 PROCEDURE — 36415 COLL VENOUS BLD VENIPUNCTURE: CPT

## 2024-06-18 PROCEDURE — 6370000000 HC RX 637 (ALT 250 FOR IP): Performed by: STUDENT IN AN ORGANIZED HEALTH CARE EDUCATION/TRAINING PROGRAM

## 2024-06-18 PROCEDURE — 1220000000 HC SEMI PRIVATE OB R&B

## 2024-06-18 PROCEDURE — 6370000000 HC RX 637 (ALT 250 FOR IP)

## 2024-06-18 PROCEDURE — 80053 COMPREHEN METABOLIC PANEL: CPT

## 2024-06-18 PROCEDURE — 85025 COMPLETE CBC W/AUTO DIFF WBC: CPT

## 2024-06-18 PROCEDURE — 6360000002 HC RX W HCPCS

## 2024-06-18 RX ORDER — HYDRALAZINE HYDROCHLORIDE 20 MG/ML
10 INJECTION INTRAMUSCULAR; INTRAVENOUS ONCE
Status: COMPLETED | OUTPATIENT
Start: 2024-06-18 | End: 2024-06-18

## 2024-06-18 RX ORDER — NIFEDIPINE 30 MG/1
120 TABLET, EXTENDED RELEASE ORAL DAILY
Status: DISCONTINUED | OUTPATIENT
Start: 2024-06-19 | End: 2024-06-19 | Stop reason: HOSPADM

## 2024-06-18 RX ORDER — NIFEDIPINE 30 MG/1
30 TABLET, EXTENDED RELEASE ORAL ONCE
Status: COMPLETED | OUTPATIENT
Start: 2024-06-18 | End: 2024-06-18

## 2024-06-18 RX ORDER — PROCHLORPERAZINE EDISYLATE 5 MG/ML
10 INJECTION INTRAMUSCULAR; INTRAVENOUS ONCE
Status: COMPLETED | OUTPATIENT
Start: 2024-06-18 | End: 2024-06-18

## 2024-06-18 RX ORDER — LABETALOL 200 MG/1
200 TABLET, FILM COATED ORAL EVERY 8 HOURS SCHEDULED
Status: DISCONTINUED | OUTPATIENT
Start: 2024-06-18 | End: 2024-06-19 | Stop reason: HOSPADM

## 2024-06-18 RX ADMIN — BUTALBITAL, ACETAMINOPHEN, AND CAFFEINE 1 TABLET: 50; 325; 40 TABLET ORAL at 12:12

## 2024-06-18 RX ADMIN — MAGNESIUM GLUCONATE 500 MG ORAL TABLET 400 MG: 500 TABLET ORAL at 23:44

## 2024-06-18 RX ADMIN — PROCHLORPERAZINE EDISYLATE 10 MG: 5 INJECTION INTRAMUSCULAR; INTRAVENOUS at 13:37

## 2024-06-18 RX ADMIN — HYDRALAZINE HYDROCHLORIDE 10 MG: 20 INJECTION, SOLUTION INTRAMUSCULAR; INTRAVENOUS at 12:34

## 2024-06-18 RX ADMIN — NIFEDIPINE 90 MG: 30 TABLET, FILM COATED, EXTENDED RELEASE ORAL at 08:39

## 2024-06-18 RX ADMIN — BUTALBITAL, ACETAMINOPHEN, AND CAFFEINE 1 TABLET: 50; 325; 40 TABLET ORAL at 07:17

## 2024-06-18 RX ADMIN — LABETALOL HYDROCHLORIDE 200 MG: 200 TABLET, FILM COATED ORAL at 23:44

## 2024-06-18 RX ADMIN — NIFEDIPINE 30 MG: 30 TABLET, FILM COATED, EXTENDED RELEASE ORAL at 12:34

## 2024-06-18 RX ADMIN — LABETALOL HYDROCHLORIDE 200 MG: 200 TABLET, FILM COATED ORAL at 16:24

## 2024-06-18 ASSESSMENT — PAIN DESCRIPTION - LOCATION
LOCATION: HEAD
LOCATION: HEAD

## 2024-06-18 ASSESSMENT — PAIN SCALES - GENERAL: PAINLEVEL_OUTOF10: 4

## 2024-06-18 ASSESSMENT — PAIN DESCRIPTION - DESCRIPTORS: DESCRIPTORS: THROBBING

## 2024-06-18 NOTE — PROGRESS NOTES
Obstetric/Gynecology Resident Interval Note    RN notifying that /93 with 15 minute repeat severe range 173/97 and HR 59. IV hydralazine 10 mg ordered and PO Procardia 30XL ordered.    Vitals:    06/18/24 0118 06/18/24 0453 06/18/24 0812 06/18/24 1210   BP: 123/61 137/73 (!) 144/77 (!) 164/93   Pulse: 53 57 62 62   Resp: 16 16 18 16   Temp:   98.4 °F (36.9 °C)    TempSrc:   Oral    SpO2:   99%          Lex Torres MD  OB/GYN Resident, PGY1  [place], [place]  6/18/2024, 12:31 PM

## 2024-06-18 NOTE — PROGRESS NOTES
Obstetric/Gynecology Resident Interval Note    Blood pressures reviewed and consistently elevated today despite IV Hydralazine for severe range BP this AM followed by addition of additional 30mg Procardia XL for a total of 120mg. Labetalol 200mg TID added to regimen.     Will continue to monitor BP closely.    Dr Littlejohn updated and in agreement.    Vitals:    06/18/24 1545 06/18/24 1615 06/18/24 1745 06/18/24 1845   BP: (!) 156/84 136/70 122/65 126/72   Pulse: 82 71 61 66   Resp:    16   Temp:       TempSrc:       SpO2:             Brenda Dwyer DO  OB/GYN Resident, PGY3  [place], [place]  6/18/2024, 3:51 PM

## 2024-06-18 NOTE — PROGRESS NOTES
Obstetric/Gynecology Resident Interval Note    Patient seen and evaluated at bedside.  Patient wanted to discuss discharge planning and expectations.  Discussed patient's labile blood pressures and continuous blood pressure medication changes. Patient understanding of plan. Discussed how patient would meet criteria for PreE with SF, Mag Sulfate therapy, and delivery @ 34w0d if worsening maternal status occurs. Comfort given and reassured we are not at that point. All questions answered.    Lupe Patel MD  OB/GYN Resident, PGY2  [place], [place]  6/18/2024, 5:39 PM

## 2024-06-18 NOTE — PROGRESS NOTES
OB/GYN PROGRESS NOTE    Siddharth Fermin is a 28 y.o. female  at 33w0d, Hospital Day: 2    Subjective:   Patient has been seen and examined.  Patient able to sleep well overnight, complaining of a slight lingering headache that is improved from yesterday.     The patient reports fetal movement is present, denies contractions, denies loss of fluid, denies vaginal bleeding. Patient denies any vaginal discharge and any urinary complaints. Patient denies vision changes, nausea, vomiting, fever, chills, shortness of breath, chest pain, RUQ pain, abdominal pain, diarrhea, change in color/amount/odor of vaginal discharge, dysuria or, hematuria.     Objective:   Vitals:  Vitals:    24 2330 24 2331 24 0118 24 0453   BP:  117/68 123/61 137/73   Pulse:  62 53 57   Resp: 16  16 16   Temp: 98.2 °F (36.8 °C)      TempSrc: Oral      SpO2:             FHT: 130, moderate variability, accelerations present, few variable decelerations present with spontaneous return to baseline  Contractions: none    Physical Exam:  General appearance:  no apparent distress, alert, and cooperative  HEENT: head atraumatic, normocephalic, moist mucous membranes, trachea midline  Neurologic:  alert, oriented, normal speech, no focal findings or movement disorder noted  Lungs:  No increased work of breathing, good air exchange, clear to auscultation bilaterally, no crackles or wheezing  Heart:  regular rate and rhythm and no murmur    Abdomen:  soft, gravid, and non-tender  Extremities:  no calf tenderness, non edematous  Musculoskeletal: Gross strength equal and intact throughout, no gross abnormalities  Psychiatric: Mood appropriate, normal affect   Pelvic Exam: not assessed    DATA:  Labs:   Recent Results (from the past 24 hour(s))   CBC    Collection Time: 24  9:17 AM   Result Value Ref Range    WBC 8.7 3.5 - 11.3 k/uL    RBC 4.00 3.95 - 5.11 m/uL    Hemoglobin 11.5 (L) 11.9 - 15.1 g/dL    Hematocrit 34.3 (L)  quiet  - [facility] US (): Cephalic, posterior fundal, MAYTE 9.82, BPP 8/8, normal UADs/MCAs    cHTN (meds) w/ MICHAEL    - Patient presented to L&D for BP management and continuous fetal monitoring after having severe range BPs at the [facility] office. Patient met criteria for MICHAEL without SF yesterday due to P/C ratio of 0.47.   - Has not yet met criteria for severe features with plan to attempt BP management with PO medication regimen.   - Patient started on Labetalol 200 BID last week but has not yet started taking. On presentation, patient initially bradycardic to the 40s and home labetalol is held.   - Initial cardiac work up is negative  EKG (): Normal sinus rhythm. Nonspecific T wave abnormality    Trop 7   - PreE labs wnl, P/C 0.47  - ALT/AST 33/47  - She has so far received the following IV antihypertensives:    IV Hydralazine 10mg x2 (last @ 1025)  IV Labetalol 20 x1 (last @ 1056)  - BP overnight normotensive  - Continue Procardia 90 mg XL  - Headache remains though is improved from yesterday, denies s/sx of PreE  - Repeat CBC, CMP awaiting this AM    FGR (AC < 10%tile)   - Seen on [facility] US completed on 24    S/p gastric sleeve (2022)    Anxiety/Depression  - Stable on no medications  - Denies SI/HI    Hx Closed pelvic fracture & lumbar vertebrae fracture (age 14)   - History of accident in which patient had multiple pelvic fractures and vertebral fx that resulted in chronic lower back pain and lumbar radiculopathy   - Patient desires primary  for delivery due to this history    Lumbar radiculopathy   - See above    Migraine w/ aura   - Takes mag oxide as needed at home for headaches    PCOS   - Not previously on medications    GERD     - On presentation yesterday, patient had nausea and epigastric pain that is now resolved  - Maalox/Zofran ordered    BMI 41    Patient Active Problem List    Diagnosis Date Noted    Morbid obesity (HCC) 2022     Priority: Medium    S/P laparoscopic sleeve

## 2024-06-18 NOTE — PROGRESS NOTES
MCV 85.8 82.6 - 102.9 fL    MCH 28.8 25.2 - 33.5 pg    MCHC 33.5 28.4 - 34.8 g/dL    RDW 12.8 11.8 - 14.4 %    Platelets 227 138 - 453 k/uL    MPV 10.3 8.1 - 13.5 fL    NRBC Automated 0.0 0.0 per 100 WBC   Comprehensive Metabolic Panel    Collection Time: 06/17/24  9:17 AM   Result Value Ref Range    Sodium 138 136 - 145 mmol/L    Potassium 3.6 (L) 3.7 - 5.3 mmol/L    Chloride 108 (H) 98 - 107 mmol/L    CO2 19 (L) 20 - 31 mmol/L    Anion Gap 11 9 - 16 mmol/L    Glucose 95 74 - 99 mg/dL    BUN 13 6 - 20 mg/dL    Creatinine 0.6 0.50 - 0.90 mg/dL    Est, Glom Filt Rate >90 >60 mL/min/1.73m2    Calcium 8.5 (L) 8.6 - 10.4 mg/dL    Total Protein 6.1 (L) 6.6 - 8.7 g/dL    Albumin 3.4 (L) 3.5 - 5.2 g/dL    Albumin/Globulin Ratio 1.0 1.0 - 2.5    Total Bilirubin 0.7 0.00 - 1.20 mg/dL    Alkaline Phosphatase 128 (H) 35 - 104 U/L    ALT 33 10 - 35 U/L    AST 47 (H) 10 - 35 U/L   Protein / creatinine ratio, urine    Collection Time: 06/17/24  9:17 AM   Result Value Ref Range    Total Protein, Urine 103 mg/dL    Creatinine, Ur 219.0 (H) 28.0 - 217.0 mg/dL    Urine Total Protein Creatinine Ratio 0.47    Brain Natriuretic Peptide    Collection Time: 06/17/24  9:17 AM   Result Value Ref Range    Pro- (H) 0 - 300 pg/mL   Troponin    Collection Time: 06/17/24  9:17 AM   Result Value Ref Range    Troponin, High Sensitivity 7 0 - 14 ng/L   EKG 12 Lead    Collection Time: 06/17/24  9:44 AM   Result Value Ref Range    Ventricular Rate 62 BPM    Atrial Rate 62 BPM    P-R Interval 130 ms    QRS Duration 70 ms    Q-T Interval 426 ms    QTc Calculation (Bazett) 432 ms    P Axis 64 degrees    R Axis 37 degrees    T Axis 6 degrees   CBC with Auto Differential    Collection Time: 06/18/24  7:04 AM   Result Value Ref Range    WBC 7.8 3.5 - 11.3 k/uL    RBC 3.53 (L) 3.95 - 5.11 m/uL    Hemoglobin 10.2 (L) 11.9 - 15.1 g/dL    Hematocrit 30.9 (L) 36.3 - 47.1 %    MCV 87.5 82.6 - 102.9 fL    MCH 28.9 25.2 - 33.5 pg    MCHC 33.0 28.4 - 34.8  TID. If BPs remain stable through lunch, can d/c home and follow up in office on Friday. All questions answered.    Attending's Name:  Barbie Littlejohn DO

## 2024-06-18 NOTE — CARE COORDINATION
CASE MANAGEMENT ANTEPARTUM TRANSITIONAL CARE PLAN    32 weeks gestation of pregnancy [Z3A.32]  33 weeks gestation of pregnancy [Z3A.33]    OB Provider: Dr Lechuga    Writer met w/ Siddharth at her bedside to discuss DCP. She is admitted for elevated BP's at this time.     Writer verified address/phone number correct on facesheet. She states she lives alone. She denied barriers with transportation home, to doctors appointments or with paying for medications upon discharge home.     BCBS insurance correct primary with [employer] secondary through her ex- Usama Roach.     Per mom, plan is to dc home possibly tomorrow once labetalol is established.  She was unaware that she was supposed to start labetalol 200 BID after her M visit last week.      She states she is having a girl \"Maryellen\"    DME: has BP cuff at home.   HOME CARE: no    Readmission Risk              Risk of Unplanned Readmission:  10

## 2024-06-18 NOTE — PROGRESS NOTES
Obstetric/Gynecology Resident Interval Note    Resident notified by RN that deep variable deceleration noted on FHT at 2338 with spontaneous resolution to baseline. Overall FHT category I. Patient repositioned to left side. No further concerns at this time.    Vitals:    06/17/24 2330 06/17/24 2331 06/18/24 0118 06/18/24 0453   BP:  117/68 123/61 137/73   Pulse:  62 53 57   Resp: 16  16 16   Temp: 98.2 °F (36.8 °C)      TempSrc: Oral      SpO2:               Emma Montanez DO  OB/GYN Resident, PGY2  [place], [place]  6/17/2024, 11:41 PM

## 2024-06-18 NOTE — CARE COORDINATION
ANTEPARTUM NOTE    32 weeks gestation of pregnancy [Z3A.32]  33 weeks gestation of pregnancy [Z3A.33]    Siddharth was admitted to L&D on 6/17/2024 as outpatient for severe range BPs @ 32 6/7 at [facility] appointment.        PLAN:   cEFM/TOCO  Labwork and imaging as ordered  EKG ordered   IV Hydralazine 10mg  Maalox and zofran for epigastric pain  Started on Procardia 30 mg XL     OB GYN Provider: Nadine OB    Will meet with patient to verify name/address/phone/insurance and discuss discharge planning.     Anticipate DC home once hemodynamically stable and BP's are controlled. .

## 2024-06-19 ENCOUNTER — HOSPITAL ENCOUNTER (INPATIENT)
Age: 29
LOS: 2 days | Discharge: HOME | DRG: 832 | End: 2024-06-23
Attending: OBSTETRICS & GYNECOLOGY | Admitting: STUDENT IN AN ORGANIZED HEALTH CARE EDUCATION/TRAINING PROGRAM
Payer: COMMERCIAL

## 2024-06-19 VITALS
TEMPERATURE: 98.1 F | HEART RATE: 82 BPM | SYSTOLIC BLOOD PRESSURE: 136 MMHG | OXYGEN SATURATION: 97 % | RESPIRATION RATE: 16 BRPM | DIASTOLIC BLOOD PRESSURE: 80 MMHG

## 2024-06-19 PROBLEM — O09.93 HIGH-RISK PREGNANCY IN THIRD TRIMESTER: Status: ACTIVE | Noted: 2024-06-19

## 2024-06-19 LAB
ALBUMIN SERPL-MCNC: 3.5 G/DL (ref 3.5–5.2)
ALBUMIN/GLOB SERPL: 1 {RATIO} (ref 1–2.5)
ALP SERPL-CCNC: 151 U/L (ref 35–104)
ALT SERPL-CCNC: 33 U/L (ref 10–35)
ANION GAP SERPL CALCULATED.3IONS-SCNC: 14 MMOL/L (ref 9–16)
AST SERPL-CCNC: 44 U/L (ref 10–35)
BASOPHILS # BLD: 0.03 K/UL (ref 0–0.2)
BASOPHILS NFR BLD: 0 % (ref 0–2)
BILIRUB SERPL-MCNC: 0.9 MG/DL (ref 0–1.2)
BUN SERPL-MCNC: 14 MG/DL (ref 6–20)
CALCIUM SERPL-MCNC: 8.5 MG/DL (ref 8.6–10.4)
CHLORIDE SERPL-SCNC: 107 MMOL/L (ref 98–107)
CO2 SERPL-SCNC: 18 MMOL/L (ref 20–31)
CREAT SERPL-MCNC: 0.5 MG/DL (ref 0.5–0.9)
CREAT UR-MCNC: 167 MG/DL (ref 28–217)
EOSINOPHIL # BLD: 0.04 K/UL (ref 0–0.44)
EOSINOPHILS RELATIVE PERCENT: 0 % (ref 1–4)
ERYTHROCYTE [DISTWIDTH] IN BLOOD BY AUTOMATED COUNT: 13.1 % (ref 11.8–14.4)
GFR, ESTIMATED: >90 ML/MIN/1.73M2
GLUCOSE SERPL-MCNC: 80 MG/DL (ref 74–99)
HCT VFR BLD AUTO: 36 % (ref 36.3–47.1)
HGB BLD-MCNC: 12.2 G/DL (ref 11.9–15.1)
IMM GRANULOCYTES # BLD AUTO: 0.05 K/UL (ref 0–0.3)
IMM GRANULOCYTES NFR BLD: 1 %
LYMPHOCYTES NFR BLD: 0.98 K/UL (ref 1.1–3.7)
LYMPHOCYTES RELATIVE PERCENT: 9 % (ref 24–43)
MCH RBC QN AUTO: 29 PG (ref 25.2–33.5)
MCHC RBC AUTO-ENTMCNC: 33.9 G/DL (ref 28.4–34.8)
MCV RBC AUTO: 85.5 FL (ref 82.6–102.9)
MONOCYTES NFR BLD: 0.56 K/UL (ref 0.1–1.2)
MONOCYTES NFR BLD: 5 % (ref 3–12)
NEUTROPHILS NFR BLD: 85 % (ref 36–65)
NEUTS SEG NFR BLD: 9.09 K/UL (ref 1.5–8.1)
NRBC BLD-RTO: 0 PER 100 WBC
PLATELET # BLD AUTO: 170 K/UL (ref 138–453)
PMV BLD AUTO: 10 FL (ref 8.1–13.5)
POTASSIUM SERPL-SCNC: 3.8 MMOL/L (ref 3.7–5.3)
PROT SERPL-MCNC: 6.5 G/DL (ref 6.6–8.7)
RBC # BLD AUTO: 4.21 M/UL (ref 3.95–5.11)
SODIUM SERPL-SCNC: 139 MMOL/L (ref 136–145)
TOTAL PROTEIN, URINE: 493 MG/DL
URINE TOTAL PROTEIN CREATININE RATIO: 2.95
WBC OTHER # BLD: 10.8 K/UL (ref 3.5–11.3)

## 2024-06-19 PROCEDURE — 6360000002 HC RX W HCPCS: Performed by: STUDENT IN AN ORGANIZED HEALTH CARE EDUCATION/TRAINING PROGRAM

## 2024-06-19 PROCEDURE — 96375 TX/PRO/DX INJ NEW DRUG ADDON: CPT

## 2024-06-19 PROCEDURE — 99215 OFFICE O/P EST HI 40 MIN: CPT

## 2024-06-19 PROCEDURE — 96361 HYDRATE IV INFUSION ADD-ON: CPT

## 2024-06-19 PROCEDURE — G0378 HOSPITAL OBSERVATION PER HR: HCPCS

## 2024-06-19 PROCEDURE — 85025 COMPLETE CBC W/AUTO DIFF WBC: CPT

## 2024-06-19 PROCEDURE — G0379 DIRECT REFER HOSPITAL OBSERV: HCPCS

## 2024-06-19 PROCEDURE — 81015 MICROSCOPIC EXAM OF URINE: CPT

## 2024-06-19 PROCEDURE — 2580000003 HC RX 258

## 2024-06-19 PROCEDURE — 6370000000 HC RX 637 (ALT 250 FOR IP)

## 2024-06-19 PROCEDURE — 84156 ASSAY OF PROTEIN URINE: CPT

## 2024-06-19 PROCEDURE — 6370000000 HC RX 637 (ALT 250 FOR IP): Performed by: STUDENT IN AN ORGANIZED HEALTH CARE EDUCATION/TRAINING PROGRAM

## 2024-06-19 PROCEDURE — 80053 COMPREHEN METABOLIC PANEL: CPT

## 2024-06-19 PROCEDURE — 83690 ASSAY OF LIPASE: CPT

## 2024-06-19 PROCEDURE — 82570 ASSAY OF URINE CREATININE: CPT

## 2024-06-19 PROCEDURE — 2500000003 HC RX 250 WO HCPCS

## 2024-06-19 PROCEDURE — 81003 URINALYSIS AUTO W/O SCOPE: CPT

## 2024-06-19 RX ORDER — NIFEDIPINE 60 MG/1
120 TABLET, EXTENDED RELEASE ORAL DAILY
Qty: 60 TABLET | Refills: 3 | Status: SHIPPED | OUTPATIENT
Start: 2024-06-19 | End: 2024-06-19

## 2024-06-19 RX ORDER — SODIUM CHLORIDE, SODIUM LACTATE, POTASSIUM CHLORIDE, AND CALCIUM CHLORIDE .6; .31; .03; .02 G/100ML; G/100ML; G/100ML; G/100ML
1000 INJECTION, SOLUTION INTRAVENOUS ONCE
Status: COMPLETED | OUTPATIENT
Start: 2024-06-19 | End: 2024-06-20

## 2024-06-19 RX ORDER — MAGNESIUM HYDROXIDE/ALUMINUM HYDROXICE/SIMETHICONE 120; 1200; 1200 MG/30ML; MG/30ML; MG/30ML
30 SUSPENSION ORAL EVERY 6 HOURS PRN
Status: DISCONTINUED | OUTPATIENT
Start: 2024-06-19 | End: 2024-06-23 | Stop reason: HOSPADM

## 2024-06-19 RX ORDER — LIDOCAINE 4 G/G
1 PATCH TOPICAL DAILY
Status: DISCONTINUED | OUTPATIENT
Start: 2024-06-19 | End: 2024-06-23 | Stop reason: HOSPADM

## 2024-06-19 RX ORDER — CYCLOBENZAPRINE HCL 5 MG
5 TABLET ORAL ONCE
Status: COMPLETED | OUTPATIENT
Start: 2024-06-19 | End: 2024-06-19

## 2024-06-19 RX ORDER — ONDANSETRON 4 MG/1
4 TABLET, ORALLY DISINTEGRATING ORAL EVERY 8 HOURS PRN
Status: DISCONTINUED | OUTPATIENT
Start: 2024-06-19 | End: 2024-06-23 | Stop reason: HOSPADM

## 2024-06-19 RX ORDER — HYDRALAZINE HYDROCHLORIDE 20 MG/ML
10 INJECTION INTRAMUSCULAR; INTRAVENOUS ONCE
Status: COMPLETED | OUTPATIENT
Start: 2024-06-19 | End: 2024-06-19

## 2024-06-19 RX ORDER — ONDANSETRON 2 MG/ML
4 INJECTION INTRAMUSCULAR; INTRAVENOUS EVERY 6 HOURS PRN
Status: DISCONTINUED | OUTPATIENT
Start: 2024-06-19 | End: 2024-06-23 | Stop reason: HOSPADM

## 2024-06-19 RX ORDER — LABETALOL 200 MG/1
400 TABLET, FILM COATED ORAL ONCE
Status: COMPLETED | OUTPATIENT
Start: 2024-06-19 | End: 2024-06-19

## 2024-06-19 RX ORDER — ACETAMINOPHEN 500 MG
500 TABLET ORAL ONCE
Status: COMPLETED | OUTPATIENT
Start: 2024-06-19 | End: 2024-06-19

## 2024-06-19 RX ORDER — NIFEDIPINE 60 MG/1
120 TABLET, EXTENDED RELEASE ORAL DAILY
Qty: 60 TABLET | Refills: 3 | Status: SHIPPED | OUTPATIENT
Start: 2024-06-19

## 2024-06-19 RX ORDER — LABETALOL 200 MG/1
200 TABLET, FILM COATED ORAL 3 TIMES DAILY
Qty: 90 TABLET | Refills: 3 | Status: ON HOLD | OUTPATIENT
Start: 2024-06-19 | End: 2024-06-22 | Stop reason: HOSPADM

## 2024-06-19 RX ADMIN — SODIUM CHLORIDE, POTASSIUM CHLORIDE, SODIUM LACTATE AND CALCIUM CHLORIDE 1000 ML: 600; 310; 30; 20 INJECTION, SOLUTION INTRAVENOUS at 22:56

## 2024-06-19 RX ADMIN — LABETALOL HYDROCHLORIDE 400 MG: 200 TABLET, FILM COATED ORAL at 22:47

## 2024-06-19 RX ADMIN — LABETALOL HYDROCHLORIDE 200 MG: 200 TABLET, FILM COATED ORAL at 09:23

## 2024-06-19 RX ADMIN — NIFEDIPINE 120 MG: 30 TABLET, FILM COATED, EXTENDED RELEASE ORAL at 09:23

## 2024-06-19 RX ADMIN — LABETALOL HYDROCHLORIDE 200 MG: 200 TABLET, FILM COATED ORAL at 15:39

## 2024-06-19 RX ADMIN — HYDRALAZINE HYDROCHLORIDE 10 MG: 20 INJECTION, SOLUTION INTRAMUSCULAR; INTRAVENOUS at 22:48

## 2024-06-19 RX ADMIN — ALUMINUM HYDROXIDE, MAGNESIUM HYDROXIDE, AND SIMETHICONE 30 ML: 200; 200; 20 SUSPENSION ORAL at 23:05

## 2024-06-19 RX ADMIN — ACETAMINOPHEN 500 MG: 500 TABLET ORAL at 22:24

## 2024-06-19 RX ADMIN — CYCLOBENZAPRINE HYDROCHLORIDE 5 MG: 5 TABLET, FILM COATED ORAL at 22:24

## 2024-06-19 RX ADMIN — SODIUM CHLORIDE, PRESERVATIVE FREE 20 MG: 5 INJECTION INTRAVENOUS at 22:47

## 2024-06-19 ASSESSMENT — PAIN SCALES - GENERAL: PAINLEVEL_OUTOF10: 9

## 2024-06-19 NOTE — DISCHARGE INSTRUCTIONS
Notify physician if you experience: Dizziness or severe headache  Spots before eyes or blurred vision    Chills and or fever  Vaginal pressure  Epigastric pain  Uterine contractions are regular and 5 minutes apart if 1st baby or 10 minutes apart if not 1st baby  Bag or levine leaking or gush of fluid from vagina  Any vaginal bleeding that is heavier than a menstrual period  Intermittent low backache  Vomiting or diarrhea for several hours  Decrease or absence of baby movement  Fetal movement card instructions given  Right sided abdominal pain  Increase or change in vaginal discharge  Abdominal or menstrual like cramping that is constant or heavier, comes and goes  Swelling of face, hands, legs or feet not decreased with rest on left side.

## 2024-06-19 NOTE — PROGRESS NOTES
Obstetric/Gynecology Resident Interval Note    Patient seen and evaluated at bedside.  Afternoon blood pressures have been normotensive.  Patient currently controlled on Procardia 120 mg XL daily and labetalol 200 mg 3 times daily.  Patient comfortable with medications.  BP medication sent on discharge.    At this time, patient denies signs and symptoms of preeclampsia.  Given strict return precautions.  Patient has an appointment with Dr. De this Friday for BP check and prenatal visit.  Patient given strict return precautions to return to the L&D for further signs and symptoms of preeclampsia.  Patient sent with blood pressure cuff for home and plans to use twice daily.    Patient stable for discharge home at this time.    Vitals:    06/19/24 1342 06/19/24 1528 06/19/24 1529 06/19/24 1607   BP: 135/80 134/78 134/78 136/80   Pulse: 77 85 82 82   Resp: 16 16 16 16   Temp:       TempSrc:       SpO2: 98% 99% 97% 97%     No results found for this or any previous visit (from the past 24 hour(s)).      Lupe Patel MD  OB/GYN Resident, PGY2  [place], [place]  6/19/2024, 4:10 PM

## 2024-06-19 NOTE — PROGRESS NOTES
CLINICAL PHARMACY NOTE: MEDS TO BEDS    Total # of Prescriptions Filled: 1   The following medications were delivered to the patient:  Labetalol 200mg    Additional Documentation: delivered to patient in room 702 6/19 at 4:18pm. Co-pay $16.59 cash.

## 2024-06-19 NOTE — FLOWSHEET NOTE
Patient ambulated to lobby for discharge. Copy of papers given to patient. Patient to follow up in office at next scheduled appointment.

## 2024-06-20 ENCOUNTER — APPOINTMENT (OUTPATIENT)
Dept: ULTRASOUND IMAGING | Age: 29
DRG: 832 | End: 2024-06-20
Payer: COMMERCIAL

## 2024-06-20 PROBLEM — Z3A.32 32 WEEKS GESTATION OF PREGNANCY: Status: RESOLVED | Noted: 2024-06-17 | Resolved: 2024-06-20

## 2024-06-20 PROBLEM — E55.9 VITAMIN D DEFICIENCY: Status: RESOLVED | Noted: 2022-11-03 | Resolved: 2024-06-20

## 2024-06-20 LAB
BACTERIA URNS QL MICRO: NORMAL
BILIRUB UR QL STRIP: NEGATIVE
CASTS #/AREA URNS LPF: NORMAL /LPF (ref 0–8)
CLARITY UR: CLEAR
COLOR UR: YELLOW
EPI CELLS #/AREA URNS HPF: NORMAL /HPF (ref 0–5)
GLUCOSE UR STRIP-MCNC: NEGATIVE MG/DL
HGB UR QL STRIP.AUTO: NEGATIVE
KETONES UR STRIP-MCNC: NEGATIVE MG/DL
LEUKOCYTE ESTERASE UR QL STRIP: NEGATIVE
LIPASE SERPL-CCNC: 48 U/L (ref 13–60)
NITRITE UR QL STRIP: NEGATIVE
PH UR STRIP: 6 [PH] (ref 5–8)
PROT UR STRIP-MCNC: ABNORMAL MG/DL
RBC #/AREA URNS HPF: NORMAL /HPF (ref 0–4)
SP GR UR STRIP: 1.02 (ref 1–1.03)
UROBILINOGEN UR STRIP-ACNC: NORMAL EU/DL (ref 0–1)
WBC #/AREA URNS HPF: NORMAL /HPF (ref 0–5)

## 2024-06-20 PROCEDURE — 76705 ECHO EXAM OF ABDOMEN: CPT

## 2024-06-20 PROCEDURE — 2580000003 HC RX 258

## 2024-06-20 PROCEDURE — 2580000003 HC RX 258: Performed by: STUDENT IN AN ORGANIZED HEALTH CARE EDUCATION/TRAINING PROGRAM

## 2024-06-20 PROCEDURE — 96365 THER/PROPH/DIAG IV INF INIT: CPT

## 2024-06-20 PROCEDURE — 96361 HYDRATE IV INFUSION ADD-ON: CPT

## 2024-06-20 PROCEDURE — 96376 TX/PRO/DX INJ SAME DRUG ADON: CPT

## 2024-06-20 PROCEDURE — G0378 HOSPITAL OBSERVATION PER HR: HCPCS

## 2024-06-20 PROCEDURE — 2500000003 HC RX 250 WO HCPCS

## 2024-06-20 PROCEDURE — 6370000000 HC RX 637 (ALT 250 FOR IP): Performed by: STUDENT IN AN ORGANIZED HEALTH CARE EDUCATION/TRAINING PROGRAM

## 2024-06-20 PROCEDURE — 6360000002 HC RX W HCPCS: Performed by: STUDENT IN AN ORGANIZED HEALTH CARE EDUCATION/TRAINING PROGRAM

## 2024-06-20 PROCEDURE — 6370000000 HC RX 637 (ALT 250 FOR IP)

## 2024-06-20 RX ORDER — METOCLOPRAMIDE 10 MG/1
10 TABLET ORAL 3 TIMES DAILY PRN
Status: DISCONTINUED | OUTPATIENT
Start: 2024-06-20 | End: 2024-06-23 | Stop reason: HOSPADM

## 2024-06-20 RX ORDER — CYCLOBENZAPRINE HCL 10 MG
10 TABLET ORAL 3 TIMES DAILY PRN
Status: DISCONTINUED | OUTPATIENT
Start: 2024-06-20 | End: 2024-06-23 | Stop reason: HOSPADM

## 2024-06-20 RX ORDER — LABETALOL 200 MG/1
600 TABLET, FILM COATED ORAL EVERY 8 HOURS SCHEDULED
Status: DISCONTINUED | OUTPATIENT
Start: 2024-06-20 | End: 2024-06-22

## 2024-06-20 RX ORDER — ACETAMINOPHEN 500 MG
1000 TABLET ORAL EVERY 6 HOURS PRN
Status: DISCONTINUED | OUTPATIENT
Start: 2024-06-20 | End: 2024-06-23 | Stop reason: HOSPADM

## 2024-06-20 RX ORDER — DIPHENHYDRAMINE HCL 25 MG
25 TABLET ORAL EVERY 6 HOURS PRN
Status: DISCONTINUED | OUTPATIENT
Start: 2024-06-20 | End: 2024-06-23 | Stop reason: HOSPADM

## 2024-06-20 RX ORDER — LANOLIN ALCOHOL/MO/W.PET/CERES
400 CREAM (GRAM) TOPICAL NIGHTLY
Status: DISCONTINUED | OUTPATIENT
Start: 2024-06-20 | End: 2024-06-23 | Stop reason: HOSPADM

## 2024-06-20 RX ORDER — NIFEDIPINE 30 MG/1
120 TABLET, EXTENDED RELEASE ORAL DAILY
Status: DISCONTINUED | OUTPATIENT
Start: 2024-06-20 | End: 2024-06-23 | Stop reason: HOSPADM

## 2024-06-20 RX ORDER — VITAMIN A, ASCORBIC ACID, CHOLECALCIFEROL, .ALPHA.-TOCOPHEROL ACETATE, DL-, THIAMINE MONONITRATE, RIBOFLAVIN, NIACINAMIDE, PYRIDOXINE HYDROCHLORIDE, FOLIC ACID, CYANOCOBALAMIN, CALCIUM CARBONATE, IRON, ZINC OXIDE, AND CUPRIC OXIDE 4000; 120; 400; 22; 1.84; 3; 20; 10; 1; 12; 200; 29; 25; 2 [IU]/1; MG/1; [IU]/1; [IU]/1; MG/1; MG/1; MG/1; MG/1; MG/1; UG/1; MG/1; MG/1; MG/1; MG/1
1 TABLET ORAL DAILY
Status: DISCONTINUED | OUTPATIENT
Start: 2024-06-20 | End: 2024-06-23 | Stop reason: HOSPADM

## 2024-06-20 RX ORDER — HYDROXYZINE HYDROCHLORIDE 25 MG/1
25 TABLET, FILM COATED ORAL 3 TIMES DAILY PRN
Status: DISCONTINUED | OUTPATIENT
Start: 2024-06-20 | End: 2024-06-23 | Stop reason: HOSPADM

## 2024-06-20 RX ORDER — LABETALOL 200 MG/1
400 TABLET, FILM COATED ORAL EVERY 8 HOURS SCHEDULED
Status: DISCONTINUED | OUTPATIENT
Start: 2024-06-20 | End: 2024-06-20

## 2024-06-20 RX ADMIN — SODIUM CHLORIDE, PRESERVATIVE FREE 20 MG: 5 INJECTION INTRAVENOUS at 20:40

## 2024-06-20 RX ADMIN — LABETALOL HYDROCHLORIDE 400 MG: 200 TABLET, FILM COATED ORAL at 14:47

## 2024-06-20 RX ADMIN — NIFEDIPINE 120 MG: 30 TABLET, FILM COATED, EXTENDED RELEASE ORAL at 10:34

## 2024-06-20 RX ADMIN — CYCLOBENZAPRINE 10 MG: 10 TABLET, FILM COATED ORAL at 17:48

## 2024-06-20 RX ADMIN — MAGNESIUM GLUCONATE 500 MG ORAL TABLET 400 MG: 500 TABLET ORAL at 20:40

## 2024-06-20 RX ADMIN — PIPERACILLIN AND TAZOBACTAM 3375 MG: 3; .375 INJECTION, POWDER, LYOPHILIZED, FOR SOLUTION INTRAVENOUS at 23:27

## 2024-06-20 RX ADMIN — LABETALOL HYDROCHLORIDE 400 MG: 200 TABLET, FILM COATED ORAL at 08:13

## 2024-06-20 RX ADMIN — LABETALOL HYDROCHLORIDE 600 MG: 200 TABLET, FILM COATED ORAL at 22:32

## 2024-06-20 RX ADMIN — ACETAMINOPHEN 1000 MG: 500 TABLET ORAL at 20:48

## 2024-06-20 RX ADMIN — CYCLOBENZAPRINE 10 MG: 10 TABLET, FILM COATED ORAL at 10:30

## 2024-06-20 RX ADMIN — SODIUM CHLORIDE, PRESERVATIVE FREE 20 MG: 5 INJECTION INTRAVENOUS at 10:27

## 2024-06-20 ASSESSMENT — PAIN DESCRIPTION - LOCATION
LOCATION: RIB CAGE
LOCATION: BACK;SHOULDER

## 2024-06-20 ASSESSMENT — PAIN DESCRIPTION - DESCRIPTORS: DESCRIPTORS: ACHING;OTHER (COMMENT)

## 2024-06-20 ASSESSMENT — PAIN DESCRIPTION - ORIENTATION: ORIENTATION: MID

## 2024-06-20 ASSESSMENT — PAIN SCALES - GENERAL
PAINLEVEL_OUTOF10: 8
PAINLEVEL_OUTOF10: 6
PAINLEVEL_OUTOF10: 5

## 2024-06-20 NOTE — CARE COORDINATION
ANTEPARTUM DISCHARGE PLANNING EVALUATION: OP/OBSERVATION        6/20/24, 9:43 AM EDT    Siddharth Roqueczypkowski         Location: 0702/0702-01   Reason for hospitalization: High-risk pregnancy in third trimester [O09.93]     Siddharth was admitted to L&D on 6/19- she was previously DC'd to home that morning for elevated BP w/ RUQ Pain @ 33w1d    This CM met w/ Marilu at her bedside and verified demos correct, she lives alone, denied barriers w/ transportation home or paying for medications.     Insurance: BCBS Primary, [employer]    DME: BP cuff  HC: none    PCP: Miguelina Houston, APRN - CNP    OB GYN Provider: Dr. Littlejohn    Transportation/Food Security/Housekeeping Addressed:  No issues identified.     Case Management Services Information Letter Provided [x]    Transition plan:    Labetalol 200 mg TID increased to 400 mg TID, dose given now   CBC, CMP ordered on admission   Maintain cEFM and TOCO   PNV/SCDs/ASA ordered for patient   BP protocol s/p Hydralazine 10mg IV   Mag ox ordered qhs   Maalox/Zofran ordered

## 2024-06-20 NOTE — CARE COORDINATION
06/20/24 0940   Readmission Assessment   Number of Days since last admission? 1-7 days   Previous Disposition Home with Family  (Obs admission)   Who is being Interviewed Patient   What was the patient's/caregiver's perception as to why they think they needed to return back to the hospital? Other (Comment)  (This is an Observation admission)   Did you visit your Primary Care Physician after you left the hospital, before you returned this time? No   Why weren't you able to visit your PCP? Other (Comment)  (n/A)   Did you see a specialist, such as Cardiac, Pulmonary, Orthopedic Physician, etc. after you left the hospital? No   Who advised the patient to return to the hospital? Self-referral  (This is an Observation admission)   Does the patient report anything that got in the way of taking their medications? No   In our efforts to provide the best possible care to you and others like you, can you think of anything that we could have done to help you after you left the hospital the first time, so that you might not have needed to return so soon? Other (Comment)  (This is an Observation admission)

## 2024-06-20 NOTE — H&P
PCO (polycystic ovaries) 10/17/2019    Lipoma 10/25/2016    Brain concussion 2012    Closed fracture of humerus 2012    Closed fracture of pelvis (HCC) 2012     Desires primary .  Recommended previously by ortho       Closed fracture of vertebra 2012    Dysmenorrhea 2012       Plan discussed with Dr. Reeves, who is agreeable.     Steroids given this admission: No    Risks, benefits, alternatives and possible complications have been discussed in detail with the patient. Admission, and post admission procedures and expectations were discussed in detail. All questions were answered.    Attending's Name: Dr. Leandro Montanez DO  Ob/Gyn Resident  2024, 10:21 PM

## 2024-06-20 NOTE — DISCHARGE SUMMARY
PROTOCOL REVIEWED ON DISCHARGE. PATIENT GIVEN CHOLOHEXADINE WASH.    Course of patient: complicated by cholecytitis     Discharge to: Home    Readmission planned: yes at the time of delivery    Recommendations on Discharge:     Medications:      Medication List        CHANGE how you take these medications      labetalol 200 MG tablet  Commonly known as: NORMODYNE  Take 4 tablets by mouth in the morning, at noon, and at bedtime  What changed: how much to take            CONTINUE taking these medications      aspirin 81 MG chewable tablet  Commonly known as: Aspirin Childrens  Take 1 tablet by mouth daily     famotidine 40 MG tablet  Commonly known as: PEPCID  Take 1 tablet by mouth every evening     Magnesium Oxide -Mg Supplement 400 MG Caps  Take 1 capsule by mouth at bedtime     metoclopramide 10 MG tablet  Commonly known as: REGLAN  Take 1 tablet by mouth nightly as needed (for headaches)     NIFEdipine 60 MG extended release tablet  Commonly known as: Nifedical XL  Take 2 tablets by mouth daily     PRENATAL PO               Where to Get Your Medications        These medications were sent to [place] [id number] [facility] -  [phone] -  [phone]  [address] [place] [id number]      Phone: [phone]   labetalol 200 MG tablet       Activity: activity as tolerated  Diet: low fat General diet  Follow up: tomorrow for MFM scan    Condition on discharge: stable    Discharge date: 6/23/24    Tae Cruz MD  Ob/Gyn Resident    Comments:  Home care and follow-up care were reviewed. PO hydration, and fetal kick counts. All questions and concerns were addressed and patient expressed understanding.

## 2024-06-21 ENCOUNTER — APPOINTMENT (OUTPATIENT)
Dept: MRI IMAGING | Age: 29
DRG: 832 | End: 2024-06-21
Payer: COMMERCIAL

## 2024-06-21 PROBLEM — K80.10 CHRONIC CHOLECYSTITIS WITH CALCULUS: Status: ACTIVE | Noted: 2024-06-21

## 2024-06-21 PROCEDURE — 6370000000 HC RX 637 (ALT 250 FOR IP)

## 2024-06-21 PROCEDURE — 1220000000 HC SEMI PRIVATE OB R&B

## 2024-06-21 PROCEDURE — 96366 THER/PROPH/DIAG IV INF ADDON: CPT

## 2024-06-21 PROCEDURE — 2500000003 HC RX 250 WO HCPCS

## 2024-06-21 PROCEDURE — 99222 1ST HOSP IP/OBS MODERATE 55: CPT | Performed by: SURGERY

## 2024-06-21 PROCEDURE — 99221 1ST HOSP IP/OBS SF/LOW 40: CPT | Performed by: PEDIATRICS

## 2024-06-21 PROCEDURE — 74181 MRI ABDOMEN W/O CONTRAST: CPT

## 2024-06-21 PROCEDURE — 6360000002 HC RX W HCPCS: Performed by: STUDENT IN AN ORGANIZED HEALTH CARE EDUCATION/TRAINING PROGRAM

## 2024-06-21 PROCEDURE — 2580000003 HC RX 258: Performed by: STUDENT IN AN ORGANIZED HEALTH CARE EDUCATION/TRAINING PROGRAM

## 2024-06-21 PROCEDURE — 2580000003 HC RX 258

## 2024-06-21 PROCEDURE — 96376 TX/PRO/DX INJ SAME DRUG ADON: CPT

## 2024-06-21 PROCEDURE — 6370000000 HC RX 637 (ALT 250 FOR IP): Performed by: STUDENT IN AN ORGANIZED HEALTH CARE EDUCATION/TRAINING PROGRAM

## 2024-06-21 RX ORDER — FAMOTIDINE 20 MG/1
20 TABLET, FILM COATED ORAL 2 TIMES DAILY PRN
Status: DISCONTINUED | OUTPATIENT
Start: 2024-06-21 | End: 2024-06-23

## 2024-06-21 RX ADMIN — CYCLOBENZAPRINE 10 MG: 10 TABLET, FILM COATED ORAL at 11:44

## 2024-06-21 RX ADMIN — PIPERACILLIN AND TAZOBACTAM 3375 MG: 3; .375 INJECTION, POWDER, LYOPHILIZED, FOR SOLUTION INTRAVENOUS at 15:27

## 2024-06-21 RX ADMIN — PIPERACILLIN AND TAZOBACTAM 3375 MG: 3; .375 INJECTION, POWDER, LYOPHILIZED, FOR SOLUTION INTRAVENOUS at 23:50

## 2024-06-21 RX ADMIN — LABETALOL HYDROCHLORIDE 600 MG: 200 TABLET, FILM COATED ORAL at 22:07

## 2024-06-21 RX ADMIN — ACETAMINOPHEN 1000 MG: 500 TABLET ORAL at 11:44

## 2024-06-21 RX ADMIN — NIFEDIPINE 120 MG: 30 TABLET, FILM COATED, EXTENDED RELEASE ORAL at 11:44

## 2024-06-21 RX ADMIN — LABETALOL HYDROCHLORIDE 600 MG: 200 TABLET, FILM COATED ORAL at 14:04

## 2024-06-21 RX ADMIN — LABETALOL HYDROCHLORIDE 600 MG: 200 TABLET, FILM COATED ORAL at 06:37

## 2024-06-21 RX ADMIN — SODIUM CHLORIDE, PRESERVATIVE FREE 20 MG: 5 INJECTION INTRAVENOUS at 22:07

## 2024-06-21 RX ADMIN — MAGNESIUM GLUCONATE 500 MG ORAL TABLET 400 MG: 500 TABLET ORAL at 22:07

## 2024-06-21 RX ADMIN — PIPERACILLIN AND TAZOBACTAM 3375 MG: 3; .375 INJECTION, POWDER, LYOPHILIZED, FOR SOLUTION INTRAVENOUS at 07:21

## 2024-06-21 ASSESSMENT — PAIN SCALES - GENERAL: PAINLEVEL_OUTOF10: 5

## 2024-06-21 NOTE — FLOWSHEET NOTE
Rn at bedside to give pt AM meds. Pt declining at this time. Pt states she wants to wait until after her MRI so she can eat with meds. Residents notified.

## 2024-06-21 NOTE — FLOWSHEET NOTE
Pt back to room from MRI. Pt declining EFM at this time. Pt states she will call out when she's ready for EFM to be reapplied.

## 2024-06-21 NOTE — CONSULTS
At the request of OB, I was asked to do a prenatal consult on Siddharth BATRES Abrahamkyle  regarding premature birth and the associated  complications of a 33w 2d gestation delivery.      Mother is a 28 y.o. year old    female admitted to [facility]  with rt upper quadrant pain.    MOTHER'S HISTORY AND LABS:  Prenatal care: early    Prenatal labs: maternal blood type B neg; Antibody negative  hepatitis B negative; rubella Immune. GBS unknown; T Pallidum non reactive; Chlamydia negative; GC negative; HIV negative;     Tobacco: no tobacco use; Alcohol: no alcohol use; Drug use: denies.   Steroids No   Pregnancy complications: chronic HTN. Maternal antibiotics: Zosyn.  Discussed with mother about the potential need for resuscitation, risk of RDS, need for intubation and surfactant in case of RDS, CPAP, NIV support, need for incubator care, temperature issues, need for IV fluid, feeding issues, importance of breast milk, oral feeding difficulties, jaundice, risk of infection, slow growth and prolonged length of stay    Mother and grand mother were given opportunity to ask questions.  Verbalized understanding of premature birth and the associated  complications.     Time spent 45 min, > 50% spent in face to face counseling of the family    Electronically signed by: Chrissy Frank MD 2024 12:50 PM

## 2024-06-21 NOTE — PLAN OF CARE
Problem: Pain  Goal: Verbalizes/displays adequate comfort level or baseline comfort level  6/21/2024 0906 by Nadia Jean Baptiste, RN  Outcome: Progressing  6/21/2024 0906 by Nadia Jean Baptiste, RN  Outcome: Progressing     Problem: Risk for Maternal Injury  Goal: Remains free from seizures and injury related to seizure activity  Description: INTERVENTIONS:.  -Maintain airway, patient safety and administer oxygen as ordered  -Monitor patient for seizure activity, document and report duration and descrition  -If Seizure occurs, turn patient to dide and suction secretions as needed  -Reorient patient post seizure  -Seizure Pads  -Instruct patient/family to notify RN of any seizure activity  -Instruct patient/family to call for assistance with activity based on assessment  Outcome: Progressing     Problem: Risk for Decreased Cardiac Output  Goal: Maintains optimal cardiac output and hemodynamic stability  Description: INTERVENTIONS:.  -Monitor blood pressure and heart rate  -Monitor urine output and notify licensed practitioner for values outside of   -Assess for signes of decreased cardiac output  -Administer fluid and /or volume expanders as ordered    Outcome: Progressing  Goal: Achieves optimal ventilation and oxygenation  Description: INTERVENTIONS:.  -Assess for changes in respiratory status   -Assess for changes in mentation and behavior  -Position to facilitate oxygenation and minimize respiratory effort  -Oxygen supplementation based on oxygen saturation or arterial blood gases  -Initiate smoking cessation protocol as indicated  -Encourage broncho-pulmonary hygiene including cough, deep breathe, incentive spirometry  -Assess the need for suctioning and aspirate as needed  -Assess and instruct to report shortness of breath or any respiratory difficulty  -Respiratory therapy support as indicated      Outcome: Progressing     Problem: Risk for Deficient Fluid Volume  Goal: Hemodynamic stability and optimal renal function

## 2024-06-21 NOTE — CONSULTS
Bariatric Surgery:    Consult Note        PATIENT NAME: Siddharth Johnsonypmissy   YOB: 1995    ADMISSION DATE: 2024 10:02 PM     Admitting Provider: Dr. Damico    Consulted Physician: Dr. Layne     TODAY'S DATE: 2024    Chief Complaint:  RUQ abdominal pain  Consult Regarding:  Suspected cholecystitic @ 33w2d. Appreciate recommendations/counseling for postpartum vs  management    HISTORY OF PRESENT ILLNESS:  The patient is a 28 y.o. female  who is 33 weeks and 2 days pregnant and was admitted on  with pre-eclampsia. She is status post gastric sleeve in 22 and has lost 100lbs since.  She states for the past few weeks she has been experiencing postprandial RUQ abdominal pain, nausea, and vomiting that has been worsening and becoming more frequent.  She also endorses associated chest pain and shoulder pain.  She denies fever, chills, changes in bowel movements, bloody stools, and dysuria.      Past Medical History:        Diagnosis Date    ADHD (attention deficit hyperactivity disorder)     I was on medication when i was younger but as i got older it my adhd wasnt as bad but I still sometimes have moments on not being able to focus l.    Chronic kidney disease     Depression     Headache 22    From muscle tightness and stiffness in my neck and shoulders    HTN (hypertension)     does not take meds    Infertility, female     Migraine     Obesity     PCOS (polycystic ovarian syndrome)     Dr. De OB/GYN    Wellness examination     Miguelina Minaya/Nadine last visit 2022. appt 2022 for medical clearance       Past Surgical History:        Procedure Laterality Date    SLEEVE GASTRECTOMY N/A 2022    ROBOTIC LAPAROSCOPIC GASTRIC SLEEVE, EGD    SLEEVE GASTRECTOMY N/A 2022    XI ROBOTIC LAPAROSCOPIC GASTRIC SLEEVE, EGD performed by Nathan Layne DO at [facility] OR    UPPER GASTROINTESTINAL ENDOSCOPY N/A 2022    EGD BIOPSY

## 2024-06-22 LAB
ALBUMIN SERPL-MCNC: 2.8 G/DL (ref 3.5–5.2)
ALBUMIN/GLOB SERPL: 1 {RATIO} (ref 1–2.5)
ALP SERPL-CCNC: 145 U/L (ref 35–104)
ALT SERPL-CCNC: 20 U/L (ref 10–35)
ANION GAP SERPL CALCULATED.3IONS-SCNC: 11 MMOL/L (ref 9–16)
AST SERPL-CCNC: 32 U/L (ref 10–35)
BASOPHILS # BLD: 0.04 K/UL (ref 0–0.2)
BASOPHILS NFR BLD: 0 % (ref 0–2)
BILIRUB SERPL-MCNC: 0.8 MG/DL (ref 0–1.2)
BUN SERPL-MCNC: 16 MG/DL (ref 6–20)
CALCIUM SERPL-MCNC: 7.9 MG/DL (ref 8.6–10.4)
CHLORIDE SERPL-SCNC: 109 MMOL/L (ref 98–107)
CO2 SERPL-SCNC: 17 MMOL/L (ref 20–31)
CREAT SERPL-MCNC: 0.6 MG/DL (ref 0.5–0.9)
EOSINOPHIL # BLD: 0.07 K/UL (ref 0–0.44)
EOSINOPHILS RELATIVE PERCENT: 1 % (ref 1–4)
ERYTHROCYTE [DISTWIDTH] IN BLOOD BY AUTOMATED COUNT: 13.4 % (ref 11.8–14.4)
GFR, ESTIMATED: >90 ML/MIN/1.73M2
GLUCOSE SERPL-MCNC: 68 MG/DL (ref 74–99)
HCT VFR BLD AUTO: 34.2 % (ref 36.3–47.1)
HGB BLD-MCNC: 11.2 G/DL (ref 11.9–15.1)
IMM GRANULOCYTES # BLD AUTO: 0.11 K/UL (ref 0–0.3)
IMM GRANULOCYTES NFR BLD: 1 %
LYMPHOCYTES NFR BLD: 1.1 K/UL (ref 1.1–3.7)
LYMPHOCYTES RELATIVE PERCENT: 11 % (ref 24–43)
MCH RBC QN AUTO: 28.9 PG (ref 25.2–33.5)
MCHC RBC AUTO-ENTMCNC: 32.7 G/DL (ref 28.4–34.8)
MCV RBC AUTO: 88.1 FL (ref 82.6–102.9)
MONOCYTES NFR BLD: 0.69 K/UL (ref 0.1–1.2)
MONOCYTES NFR BLD: 7 % (ref 3–12)
NEUTROPHILS NFR BLD: 80 % (ref 36–65)
NEUTS SEG NFR BLD: 7.7 K/UL (ref 1.5–8.1)
NRBC BLD-RTO: 0 PER 100 WBC
PLATELET # BLD AUTO: 120 K/UL (ref 138–453)
PMV BLD AUTO: 10.2 FL (ref 8.1–13.5)
POTASSIUM SERPL-SCNC: 4 MMOL/L (ref 3.7–5.3)
PROT SERPL-MCNC: 5.5 G/DL (ref 6.6–8.7)
RBC # BLD AUTO: 3.88 M/UL (ref 3.95–5.11)
SODIUM SERPL-SCNC: 137 MMOL/L (ref 136–145)
WBC OTHER # BLD: 9.7 K/UL (ref 3.5–11.3)

## 2024-06-22 PROCEDURE — 6370000000 HC RX 637 (ALT 250 FOR IP): Performed by: STUDENT IN AN ORGANIZED HEALTH CARE EDUCATION/TRAINING PROGRAM

## 2024-06-22 PROCEDURE — 2580000003 HC RX 258

## 2024-06-22 PROCEDURE — 6360000002 HC RX W HCPCS: Performed by: STUDENT IN AN ORGANIZED HEALTH CARE EDUCATION/TRAINING PROGRAM

## 2024-06-22 PROCEDURE — 2500000003 HC RX 250 WO HCPCS

## 2024-06-22 PROCEDURE — 6370000000 HC RX 637 (ALT 250 FOR IP)

## 2024-06-22 PROCEDURE — 36415 COLL VENOUS BLD VENIPUNCTURE: CPT

## 2024-06-22 PROCEDURE — 1220000000 HC SEMI PRIVATE OB R&B

## 2024-06-22 PROCEDURE — 2580000003 HC RX 258: Performed by: STUDENT IN AN ORGANIZED HEALTH CARE EDUCATION/TRAINING PROGRAM

## 2024-06-22 PROCEDURE — 99231 SBSQ HOSP IP/OBS SF/LOW 25: CPT | Performed by: OBSTETRICS & GYNECOLOGY

## 2024-06-22 PROCEDURE — 85025 COMPLETE CBC W/AUTO DIFF WBC: CPT

## 2024-06-22 PROCEDURE — 6360000002 HC RX W HCPCS

## 2024-06-22 PROCEDURE — 80053 COMPREHEN METABOLIC PANEL: CPT

## 2024-06-22 PROCEDURE — 87081 CULTURE SCREEN ONLY: CPT

## 2024-06-22 PROCEDURE — 99232 SBSQ HOSP IP/OBS MODERATE 35: CPT | Performed by: SURGERY

## 2024-06-22 RX ORDER — BETAMETHASONE SODIUM PHOSPHATE AND BETAMETHASONE ACETATE 3; 3 MG/ML; MG/ML
12 INJECTION, SUSPENSION INTRA-ARTICULAR; INTRALESIONAL; INTRAMUSCULAR; SOFT TISSUE EVERY 24 HOURS
Status: COMPLETED | OUTPATIENT
Start: 2024-06-22 | End: 2024-06-23

## 2024-06-22 RX ORDER — LABETALOL 200 MG/1
800 TABLET, FILM COATED ORAL EVERY 8 HOURS SCHEDULED
Status: DISCONTINUED | OUTPATIENT
Start: 2024-06-22 | End: 2024-06-23 | Stop reason: HOSPADM

## 2024-06-22 RX ORDER — LABETALOL 200 MG/1
800 TABLET, FILM COATED ORAL 3 TIMES DAILY
Qty: 360 TABLET | Refills: 0 | Status: SHIPPED | OUTPATIENT
Start: 2024-06-22 | End: 2024-06-23 | Stop reason: HOSPADM

## 2024-06-22 RX ADMIN — CYCLOBENZAPRINE 10 MG: 10 TABLET, FILM COATED ORAL at 06:45

## 2024-06-22 RX ADMIN — PIPERACILLIN AND TAZOBACTAM 3375 MG: 3; .375 INJECTION, POWDER, LYOPHILIZED, FOR SOLUTION INTRAVENOUS at 16:05

## 2024-06-22 RX ADMIN — SODIUM CHLORIDE, PRESERVATIVE FREE 20 MG: 5 INJECTION INTRAVENOUS at 22:01

## 2024-06-22 RX ADMIN — ACETAMINOPHEN 1000 MG: 500 TABLET ORAL at 06:45

## 2024-06-22 RX ADMIN — LABETALOL HYDROCHLORIDE 800 MG: 200 TABLET, FILM COATED ORAL at 14:13

## 2024-06-22 RX ADMIN — SODIUM CHLORIDE, PRESERVATIVE FREE 20 MG: 5 INJECTION INTRAVENOUS at 12:51

## 2024-06-22 RX ADMIN — PIPERACILLIN AND TAZOBACTAM 3375 MG: 3; .375 INJECTION, POWDER, LYOPHILIZED, FOR SOLUTION INTRAVENOUS at 07:43

## 2024-06-22 RX ADMIN — BETAMETHASONE SODIUM PHOSPHATE AND BETAMETHASONE ACETATE 12 MG: 3; 3 INJECTION, SUSPENSION INTRA-ARTICULAR; INTRALESIONAL; INTRAMUSCULAR at 12:49

## 2024-06-22 RX ADMIN — LABETALOL HYDROCHLORIDE 800 MG: 200 TABLET, FILM COATED ORAL at 22:00

## 2024-06-22 RX ADMIN — NIFEDIPINE 120 MG: 30 TABLET, FILM COATED, EXTENDED RELEASE ORAL at 08:51

## 2024-06-22 RX ADMIN — MAGNESIUM GLUCONATE 500 MG ORAL TABLET 400 MG: 500 TABLET ORAL at 22:00

## 2024-06-22 RX ADMIN — LABETALOL HYDROCHLORIDE 800 MG: 200 TABLET, FILM COATED ORAL at 06:01

## 2024-06-22 ASSESSMENT — PAIN SCALES - GENERAL
PAINLEVEL_OUTOF10: 6
PAINLEVEL_OUTOF10: 2

## 2024-06-22 ASSESSMENT — PAIN DESCRIPTION - LOCATION: LOCATION: BACK;SHOULDER

## 2024-06-22 ASSESSMENT — PAIN DESCRIPTION - DESCRIPTORS: DESCRIPTORS: ACHING

## 2024-06-23 VITALS
DIASTOLIC BLOOD PRESSURE: 82 MMHG | RESPIRATION RATE: 15 BRPM | HEART RATE: 84 BPM | SYSTOLIC BLOOD PRESSURE: 126 MMHG | TEMPERATURE: 98.2 F | OXYGEN SATURATION: 96 %

## 2024-06-23 PROCEDURE — 6370000000 HC RX 637 (ALT 250 FOR IP): Performed by: STUDENT IN AN ORGANIZED HEALTH CARE EDUCATION/TRAINING PROGRAM

## 2024-06-23 PROCEDURE — 99232 SBSQ HOSP IP/OBS MODERATE 35: CPT | Performed by: SURGERY

## 2024-06-23 PROCEDURE — 6360000002 HC RX W HCPCS

## 2024-06-23 PROCEDURE — 2580000003 HC RX 258: Performed by: STUDENT IN AN ORGANIZED HEALTH CARE EDUCATION/TRAINING PROGRAM

## 2024-06-23 PROCEDURE — 6360000002 HC RX W HCPCS: Performed by: STUDENT IN AN ORGANIZED HEALTH CARE EDUCATION/TRAINING PROGRAM

## 2024-06-23 RX ORDER — ONDANSETRON 4 MG/1
4 TABLET, ORALLY DISINTEGRATING ORAL EVERY 8 HOURS PRN
Qty: 9 TABLET | Refills: 0 | Status: SHIPPED | OUTPATIENT
Start: 2024-06-23 | End: 2024-06-26

## 2024-06-23 RX ORDER — LABETALOL 200 MG/1
800 TABLET, FILM COATED ORAL 3 TIMES DAILY
Qty: 360 TABLET | Refills: 0 | Status: SHIPPED | OUTPATIENT
Start: 2024-06-23 | End: 2024-06-23

## 2024-06-23 RX ORDER — CYCLOBENZAPRINE HCL 10 MG
10 TABLET ORAL 3 TIMES DAILY PRN
Qty: 30 TABLET | Refills: 0 | Status: SHIPPED | OUTPATIENT
Start: 2024-06-23 | End: 2024-07-03

## 2024-06-23 RX ORDER — LIDOCAINE 4 G/G
1 PATCH TOPICAL DAILY
Qty: 3 EACH | Refills: 0 | Status: ON HOLD | OUTPATIENT
Start: 2024-06-24 | End: 2024-06-24 | Stop reason: HOSPADM

## 2024-06-23 RX ORDER — LABETALOL 200 MG/1
800 TABLET, FILM COATED ORAL 3 TIMES DAILY
Qty: 360 TABLET | Refills: 0 | Status: ON HOLD | OUTPATIENT
Start: 2024-06-23 | End: 2024-06-24 | Stop reason: HOSPADM

## 2024-06-23 RX ADMIN — BETAMETHASONE SODIUM PHOSPHATE AND BETAMETHASONE ACETATE 12 MG: 3; 3 INJECTION, SUSPENSION INTRA-ARTICULAR; INTRALESIONAL; INTRAMUSCULAR at 13:53

## 2024-06-23 RX ADMIN — PIPERACILLIN AND TAZOBACTAM 3375 MG: 3; .375 INJECTION, POWDER, LYOPHILIZED, FOR SOLUTION INTRAVENOUS at 00:12

## 2024-06-23 RX ADMIN — NIFEDIPINE 120 MG: 30 TABLET, FILM COATED, EXTENDED RELEASE ORAL at 08:57

## 2024-06-23 RX ADMIN — PIPERACILLIN AND TAZOBACTAM 3375 MG: 3; .375 INJECTION, POWDER, LYOPHILIZED, FOR SOLUTION INTRAVENOUS at 08:16

## 2024-06-23 RX ADMIN — LABETALOL HYDROCHLORIDE 800 MG: 200 TABLET, FILM COATED ORAL at 06:38

## 2024-06-23 RX ADMIN — LABETALOL HYDROCHLORIDE 800 MG: 200 TABLET, FILM COATED ORAL at 14:08

## 2024-06-23 NOTE — FLOWSHEET NOTE
At 1923 the Pt requested that writer get a quick heart beat on baby for peace of mind but does not want to be put on continuous monitoring yet.

## 2024-06-23 NOTE — CARE COORDINATION
VM received from April in the pharmacy regarding patient's labetalol.  Insurance only allows 8 per day to be dispensed and they are askiung for a PA.    Cover my meds key: [id number]  PA completed and today's note documentation attached.    Also med sheet for last three days showing increase dosing that was needed.    Attached and sent to [facility] per CMM.   Per Dr Debbie hurtado to change to different dose tablets to equal 800 mg.

## 2024-06-23 NOTE — DISCHARGE INSTRUCTIONS
Notify Physician for:       *  Regular contractions that are  5 minutes apart or less lasting longer than 1 hr for 1st baby, 10 mins apart or less if 2nd baby or greater.     *  Leaking or gush of fluid from vagina.     *  Any vaginal bleeding that is heavier than a menstrual period.     *  Decrease or absence of baby movement.     *  Vaginal pressure, low backache.     *  Vomiting or diarrhea for several hours, and unable to take in/ keep fluids or food down.     *  Increase or change in vaginal discharge.     *  Abdominal or menstrual- like cramping that is constant or intermittent.     *  Fever and/ or chills.                    *  Headache              *  Blurred and or visual changes-  (spots before eyes, \"floaters\")              *  Upper abdominal/ epigastric pain  Activities:       As tolerated, frequent rest                      Diet:          As previous, nothing to eat or drink after midnight the day of surgery, 6/25    Drink 10- 12 glasses of water daily.    Be sure to keep next scheduled appt, and call your Dr. With any questions    Shower night before and day of surgery with soap provided
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
## 2024-06-24 ENCOUNTER — ROUTINE PRENATAL (OUTPATIENT)
Dept: PERINATAL CARE | Age: 29
End: 2024-06-24
Payer: COMMERCIAL

## 2024-06-24 ENCOUNTER — ANESTHESIA EVENT (OUTPATIENT)
Dept: OPERATING ROOM | Age: 29
End: 2024-06-24
Payer: COMMERCIAL

## 2024-06-24 ENCOUNTER — ANESTHESIA (OUTPATIENT)
Dept: OPERATING ROOM | Age: 29
End: 2024-06-24
Payer: COMMERCIAL

## 2024-06-24 ENCOUNTER — ANESTHESIA (OUTPATIENT)
Dept: LABOR AND DELIVERY | Age: 29
End: 2024-06-24
Payer: COMMERCIAL

## 2024-06-24 ENCOUNTER — HOSPITAL ENCOUNTER (INPATIENT)
Age: 29
LOS: 4 days | Discharge: HOME | End: 2024-06-28
Attending: STUDENT IN AN ORGANIZED HEALTH CARE EDUCATION/TRAINING PROGRAM | Admitting: OBSTETRICS & GYNECOLOGY
Payer: COMMERCIAL

## 2024-06-24 ENCOUNTER — ANESTHESIA EVENT (OUTPATIENT)
Dept: LABOR AND DELIVERY | Age: 29
End: 2024-06-24
Payer: COMMERCIAL

## 2024-06-24 VITALS
WEIGHT: 199 LBS | HEART RATE: 68 BPM | HEIGHT: 57 IN | BODY MASS INDEX: 42.93 KG/M2 | SYSTOLIC BLOOD PRESSURE: 137 MMHG | DIASTOLIC BLOOD PRESSURE: 72 MMHG | TEMPERATURE: 97.2 F | RESPIRATION RATE: 16 BRPM

## 2024-06-24 DIAGNOSIS — O99.213 OBESITY AFFECTING PREGNANCY IN THIRD TRIMESTER, UNSPECIFIED OBESITY TYPE: ICD-10-CM

## 2024-06-24 DIAGNOSIS — Z3A.33 33 WEEKS GESTATION OF PREGNANCY: ICD-10-CM

## 2024-06-24 DIAGNOSIS — Z98.891 H/O CESAREAN SECTION: Primary | ICD-10-CM

## 2024-06-24 DIAGNOSIS — O41.00X0 OLIGOHYDRAMNIOS, ANTEPARTUM, SINGLE OR UNSPECIFIED FETUS: ICD-10-CM

## 2024-06-24 DIAGNOSIS — O99.843 PREGNANCY AFFECTED BY PREVIOUS BARIATRIC SURGERY, CURRENTLY IN THIRD TRIMESTER: ICD-10-CM

## 2024-06-24 DIAGNOSIS — O36.5930 INTRAUTERINE GROWTH RESTRICTION (IUGR) AFFECTING CARE OF MOTHER, THIRD TRIMESTER, SINGLE GESTATION: Primary | ICD-10-CM

## 2024-06-24 DIAGNOSIS — O35.9XX0 FETAL ABNORMALITY AFFECTING MANAGEMENT OF MOTHER, SINGLE OR UNSPECIFIED FETUS: ICD-10-CM

## 2024-06-24 DIAGNOSIS — O16.3 HYPERTENSION AFFECTING PREGNANCY IN THIRD TRIMESTER: ICD-10-CM

## 2024-06-24 PROBLEM — O11.9 CHRONIC HYPERTENSION WITH SUPERIMPOSED PREECLAMPSIA: Status: ACTIVE | Noted: 2024-06-24

## 2024-06-24 PROBLEM — K21.9 GASTROESOPHAGEAL REFLUX DISEASE WITHOUT ESOPHAGITIS: Status: ACTIVE | Noted: 2024-06-24

## 2024-06-24 LAB
ABO + RH BLD: NORMAL
ALBUMIN SERPL-MCNC: 3.3 G/DL (ref 3.5–5.2)
ALBUMIN/GLOB SERPL: 2 {RATIO} (ref 1–2.5)
ALP SERPL-CCNC: 148 U/L (ref 35–104)
ALT SERPL-CCNC: 97 U/L (ref 10–35)
AMPHET UR QL SCN: POSITIVE
ANION GAP SERPL CALCULATED.3IONS-SCNC: 12 MMOL/L (ref 9–16)
ARM BAND NUMBER: NORMAL
AST SERPL-CCNC: 91 U/L (ref 10–35)
BARBITURATES UR QL SCN: NEGATIVE
BENZODIAZ UR QL: NEGATIVE
BILIRUB SERPL-MCNC: 0.3 MG/DL (ref 0–1.2)
BLOOD BANK SAMPLE EXPIRATION: NORMAL
BLOOD GROUP ANTIBODIES SERPL: NEGATIVE
BUN SERPL-MCNC: 19 MG/DL (ref 6–20)
CALCIUM SERPL-MCNC: 8.2 MG/DL (ref 8.6–10.4)
CANNABINOIDS UR QL SCN: NEGATIVE
CHLORIDE SERPL-SCNC: 109 MMOL/L (ref 98–107)
CO2 SERPL-SCNC: 18 MMOL/L (ref 20–31)
COCAINE UR QL SCN: NEGATIVE
CREAT SERPL-MCNC: 0.8 MG/DL (ref 0.5–0.9)
CREAT UR-MCNC: 48.8 MG/DL (ref 28–217)
ERYTHROCYTE [DISTWIDTH] IN BLOOD BY AUTOMATED COUNT: 13.7 % (ref 11.8–14.4)
ERYTHROCYTE [DISTWIDTH] IN BLOOD BY AUTOMATED COUNT: 13.8 % (ref 11.8–14.4)
FENTANYL UR QL: NEGATIVE
GFR, ESTIMATED: >90 ML/MIN/1.73M2
GLUCOSE SERPL-MCNC: 107 MG/DL (ref 74–99)
HCT VFR BLD AUTO: 31 % (ref 36.3–47.1)
HCT VFR BLD AUTO: 32 % (ref 36.3–47.1)
HGB BLD-MCNC: 10 G/DL (ref 11.9–15.1)
HGB BLD-MCNC: 10.8 G/DL (ref 11.9–15.1)
MAGNESIUM SERPL-MCNC: 6 MG/DL (ref 1.6–2.6)
MCH RBC QN AUTO: 29 PG (ref 25.2–33.5)
MCH RBC QN AUTO: 29.1 PG (ref 25.2–33.5)
MCHC RBC AUTO-ENTMCNC: 32.3 G/DL (ref 28.4–34.8)
MCHC RBC AUTO-ENTMCNC: 33.8 G/DL (ref 28.4–34.8)
MCV RBC AUTO: 85.8 FL (ref 82.6–102.9)
MCV RBC AUTO: 90.1 FL (ref 82.6–102.9)
METHADONE UR QL: NEGATIVE
NRBC BLD-RTO: 0 PER 100 WBC
NRBC BLD-RTO: 0 PER 100 WBC
OPIATES UR QL SCN: NEGATIVE
OXYCODONE UR QL SCN: NEGATIVE
PCP UR QL SCN: NEGATIVE
PLATELET # BLD AUTO: 135 K/UL (ref 138–453)
PLATELET # BLD AUTO: 152 K/UL (ref 138–453)
PMV BLD AUTO: 10.6 FL (ref 8.1–13.5)
PMV BLD AUTO: 10.8 FL (ref 8.1–13.5)
POTASSIUM SERPL-SCNC: 4.1 MMOL/L (ref 3.7–5.3)
PROT SERPL-MCNC: 5.4 G/DL (ref 6.6–8.7)
RBC # BLD AUTO: 3.44 M/UL (ref 3.95–5.11)
RBC # BLD AUTO: 3.73 M/UL (ref 3.95–5.11)
SODIUM SERPL-SCNC: 139 MMOL/L (ref 136–145)
T PALLIDUM AB SER QL IA: NONREACTIVE
TEST INFORMATION: ABNORMAL
TOTAL PROTEIN, URINE: 920 MG/DL
URINE TOTAL PROTEIN CREATININE RATIO: 18.85 (ref 0–0.2)
WBC OTHER # BLD: 16.6 K/UL (ref 3.5–11.3)
WBC OTHER # BLD: 9.7 K/UL (ref 3.5–11.3)

## 2024-06-24 PROCEDURE — 59510 CESAREAN DELIVERY: CPT | Performed by: OBSTETRICS & GYNECOLOGY

## 2024-06-24 PROCEDURE — 2580000003 HC RX 258

## 2024-06-24 PROCEDURE — 86850 RBC ANTIBODY SCREEN: CPT

## 2024-06-24 PROCEDURE — 6360000002 HC RX W HCPCS

## 2024-06-24 PROCEDURE — 82570 ASSAY OF URINE CREATININE: CPT

## 2024-06-24 PROCEDURE — 3700000001 HC ADD 15 MINUTES (ANESTHESIA): Performed by: OBSTETRICS & GYNECOLOGY

## 2024-06-24 PROCEDURE — 3700000000 HC ANESTHESIA ATTENDED CARE: Performed by: OBSTETRICS & GYNECOLOGY

## 2024-06-24 PROCEDURE — 86900 BLOOD TYPING SEROLOGIC ABO: CPT

## 2024-06-24 PROCEDURE — 3609079900 HC CESAREAN SECTION: Performed by: OBSTETRICS & GYNECOLOGY

## 2024-06-24 PROCEDURE — 6370000000 HC RX 637 (ALT 250 FOR IP)

## 2024-06-24 PROCEDURE — 2720000010 HC SURG SUPPLY STERILE: Performed by: OBSTETRICS & GYNECOLOGY

## 2024-06-24 PROCEDURE — 2500000003 HC RX 250 WO HCPCS

## 2024-06-24 PROCEDURE — 76821 MIDDLE CEREBRAL ARTERY ECHO: CPT | Performed by: OBSTETRICS & GYNECOLOGY

## 2024-06-24 PROCEDURE — 86780 TREPONEMA PALLIDUM: CPT

## 2024-06-24 PROCEDURE — 84156 ASSAY OF PROTEIN URINE: CPT

## 2024-06-24 PROCEDURE — 80053 COMPREHEN METABOLIC PANEL: CPT

## 2024-06-24 PROCEDURE — 36415 COLL VENOUS BLD VENIPUNCTURE: CPT

## 2024-06-24 PROCEDURE — 76819 FETAL BIOPHYS PROFIL W/O NST: CPT | Performed by: OBSTETRICS & GYNECOLOGY

## 2024-06-24 PROCEDURE — 1220000000 HC SEMI PRIVATE OB R&B

## 2024-06-24 PROCEDURE — 99999 PR OFFICE/OUTPT VISIT,PROCEDURE ONLY: CPT | Performed by: OBSTETRICS & GYNECOLOGY

## 2024-06-24 PROCEDURE — 2709999900 HC NON-CHARGEABLE SUPPLY: Performed by: OBSTETRICS & GYNECOLOGY

## 2024-06-24 PROCEDURE — 76820 UMBILICAL ARTERY ECHO: CPT | Performed by: OBSTETRICS & GYNECOLOGY

## 2024-06-24 PROCEDURE — 7100000000 HC PACU RECOVERY - FIRST 15 MIN: Performed by: OBSTETRICS & GYNECOLOGY

## 2024-06-24 PROCEDURE — 7100000001 HC PACU RECOVERY - ADDTL 15 MIN: Performed by: OBSTETRICS & GYNECOLOGY

## 2024-06-24 PROCEDURE — 80307 DRUG TEST PRSMV CHEM ANLYZR: CPT

## 2024-06-24 PROCEDURE — 83735 ASSAY OF MAGNESIUM: CPT

## 2024-06-24 PROCEDURE — 99465 NB RESUSCITATION: CPT

## 2024-06-24 PROCEDURE — 88307 TISSUE EXAM BY PATHOLOGIST: CPT

## 2024-06-24 PROCEDURE — 85027 COMPLETE CBC AUTOMATED: CPT

## 2024-06-24 PROCEDURE — 86901 BLOOD TYPING SEROLOGIC RH(D): CPT

## 2024-06-24 RX ORDER — PROCHLORPERAZINE EDISYLATE 5 MG/ML
10 INJECTION INTRAMUSCULAR; INTRAVENOUS ONCE
Status: COMPLETED | OUTPATIENT
Start: 2024-06-24 | End: 2024-06-24

## 2024-06-24 RX ORDER — ENOXAPARIN SODIUM 100 MG/ML
0.5 INJECTION SUBCUTANEOUS DAILY
Status: DISCONTINUED | OUTPATIENT
Start: 2024-06-24 | End: 2024-06-28 | Stop reason: HOSPADM

## 2024-06-24 RX ORDER — SODIUM CHLORIDE, SODIUM LACTATE, POTASSIUM CHLORIDE, CALCIUM CHLORIDE 600; 310; 30; 20 MG/100ML; MG/100ML; MG/100ML; MG/100ML
INJECTION, SOLUTION INTRAVENOUS CONTINUOUS
Status: DISCONTINUED | OUTPATIENT
Start: 2024-06-24 | End: 2024-06-25

## 2024-06-24 RX ORDER — SODIUM CHLORIDE, SODIUM LACTATE, POTASSIUM CHLORIDE, CALCIUM CHLORIDE 600; 310; 30; 20 MG/100ML; MG/100ML; MG/100ML; MG/100ML
INJECTION, SOLUTION INTRAVENOUS CONTINUOUS
Status: DISCONTINUED | OUTPATIENT
Start: 2024-06-24 | End: 2024-06-24

## 2024-06-24 RX ORDER — MAGNESIUM SULFATE HEPTAHYDRATE 40 MG/ML
4000 INJECTION, SOLUTION INTRAVENOUS ONCE
Status: COMPLETED | OUTPATIENT
Start: 2024-06-24 | End: 2024-06-24

## 2024-06-24 RX ORDER — HYDROXYZINE HYDROCHLORIDE 25 MG/1
25 TABLET, FILM COATED ORAL 3 TIMES DAILY PRN
Status: DISCONTINUED | OUTPATIENT
Start: 2024-06-24 | End: 2024-06-28 | Stop reason: HOSPADM

## 2024-06-24 RX ORDER — ONDANSETRON 2 MG/ML
4 INJECTION INTRAMUSCULAR; INTRAVENOUS EVERY 6 HOURS PRN
Status: DISCONTINUED | OUTPATIENT
Start: 2024-06-24 | End: 2024-06-24

## 2024-06-24 RX ORDER — SODIUM CHLORIDE 0.9 % (FLUSH) 0.9 %
10 SYRINGE (ML) INJECTION EVERY 12 HOURS SCHEDULED
Status: DISCONTINUED | OUTPATIENT
Start: 2024-06-24 | End: 2024-06-28 | Stop reason: HOSPADM

## 2024-06-24 RX ORDER — OXYCODONE HYDROCHLORIDE 5 MG/1
10 TABLET ORAL EVERY 4 HOURS PRN
Status: DISCONTINUED | OUTPATIENT
Start: 2024-06-24 | End: 2024-06-28 | Stop reason: HOSPADM

## 2024-06-24 RX ORDER — PROPOFOL 10 MG/ML
INJECTION, EMULSION INTRAVENOUS PRN
Status: DISCONTINUED | OUTPATIENT
Start: 2024-06-24 | End: 2024-06-24 | Stop reason: SDUPTHER

## 2024-06-24 RX ORDER — SODIUM CHLORIDE 9 MG/ML
INJECTION, SOLUTION INTRAVENOUS PRN
Status: DISCONTINUED | OUTPATIENT
Start: 2024-06-24 | End: 2024-06-28 | Stop reason: HOSPADM

## 2024-06-24 RX ORDER — SUCCINYLCHOLINE/SOD CL,ISO/PF 100 MG/5ML
SYRINGE (ML) INTRAVENOUS PRN
Status: DISCONTINUED | OUTPATIENT
Start: 2024-06-24 | End: 2024-06-24 | Stop reason: SDUPTHER

## 2024-06-24 RX ORDER — AMOXICILLIN 250 MG
1 CAPSULE ORAL DAILY
Qty: 30 TABLET | Refills: 0 | Status: SHIPPED | OUTPATIENT
Start: 2024-06-24

## 2024-06-24 RX ORDER — VITAMIN A, ASCORBIC ACID, CHOLECALCIFEROL, .ALPHA.-TOCOPHEROL ACETATE, DL-, THIAMINE MONONITRATE, RIBOFLAVIN, NIACINAMIDE, PYRIDOXINE HYDROCHLORIDE, FOLIC ACID, CYANOCOBALAMIN, CALCIUM CARBONATE, IRON, ZINC OXIDE, AND CUPRIC OXIDE 4000; 120; 400; 22; 1.84; 3; 20; 10; 1; 12; 200; 29; 25; 2 [IU]/1; MG/1; [IU]/1; [IU]/1; MG/1; MG/1; MG/1; MG/1; MG/1; UG/1; MG/1; MG/1; MG/1; MG/1
1 TABLET ORAL DAILY
Status: DISCONTINUED | OUTPATIENT
Start: 2024-06-24 | End: 2024-06-28 | Stop reason: HOSPADM

## 2024-06-24 RX ORDER — SODIUM CHLORIDE, SODIUM LACTATE, POTASSIUM CHLORIDE, AND CALCIUM CHLORIDE .6; .31; .03; .02 G/100ML; G/100ML; G/100ML; G/100ML
1000 INJECTION, SOLUTION INTRAVENOUS ONCE
Status: COMPLETED | OUTPATIENT
Start: 2024-06-24 | End: 2024-06-24

## 2024-06-24 RX ORDER — MIDAZOLAM HYDROCHLORIDE 1 MG/ML
INJECTION INTRAMUSCULAR; INTRAVENOUS PRN
Status: DISCONTINUED | OUTPATIENT
Start: 2024-06-24 | End: 2024-06-24 | Stop reason: SDUPTHER

## 2024-06-24 RX ORDER — ACETAMINOPHEN 500 MG
1000 TABLET ORAL EVERY 6 HOURS
Status: DISCONTINUED | OUTPATIENT
Start: 2024-06-24 | End: 2024-06-28 | Stop reason: HOSPADM

## 2024-06-24 RX ORDER — ACETAMINOPHEN 500 MG
500 TABLET ORAL 4 TIMES DAILY PRN
Qty: 30 TABLET | Refills: 1 | Status: SHIPPED | OUTPATIENT
Start: 2024-06-24

## 2024-06-24 RX ORDER — SCOLOPAMINE TRANSDERMAL SYSTEM 1 MG/1
1 PATCH, EXTENDED RELEASE TRANSDERMAL ONCE
Status: COMPLETED | OUTPATIENT
Start: 2024-06-24 | End: 2024-06-27

## 2024-06-24 RX ORDER — ONDANSETRON 4 MG/1
4 TABLET, ORALLY DISINTEGRATING ORAL EVERY 8 HOURS PRN
Status: DISCONTINUED | OUTPATIENT
Start: 2024-06-24 | End: 2024-06-28 | Stop reason: HOSPADM

## 2024-06-24 RX ORDER — SENNA AND DOCUSATE SODIUM 50; 8.6 MG/1; MG/1
1 TABLET, FILM COATED ORAL 2 TIMES DAILY
Status: DISCONTINUED | OUTPATIENT
Start: 2024-06-24 | End: 2024-06-28 | Stop reason: HOSPADM

## 2024-06-24 RX ORDER — LANOLIN 72 %
OINTMENT (GRAM) TOPICAL
Status: DISCONTINUED | OUTPATIENT
Start: 2024-06-24 | End: 2024-06-28 | Stop reason: HOSPADM

## 2024-06-24 RX ORDER — SODIUM CHLORIDE 0.9 % (FLUSH) 0.9 %
5-40 SYRINGE (ML) INJECTION PRN
Status: DISCONTINUED | OUTPATIENT
Start: 2024-06-24 | End: 2024-06-28 | Stop reason: HOSPADM

## 2024-06-24 RX ORDER — OXYCODONE HYDROCHLORIDE 5 MG/1
5 TABLET ORAL EVERY 4 HOURS PRN
Status: DISCONTINUED | OUTPATIENT
Start: 2024-06-24 | End: 2024-06-28 | Stop reason: HOSPADM

## 2024-06-24 RX ORDER — CEPHALEXIN 500 MG/1
500 CAPSULE ORAL EVERY 8 HOURS SCHEDULED
Status: DISPENSED | OUTPATIENT
Start: 2024-06-24 | End: 2024-06-26

## 2024-06-24 RX ORDER — CALCIUM GLUCONATE 94 MG/ML
1000 INJECTION, SOLUTION INTRAVENOUS PRN
Status: DISCONTINUED | OUTPATIENT
Start: 2024-06-24 | End: 2024-06-28 | Stop reason: HOSPADM

## 2024-06-24 RX ORDER — METRONIDAZOLE 500 MG/1
500 TABLET ORAL EVERY 8 HOURS SCHEDULED
Status: DISPENSED | OUTPATIENT
Start: 2024-06-24 | End: 2024-06-26

## 2024-06-24 RX ORDER — TRANEXAMIC ACID 10 MG/ML
1000 INJECTION, SOLUTION INTRAVENOUS ONCE
Status: COMPLETED | OUTPATIENT
Start: 2024-06-24 | End: 2024-06-24

## 2024-06-24 RX ORDER — GABAPENTIN 300 MG/1
300 CAPSULE ORAL 3 TIMES DAILY PRN
Qty: 21 CAPSULE | Refills: 0 | Status: SHIPPED | OUTPATIENT
Start: 2024-06-24 | End: 2024-07-01

## 2024-06-24 RX ORDER — CYCLOBENZAPRINE HCL 10 MG
10 TABLET ORAL 3 TIMES DAILY PRN
Status: DISCONTINUED | OUTPATIENT
Start: 2024-06-24 | End: 2024-06-28 | Stop reason: HOSPADM

## 2024-06-24 RX ORDER — LIDOCAINE HYDROCHLORIDE 10 MG/ML
INJECTION, SOLUTION EPIDURAL; INFILTRATION; INTRACAUDAL; PERINEURAL PRN
Status: DISCONTINUED | OUTPATIENT
Start: 2024-06-24 | End: 2024-06-24 | Stop reason: SDUPTHER

## 2024-06-24 RX ORDER — LABETALOL HYDROCHLORIDE 5 MG/ML
20 INJECTION, SOLUTION INTRAVENOUS ONCE
Status: DISCONTINUED | OUTPATIENT
Start: 2024-06-24 | End: 2024-06-24

## 2024-06-24 RX ORDER — SODIUM CHLORIDE 0.9 % (FLUSH) 0.9 %
10 SYRINGE (ML) INJECTION EVERY 12 HOURS SCHEDULED
Status: DISCONTINUED | OUTPATIENT
Start: 2024-06-24 | End: 2024-06-24

## 2024-06-24 RX ORDER — LANOLIN ALCOHOL/MO/W.PET/CERES
400 CREAM (GRAM) TOPICAL NIGHTLY
Status: DISCONTINUED | OUTPATIENT
Start: 2024-06-24 | End: 2024-06-24

## 2024-06-24 RX ORDER — ACETAMINOPHEN 500 MG
1000 TABLET ORAL ONCE
Status: COMPLETED | OUTPATIENT
Start: 2024-06-24 | End: 2024-06-24

## 2024-06-24 RX ORDER — HYDRALAZINE HYDROCHLORIDE 20 MG/ML
10 INJECTION INTRAMUSCULAR; INTRAVENOUS ONCE
Status: COMPLETED | OUTPATIENT
Start: 2024-06-24 | End: 2024-06-24

## 2024-06-24 RX ORDER — NIFEDIPINE 30 MG/1
120 TABLET, EXTENDED RELEASE ORAL DAILY
Status: DISCONTINUED | OUTPATIENT
Start: 2024-06-25 | End: 2024-06-28 | Stop reason: HOSPADM

## 2024-06-24 RX ORDER — SODIUM CHLORIDE 0.9 % (FLUSH) 0.9 %
10 SYRINGE (ML) INJECTION PRN
Status: DISCONTINUED | OUTPATIENT
Start: 2024-06-24 | End: 2024-06-28 | Stop reason: HOSPADM

## 2024-06-24 RX ORDER — KETOROLAC TROMETHAMINE 30 MG/ML
30 INJECTION, SOLUTION INTRAMUSCULAR; INTRAVENOUS EVERY 6 HOURS
Status: COMPLETED | OUTPATIENT
Start: 2024-06-24 | End: 2024-06-25

## 2024-06-24 RX ORDER — DEXAMETHASONE SODIUM PHOSPHATE 10 MG/ML
INJECTION INTRAMUSCULAR; INTRAVENOUS PRN
Status: DISCONTINUED | OUTPATIENT
Start: 2024-06-24 | End: 2024-06-24 | Stop reason: SDUPTHER

## 2024-06-24 RX ORDER — SIMETHICONE 80 MG
80 TABLET,CHEWABLE ORAL EVERY 6 HOURS PRN
Status: DISCONTINUED | OUTPATIENT
Start: 2024-06-24 | End: 2024-06-28 | Stop reason: HOSPADM

## 2024-06-24 RX ORDER — ROCURONIUM BROMIDE 10 MG/ML
INJECTION, SOLUTION INTRAVENOUS PRN
Status: DISCONTINUED | OUTPATIENT
Start: 2024-06-24 | End: 2024-06-24 | Stop reason: SDUPTHER

## 2024-06-24 RX ORDER — BISACODYL 10 MG
10 SUPPOSITORY, RECTAL RECTAL DAILY PRN
Status: DISCONTINUED | OUTPATIENT
Start: 2024-06-24 | End: 2024-06-28 | Stop reason: HOSPADM

## 2024-06-24 RX ORDER — SODIUM CHLORIDE, SODIUM LACTATE, POTASSIUM CHLORIDE, CALCIUM CHLORIDE 600; 310; 30; 20 MG/100ML; MG/100ML; MG/100ML; MG/100ML
INJECTION, SOLUTION INTRAVENOUS CONTINUOUS PRN
Status: DISCONTINUED | OUTPATIENT
Start: 2024-06-24 | End: 2024-06-24 | Stop reason: SDUPTHER

## 2024-06-24 RX ORDER — CITRIC ACID/SODIUM CITRATE 334-500MG
30 SOLUTION, ORAL ORAL ONCE
Status: DISCONTINUED | OUTPATIENT
Start: 2024-06-24 | End: 2024-06-24

## 2024-06-24 RX ORDER — SODIUM CHLORIDE 0.9 % (FLUSH) 0.9 %
5-40 SYRINGE (ML) INJECTION EVERY 12 HOURS SCHEDULED
Status: DISCONTINUED | OUTPATIENT
Start: 2024-06-24 | End: 2024-06-28 | Stop reason: HOSPADM

## 2024-06-24 RX ORDER — LABETALOL 200 MG/1
800 TABLET, FILM COATED ORAL 3 TIMES DAILY
Status: DISCONTINUED | OUTPATIENT
Start: 2024-06-24 | End: 2024-06-28 | Stop reason: HOSPADM

## 2024-06-24 RX ORDER — SODIUM CHLORIDE 9 MG/ML
INJECTION, SOLUTION INTRAVENOUS PRN
Status: DISCONTINUED | OUTPATIENT
Start: 2024-06-24 | End: 2024-06-24

## 2024-06-24 RX ORDER — SODIUM CHLORIDE 0.9 % (FLUSH) 0.9 %
10 SYRINGE (ML) INJECTION PRN
Status: DISCONTINUED | OUTPATIENT
Start: 2024-06-24 | End: 2024-06-24

## 2024-06-24 RX ORDER — OXYCODONE HYDROCHLORIDE 5 MG/1
5 TABLET ORAL EVERY 6 HOURS PRN
Qty: 20 TABLET | Refills: 0 | Status: SHIPPED | OUTPATIENT
Start: 2024-06-24 | End: 2024-06-29

## 2024-06-24 RX ORDER — ONDANSETRON 2 MG/ML
4 INJECTION INTRAMUSCULAR; INTRAVENOUS EVERY 6 HOURS PRN
Status: DISCONTINUED | OUTPATIENT
Start: 2024-06-24 | End: 2024-06-28 | Stop reason: HOSPADM

## 2024-06-24 RX ORDER — GABAPENTIN 300 MG/1
300 CAPSULE ORAL 3 TIMES DAILY
Status: DISCONTINUED | OUTPATIENT
Start: 2024-06-24 | End: 2024-06-28 | Stop reason: HOSPADM

## 2024-06-24 RX ORDER — MORPHINE SULFATE 1 MG/ML
INJECTION, SOLUTION EPIDURAL; INTRATHECAL; INTRAVENOUS PRN
Status: DISCONTINUED | OUTPATIENT
Start: 2024-06-24 | End: 2024-06-24 | Stop reason: SDUPTHER

## 2024-06-24 RX ORDER — POLYETHYLENE GLYCOL 3350 17 G/17G
17 POWDER, FOR SOLUTION ORAL DAILY PRN
Status: DISCONTINUED | OUTPATIENT
Start: 2024-06-24 | End: 2024-06-28 | Stop reason: HOSPADM

## 2024-06-24 RX ORDER — FENTANYL CITRATE 50 UG/ML
INJECTION, SOLUTION INTRAMUSCULAR; INTRAVENOUS PRN
Status: DISCONTINUED | OUTPATIENT
Start: 2024-06-24 | End: 2024-06-24 | Stop reason: SDUPTHER

## 2024-06-24 RX ADMIN — LABETALOL HYDROCHLORIDE 800 MG: 200 TABLET, FILM COATED ORAL at 16:43

## 2024-06-24 RX ADMIN — LABETALOL HYDROCHLORIDE 800 MG: 200 TABLET, FILM COATED ORAL at 09:59

## 2024-06-24 RX ADMIN — ROCURONIUM BROMIDE 20 MG: 10 INJECTION, SOLUTION INTRAVENOUS at 10:48

## 2024-06-24 RX ADMIN — HYDROMORPHONE HYDROCHLORIDE 0.5 MG: 1 INJECTION, SOLUTION INTRAMUSCULAR; INTRAVENOUS; SUBCUTANEOUS at 11:58

## 2024-06-24 RX ADMIN — DEXAMETHASONE SODIUM PHOSPHATE 10 MG: 10 INJECTION INTRAMUSCULAR; INTRAVENOUS at 10:47

## 2024-06-24 RX ADMIN — HYDROMORPHONE HYDROCHLORIDE 0.25 MG: 1 INJECTION, SOLUTION INTRAMUSCULAR; INTRAVENOUS; SUBCUTANEOUS at 16:43

## 2024-06-24 RX ADMIN — HYDROMORPHONE HYDROCHLORIDE 0.5 MG: 1 INJECTION, SOLUTION INTRAMUSCULAR; INTRAVENOUS; SUBCUTANEOUS at 13:01

## 2024-06-24 RX ADMIN — MAGNESIUM SULFATE HEPTAHYDRATE 2000 MG/HR: 40 INJECTION, SOLUTION INTRAVENOUS at 22:45

## 2024-06-24 RX ADMIN — Medication 909 ML/HR: at 10:42

## 2024-06-24 RX ADMIN — KETOROLAC TROMETHAMINE 30 MG: 30 INJECTION, SOLUTION INTRAMUSCULAR; INTRAVENOUS at 12:13

## 2024-06-24 RX ADMIN — MORPHINE SULFATE 2 MG: 1 INJECTION, SOLUTION EPIDURAL; INTRATHECAL; INTRAVENOUS at 11:34

## 2024-06-24 RX ADMIN — Medication 100 MG: at 10:39

## 2024-06-24 RX ADMIN — SODIUM CHLORIDE, POTASSIUM CHLORIDE, SODIUM LACTATE AND CALCIUM CHLORIDE: 600; 310; 30; 20 INJECTION, SOLUTION INTRAVENOUS at 10:19

## 2024-06-24 RX ADMIN — CEPHALEXIN 500 MG: 500 CAPSULE ORAL at 20:00

## 2024-06-24 RX ADMIN — SODIUM CHLORIDE, POTASSIUM CHLORIDE, SODIUM LACTATE AND CALCIUM CHLORIDE: 600; 310; 30; 20 INJECTION, SOLUTION INTRAVENOUS at 22:48

## 2024-06-24 RX ADMIN — MIDAZOLAM 2 MG: 1 INJECTION INTRAMUSCULAR; INTRAVENOUS at 10:43

## 2024-06-24 RX ADMIN — MORPHINE SULFATE 3 MG: 1 INJECTION, SOLUTION EPIDURAL; INTRATHECAL; INTRAVENOUS at 11:16

## 2024-06-24 RX ADMIN — PROCHLORPERAZINE EDISYLATE 10 MG: 5 INJECTION INTRAMUSCULAR; INTRAVENOUS at 12:56

## 2024-06-24 RX ADMIN — HYDROXYZINE HYDROCHLORIDE 25 MG: 25 TABLET, FILM COATED ORAL at 09:59

## 2024-06-24 RX ADMIN — OXYCODONE HYDROCHLORIDE 5 MG: 5 TABLET ORAL at 20:01

## 2024-06-24 RX ADMIN — MAGNESIUM SULFATE HEPTAHYDRATE 4000 MG: 40 INJECTION, SOLUTION INTRAVENOUS at 13:09

## 2024-06-24 RX ADMIN — SENNOSIDES AND DOCUSATE SODIUM 1 TABLET: 50; 8.6 TABLET ORAL at 20:00

## 2024-06-24 RX ADMIN — ONDANSETRON 4 MG: 2 INJECTION INTRAMUSCULAR; INTRAVENOUS at 16:43

## 2024-06-24 RX ADMIN — MAGNESIUM SULFATE HEPTAHYDRATE 2000 MG/HR: 40 INJECTION, SOLUTION INTRAVENOUS at 13:33

## 2024-06-24 RX ADMIN — HYDROXYZINE HYDROCHLORIDE 25 MG: 25 TABLET, FILM COATED ORAL at 20:00

## 2024-06-24 RX ADMIN — SODIUM CHLORIDE, POTASSIUM CHLORIDE, SODIUM LACTATE AND CALCIUM CHLORIDE: 600; 310; 30; 20 INJECTION, SOLUTION INTRAVENOUS at 13:09

## 2024-06-24 RX ADMIN — Medication 2000 MG: at 10:06

## 2024-06-24 RX ADMIN — ACETAMINOPHEN 1000 MG: 500 TABLET ORAL at 22:51

## 2024-06-24 RX ADMIN — LIDOCAINE HYDROCHLORIDE 50 MG: 10 INJECTION, SOLUTION EPIDURAL; INFILTRATION; INTRACAUDAL; PERINEURAL at 10:38

## 2024-06-24 RX ADMIN — KETOROLAC TROMETHAMINE 30 MG: 30 INJECTION, SOLUTION INTRAMUSCULAR; INTRAVENOUS at 18:35

## 2024-06-24 RX ADMIN — ONDANSETRON 4 MG: 2 INJECTION INTRAMUSCULAR; INTRAVENOUS at 10:35

## 2024-06-24 RX ADMIN — TRANEXAMIC ACID 1000 MG: 10 INJECTION, SOLUTION INTRAVENOUS at 10:31

## 2024-06-24 RX ADMIN — CYCLOBENZAPRINE 10 MG: 10 TABLET, FILM COATED ORAL at 16:43

## 2024-06-24 RX ADMIN — FENTANYL CITRATE 50 MCG: 50 INJECTION, SOLUTION INTRAMUSCULAR; INTRAVENOUS at 10:55

## 2024-06-24 RX ADMIN — PROPOFOL 200 MG: 10 INJECTION, EMULSION INTRAVENOUS at 10:39

## 2024-06-24 RX ADMIN — SODIUM CHLORIDE, POTASSIUM CHLORIDE, SODIUM LACTATE AND CALCIUM CHLORIDE 1000 ML: 600; 310; 30; 20 INJECTION, SOLUTION INTRAVENOUS at 09:42

## 2024-06-24 RX ADMIN — SODIUM CHLORIDE, POTASSIUM CHLORIDE, SODIUM LACTATE AND CALCIUM CHLORIDE: 600; 310; 30; 20 INJECTION, SOLUTION INTRAVENOUS at 10:39

## 2024-06-24 RX ADMIN — SUGAMMADEX 200 MG: 100 INJECTION, SOLUTION INTRAVENOUS at 11:26

## 2024-06-24 RX ADMIN — FENTANYL CITRATE 50 MCG: 50 INJECTION, SOLUTION INTRAMUSCULAR; INTRAVENOUS at 10:43

## 2024-06-24 RX ADMIN — FAMOTIDINE 20 MG: 10 INJECTION, SOLUTION INTRAVENOUS at 09:59

## 2024-06-24 RX ADMIN — ACETAMINOPHEN 1000 MG: 500 TABLET ORAL at 16:43

## 2024-06-24 RX ADMIN — HYDRALAZINE HYDROCHLORIDE 10 MG: 20 INJECTION, SOLUTION INTRAMUSCULAR; INTRAVENOUS at 12:56

## 2024-06-24 RX ADMIN — ACETAMINOPHEN 1000 MG: 500 TABLET ORAL at 09:59

## 2024-06-24 RX ADMIN — GABAPENTIN 300 MG: 300 CAPSULE ORAL at 20:01

## 2024-06-24 RX ADMIN — METRONIDAZOLE 500 MG: 500 TABLET ORAL at 20:00

## 2024-06-24 RX ADMIN — LABETALOL HYDROCHLORIDE 800 MG: 200 TABLET, FILM COATED ORAL at 20:00

## 2024-06-24 RX ADMIN — SODIUM CHLORIDE, POTASSIUM CHLORIDE, SODIUM LACTATE AND CALCIUM CHLORIDE: 600; 310; 30; 20 INJECTION, SOLUTION INTRAVENOUS at 11:25

## 2024-06-24 RX ADMIN — MORPHINE SULFATE 2 MG: 1 INJECTION, SOLUTION EPIDURAL; INTRATHECAL; INTRAVENOUS at 11:10

## 2024-06-24 ASSESSMENT — PAIN DESCRIPTION - LOCATION
LOCATION: ABDOMEN
LOCATION: INCISION

## 2024-06-24 ASSESSMENT — PAIN - FUNCTIONAL ASSESSMENT
PAIN_FUNCTIONAL_ASSESSMENT: ACTIVITIES ARE NOT PREVENTED

## 2024-06-24 ASSESSMENT — PAIN DESCRIPTION - DESCRIPTORS
DESCRIPTORS: CRAMPING;DISCOMFORT
DESCRIPTORS: CRAMPING;BURNING;ACHING
DESCRIPTORS: CRAMPING;DISCOMFORT

## 2024-06-24 ASSESSMENT — PAIN SCALES - GENERAL
PAINLEVEL_OUTOF10: 3
PAINLEVEL_OUTOF10: 5
PAINLEVEL_OUTOF10: 10
PAINLEVEL_OUTOF10: 5
PAINLEVEL_OUTOF10: 2
PAINLEVEL_OUTOF10: 2
PAINLEVEL_OUTOF10: 7

## 2024-06-24 ASSESSMENT — PAIN DESCRIPTION - ORIENTATION
ORIENTATION: LOWER;MID
ORIENTATION: LOWER

## 2024-06-24 ASSESSMENT — PAIN DESCRIPTION - PAIN TYPE: TYPE: SURGICAL PAIN

## 2024-06-24 NOTE — DISCHARGE SUMMARY
surgery: No  Received pre-operative Tylenol and Pepcid: Yes  Received Scopolamine patch: Yes  Received post-operative scheduled Motrin and Tylenol: No hx gastric sleeve, no NSAIDs  Ponce catheter discontinued 12 hours post-operatively: Yes        ERAS requirements met (3/6): Yes    Postpartum course:   POD#0: Patient met criteria for cHTN w/ MICHAEL w/ SF based on LFTs on admission. She received mag sulfate x24hrs. Patient continued on Procardia XL 120mg QD and Labetalol 800mg TID. She required IV anti-hypertensives  POD#1: Hgb 9.8. Rx for PO iron prescribed upon discharge. Magnesium sulfate discontinued after 24 hours. Patient's blood pressures continued to be elevated, hydralazine 10 mg PO QID initiated.  POD#2-4: BP well controlled.     Course of patient: complicated by cHTN w/ MICHAEL w/ SF    Discharge to: Home    Readmission planned: no     Indication for 6 week PP 2 hour GTT?: no     Eligible for 2 week PP virtual visit? no - C/S      Recommendations on Discharge:     Medications:      Medication List        START taking these medications      acetaminophen 500 MG tablet  Commonly known as: TYLENOL  Take 1 tablet by mouth 4 times daily as needed for Pain     gabapentin 300 MG capsule  Commonly known as: NEURONTIN  Take 1 capsule by mouth 3 times daily as needed (post op pain) for up to 7 days. Intended supply: 90 days     hydrALAZINE 10 MG tablet  Commonly known as: APRESOLINE  Take 1 tablet by mouth 4 times daily     oxyCODONE 5 MG immediate release tablet  Commonly known as: Roxicodone  Take 1 tablet by mouth every 6 hours as needed for Pain for up to 5 days. Intended supply: 5 days. Take lowest dose possible to manage pain Max Daily Amount: 20 mg     senna-docusate 8.6-50 MG per tablet  Commonly known as: Senokot S  Take 1 tablet by mouth daily            CHANGE how you take these medications      labetalol 200 MG tablet  Commonly known as: NORMODYNE  Take 4 tablets by mouth 3 times daily  What changed: when to  8.6-50 MG per tablet           Activity: pelvic rest x 6 weeks, no driving on narcotics, no lifting greater than 15 lbs  Diet: regular diet  Follow up:  3 days  for BP check    Condition on discharge: stable    Discharge date: 6/28/24    Brenda Dwyer DO  Ob/Gyn Resident    Comments:  Home care and follow-up care were reviewed.  Pelvic rest, and birth control were reviewed. Signs and symptoms of mastitis and post partum depression were reviewed. The patient is to notify her physician if any of these occur. The patient was counseled on secondary smoke risks and the increased risk of sudden infant death syndrome and respiratory problems to her baby with exposure. She was counseled on various alternate recommendations to decrease the exposure to secondary smoke to her children.

## 2024-06-24 NOTE — OP NOTE
Operative Note      Patient: Siddharth Fermin  YOB: 1995  MRN: [id number]    Date of Procedure: 2024    Preoperative Diagnosis:  Chronic Cholecystitis    Post-Op Diagnosis: Same       Procedure(s):   SECTION    Surgeon:  Roverto    Assistant:   Resident: Brenda Dwyer DO; Martha Bourgeois DO    Anesthesia: Spinal    Estimated Blood Loss (mL): Minimal    Complications: None    Specimens:   * No specimens in log *    Implants:  * No implants in log *      Drains:   Urinary Catheter 24 (Active)       Findings:  No Acute Cholecystitis    Detailed Description of Procedure:     C section completed by OB.  Asked for evaluation of gallbladder.  I scrubbed.  The gallbladder non distended, soft, without signs of inflammation.  No evidence of current acute cholecystitis.      Case turned back over to OB for abdominal closure.  Counts correct for my limited portion.    Patient can follow up outpatient for gallstones and gallbladder as original plan.      Electronically signed by Nathan Layne DO on 2024 at 11:04 AM

## 2024-06-24 NOTE — PROGRESS NOTES
Resident Interval Magnesium Sulfate Note    Siddharth Fermin is a 28 y.o. female  POD# 0 s/p LTCS  The patient is resting comfortably. She denies headache, visual changes, abdominal pain, nausea, vomiting, backache, and dysuria. She denies any shortness of breath or chest pain. She denies change in her extremities, regarding swelling.    Continuous Medications:    oxytocin Stopped (24 1405)    lactated ringers IV soln Stopped (24 1308)    sodium chloride      lactated ringers IV soln 75 mL/hr at 24 1309    sodium chloride      magnesium sulfate 2,000 mg/hr (24 1333)       Vitals:    /77   Pulse 76   Temp 97.6 °F (36.4 °C) (Oral)   Resp 18   LMP 2023 (Approximate)   SpO2 95%   Breastfeeding Unknown       Physical Exam:  Chest: clear to auscultation bilaterally  Heart: RRR no murmur  Abdomen: soft, nontender, nondistended  Extremities: DTR normal Bilateral lower extremities Right: 2/4   Left: 2/4  Clonus: absent    Urine Output: No UOP documented    Labs:  Last Magnesium Level:   Lab Results   Component Value Date/Time    MG 1.9 2024 01:19 AM       BMP:    Recent Labs     24  0339 24  0937    139   K 4.0 4.1   * 109*   CO2 17* 18*   BUN 16 19   CREATININE 0.6 0.8   GLUCOSE 68* 107*       ASSESSMENT/PLAN  Siddharth Fermin is a 28 y.o. female  POD# 0 s/p LTCS   - Continue Magnesium Sulfate Treatment 2g/hr, off @ 1309    - BPs normotensive   - Patient denies any s/s PreE   - UOP not yet documented   - PreE labs with elevated LFTs, borderline thrombocytopenia, P/C 18.85   - Currently controlled on Labetalol 800 mg TID, Procardia  mg qday   - Last IV anti-hypertensive Hydralazine 10 mg @ 1256 2024   - Continue to monitor closely    Pardeep Rubio MD  Ob/Gyn Resident  2024, 5:18 PM

## 2024-06-24 NOTE — PROGRESS NOTES
Obstetric/Gynecology Resident Interval Note    UDS positive for amphetamines, iatrogenic for prescribed Labetalol. No clinical concerns.     Martha Bourgeois DO  OB/GYN Resident, PGY1  [place], [place]  6/24/2024, 11:55 AM

## 2024-06-24 NOTE — PROGRESS NOTES
Obstetric/Gynecology Resident Interval Note    Labs from admission reviewed. ALT/AST 97/91, twice the upper limit of normal. Patient has previous diagnosis of chronic hypertension on medication with super-imposed pre-eclampsia without severe features. She now meets criteria for cHTN w/ MICHAEL w/ SF based on LFTs. Magnesium sulfate infusion ordered and will continue for 24 hours.   Patient currently on Labetalol 800mg TID and Procardia XL 120mg QD. Will continue to monitor BP and patient status closely.    Dr Littlejohn updated and in agreement.    Vitals:    06/24/24 1530 06/24/24 1600 06/24/24 1630 06/24/24 1730   BP: 128/66 131/69 136/77 132/79   Pulse: 52 56 76 65   Resp: 18 18 18 16   Temp:       TempSrc:       SpO2: 94% 93% 95% 94%       Recent Results (from the past 12 hour(s))   TYPE AND SCREEN    Collection Time: 06/24/24  9:37 AM   Result Value Ref Range    Blood Bank Sample Expiration 06/27/2024,2359     Arm Band Number [id number]     ABO/Rh B NEGATIVE     Antibody Screen NEGATIVE    CBC    Collection Time: 06/24/24  9:37 AM   Result Value Ref Range    WBC 9.7 3.5 - 11.3 k/uL    RBC 3.73 (L) 3.95 - 5.11 m/uL    Hemoglobin 10.8 (L) 11.9 - 15.1 g/dL    Hematocrit 32.0 (L) 36.3 - 47.1 %    MCV 85.8 82.6 - 102.9 fL    MCH 29.0 25.2 - 33.5 pg    MCHC 33.8 28.4 - 34.8 g/dL    RDW 13.7 11.8 - 14.4 %    Platelets 135 (L) 138 - 453 k/uL    MPV 10.6 8.1 - 13.5 fL    NRBC Automated 0.0 0.0 per 100 WBC   T. pallidum Ab    Collection Time: 06/24/24  9:37 AM   Result Value Ref Range    T. pallidum, IgG NONREACTIVE NONREACTIVE   Comprehensive Metabolic Panel    Collection Time: 06/24/24  9:37 AM   Result Value Ref Range    Sodium 139 136 - 145 mmol/L    Potassium 4.1 3.7 - 5.3 mmol/L    Chloride 109 (H) 98 - 107 mmol/L    CO2 18 (L) 20 - 31 mmol/L    Anion Gap 12 9 - 16 mmol/L    Glucose 107 (H) 74 - 99 mg/dL    BUN 19 6 - 20 mg/dL    Creatinine 0.8 0.50 - 0.90 mg/dL    Est, Glom Filt Rate >90 >60 mL/min/1.73m2    Calcium 8.2

## 2024-06-24 NOTE — CARE COORDINATION
CASE MANAGEMENT POST-PARTUM TRANSITIONAL CARE PLAN    33 weeks gestation of pregnancy [Z3A.33]    Writer attempted to meet with patient to do DCP. Patient was resting and asked writer to come back tomorrow.

## 2024-06-24 NOTE — ANESTHESIA PRE PROCEDURE
Department of Anesthesiology  Preprocedure Note       Name:  Siddharth Fermin   Age:  28 y.o.  :  1995                                          MRN:  [id number]         Date:  2024      Surgeon: Surgeon(s):  Marge Lechuga MD    Procedure: Procedure(s):   SECTION    Medications prior to admission:   Prior to Admission medications    Medication Sig Start Date End Date Taking? Authorizing Provider   labetalol (NORMODYNE) 200 MG tablet Take 4 tablets by mouth in the morning, at noon, and at bedtime 24  Tae Cruz MD   ondansetron (ZOFRAN-ODT) 4 MG disintegrating tablet Place 1 tablet under the tongue every 8 hours as needed for Nausea or Vomiting  Patient not taking: Reported on 2024  Tae Cruz MD   cyclobenzaprine (FLEXERIL) 10 MG tablet Take 1 tablet by mouth 3 times daily as needed for Muscle spasms 6/23/24 7/3/24  Tae Cruz MD   lidocaine 4 % external patch Place 1 patch onto the skin daily for 3 days  Patient not taking: Reported on 2024  Tae Cruz MD   NIFEdipine (NIFEDICAL XL) 60 MG extended release tablet Take 2 tablets by mouth daily 24   Brenda Dwyer DO   metoclopramide (REGLAN) 10 MG tablet Take 1 tablet by mouth nightly as needed (for headaches) 24   Vidhi Arce, APRN - CNP   Magnesium Oxide -Mg Supplement 400 MG CAPS Take 1 capsule by mouth at bedtime 24   Lex Torres MD   famotidine (PEPCID) 40 MG tablet Take 1 tablet by mouth every evening  Patient not taking: Reported on 2024   Marge Lechuga MD   aspirin (ASPIRIN CHILDRENS) 81 MG chewable tablet Take 1 tablet by mouth daily 24  Marge Lechuga MD   Prenatal Vit-Fe Fumarate-FA (PRENATAL PO) Take by mouth    ProviderMari MD       Current medications:    Current Facility-Administered Medications   Medication Dose Route Frequency Provider Last Rate Last Admin    labetalol

## 2024-06-24 NOTE — FLOWSHEET NOTE
Patient and patients family state they have to see baby or the mag cannot be started. Writer informed the residents. Writer will accompany patient to the NICU once recovery is up. Writer told her it has to be very quick then we have to come right back up to her room then she cannot see baby again for about 24 hrs. Writer will continue to monitor.    None

## 2024-06-24 NOTE — PROGRESS NOTES
Obstetric/Gynecology Resident Interval Note    Notified of severe range BP x2, 15 minutes apart. Will order Hydralazine 10 mg IV x1. Continue to monitor.     Martha Bourgeois DO  OB/GYN Resident, PGY1  [place], [place]  6/24/2024, 12:45 PM

## 2024-06-24 NOTE — ANESTHESIA POSTPROCEDURE EVALUATION
Department of Anesthesiology  Postprocedure Note    Patient: Siddharth Fermin  MRN: [id number]  YOB: 1995  Date of evaluation: 2024    Procedure Summary       Date: 24 Room / Location: [facility] OR [address] [place] [address]    Anesthesia Start: 1020 Anesthesia Stop: 1138    Procedure:  SECTION (Abdomen) Diagnosis:       Elective surgical procedure      (Elective surgical procedure [Z41.9])    Surgeons: Marge Lechuga MD Responsible Provider: Mark Lassiter MD    Anesthesia Type: general ASA Status: 3            Anesthesia Type: No value filed.    Nagi Phase I:      Nagi Phase II:      Anesthesia Post Evaluation    Patient location during evaluation: PACU  Patient participation: complete - patient participated  Level of consciousness: awake and alert  Airway patency: patent  Nausea & Vomiting: no nausea and no vomiting  Cardiovascular status: blood pressure returned to baseline  Respiratory status: acceptable  Hydration status: euvolemic  Pain management: adequate    No notable events documented.

## 2024-06-24 NOTE — FLOWSHEET NOTE
Writer notified Dr Martha PENG of patients pain and she verified that patient is ok to take toradol with her history of the gastric sleeve. Writer will continue to monitor.

## 2024-06-24 NOTE — PROGRESS NOTES
I would advise delivery today at 33 6/7 weeks' gestation for fetal growth restriction (with oligohydramnios, chronic hypertension with superimposed preeclampsia without severe features currently, biophysical profile score of 2/8, etc.) and given the maternal/fetal medical/obstetrical complications of pregnancy), as per The American College of Obstetricians and Gynecologists and the Society for Maternal-Fetal Medicine guidelines in the ACOG Committee Opinion Number 831  (\"Medically Indicated Late- and Early-Term Deliveries\", Obstetrics & Gynecology, Volume 138, NO.1, 2021).     Ultrasound findings reviewed with the patient today. She is amenable to the above plan of care for delivery as well and all questions have been answered.    Covering obstetrics attending provider (Dr. Barbie Littlejohn) notified of the ultrasound findings and plan of further care appropriately coordinated.    Please refer to Maternal-Fetal Medicine OBGYN resident progress note in EPIC.

## 2024-06-24 NOTE — H&P
OBSTETRICAL HISTORY AND PHYSICAL  [facility]    Date: 2024       Time: 9:36 AM   Patient Name: Siddharth Fermin     Patient : 1995  Room/Bed: 0703/0703-01    Admission Date/Time: 2024  8:51 AM      CC:  [facility] rec del 2/2 2/8 BPP    HPI: Siddharth Fermin is a 28 y.o.  at 33w6d who presents to L&D for delivery after being seen at [facility] and having a 2/8 BPP (off for movement, tone, fluid).  She has chronic hypertension with SIP without severe features managed on Labetalol 800 TID and procardia 120 XL qd.    Of note, patient was recently discharged yesterday AM after being admitted for extended fetal monitoring and BP management. Patient has known acute cholecystitis with plans to complete  section and cholecystectomy at the same time tomorrow 24.    The patient reports fetal movement is present, denies contractions, denies loss of fluid, denies vaginal bleeding.  She denies HA, vision changes, SOB, chest pain, lightheadedness, dizziness, nausea, vomiting, dysuria, and hematuria.     DATING:  LMP: Patient's last menstrual period was 2023 (approximate).  Estimated Date of Delivery: 24   Based on: early ultrasound, at 9 3/7 weeks GA    PREGNANCY RISK FACTORS:  Patient Active Problem List   Diagnosis    PCO (polycystic ovaries)    Generalized anxiety disorder    Lumbar radiculopathy    Migraine with aura    S/P laparoscopic sleeve gastrectomy    Morbid obesity (HCC)    Brain concussion    Closed fracture of pelvis (HCC)    Depression    Dysmenorrhea    Moderate episode of recurrent major depressive disorder (HCC)    Rh negative state in antepartum period    cHTN (meds) w/ MICHAEL w/o SF    Desires primary CS    FGR (AC<10th%)    33 weeks gestation of pregnancy    High-risk pregnancy in third trimester    Chronic cholecystitis with calculus        Steroids Given In This Pregnancy:  yes     REVIEW OF SYSTEMS:   Constitutional: negative fever,

## 2024-06-24 NOTE — BRIEF OP NOTE
Department of Obstetrics and Gynecology  Obstetrical Brief Operative Report  [facility]    Patient: Siddharth Roqueczypmissy   : 1995  MRN: [id number]       Acct: [id number]   Date of Procedure: 24    Pre-operative Diagnosis: 28 y.o. female  at 33w6d   [facility] recommended delivery secondary to 2/8 BPP (off for movement, tone, fluid)  Oligohydramnios (MAYTE 1.76, no 2x2 pocket)  Cesaraean section secondary to maternal request  Acute Cholecystitis  History of pelvic fracture  Chronic hypertension with superimposed preeclampsia without severe features  FGR (AC<10th%ile)  Maternal short stature  Migraine without Aura  History of gastric sleeve ()  PCOS  GERD  Anxiety/Depression  BMI 43     Post-operative Diagnosis:  living infant   [facility] recommended delivery secondary to 2/8 BPP (off for movement, tone, fluid)  Oligohydramnios (MAYTE 1.76, no 2x2 pocket)  Cesaraean section secondary to maternal request  Chronic Cholecystitis  History of pelvic fracture  Chronic hypertension with superimposed preeclampsia without severe features  FGR (AC<10th%ile)  Maternal short stature  Migraine without Aura  History of gastric sleeve ()  PCOS  GERD  Anxiety/Depression  BMI 43     Procedure: primary low transverse  section with survey of gallbladder    Surgeon: Dr. Alexandrea MD, Dr. Roverto DO  Assistant(s): Brenda Dwyer DO, PGY3, Martha Bourgeois DO, PGY1    Anesthesia: General via ET tube    Information for the patient's :  Zander, Fredy Messina [[id number]]   female   Birth Weight: N/A   Information for the patient's :  Fredy Fermin [[id number]]        Findings:  Live Born weight pending,  female infant in cephalic presentation with Apgars of 8 at 1 minute and 9 at five minutes, normal appearing uterus tubes and ovaries   Estimated Blood Loss: Pending immediately post-operatively  Total IV Fluids: 1800 mL  Urine output: 100 cc clear urine   Drains:

## 2024-06-24 NOTE — PROGRESS NOTES
Obstetric/Gynecology Maternal Fetal Medicine Resident Note    Patient is informed of finding of 2/8 seen on ultrasound today. Patient is amenable to presenting to L&D for delivery. Dr. Littlejohn is updated.      Lex Torres MD  OBGYN Resident, PGY1  [facility]  6/24/2024, 8:35 AM

## 2024-06-25 PROBLEM — O09.93 HIGH-RISK PREGNANCY IN THIRD TRIMESTER: Status: RESOLVED | Noted: 2024-06-19 | Resolved: 2024-06-25

## 2024-06-25 PROBLEM — Z3A.33 33 WEEKS GESTATION OF PREGNANCY: Status: RESOLVED | Noted: 2024-06-18 | Resolved: 2024-06-25

## 2024-06-25 LAB
ERYTHROCYTE [DISTWIDTH] IN BLOOD BY AUTOMATED COUNT: 14.1 % (ref 11.8–14.4)
HCT VFR BLD AUTO: 31 % (ref 36.3–47.1)
HGB BLD-MCNC: 9.8 G/DL (ref 11.9–15.1)
MCH RBC QN AUTO: 28.7 PG (ref 25.2–33.5)
MCHC RBC AUTO-ENTMCNC: 31.6 G/DL (ref 28.4–34.8)
MCV RBC AUTO: 90.6 FL (ref 82.6–102.9)
NRBC BLD-RTO: 0 PER 100 WBC
PLATELET # BLD AUTO: 147 K/UL (ref 138–453)
PMV BLD AUTO: 11 FL (ref 8.1–13.5)
RBC # BLD AUTO: 3.42 M/UL (ref 3.95–5.11)
WBC OTHER # BLD: 12 K/UL (ref 3.5–11.3)

## 2024-06-25 PROCEDURE — 2580000003 HC RX 258

## 2024-06-25 PROCEDURE — 6370000000 HC RX 637 (ALT 250 FOR IP)

## 2024-06-25 PROCEDURE — 6360000002 HC RX W HCPCS

## 2024-06-25 PROCEDURE — 85027 COMPLETE CBC AUTOMATED: CPT

## 2024-06-25 PROCEDURE — 1220000000 HC SEMI PRIVATE OB R&B

## 2024-06-25 PROCEDURE — 36415 COLL VENOUS BLD VENIPUNCTURE: CPT

## 2024-06-25 RX ORDER — HYDRALAZINE HYDROCHLORIDE 10 MG/1
10 TABLET, FILM COATED ORAL 4 TIMES DAILY
Status: DISCONTINUED | OUTPATIENT
Start: 2024-06-25 | End: 2024-06-28 | Stop reason: HOSPADM

## 2024-06-25 RX ADMIN — OXYCODONE HYDROCHLORIDE 5 MG: 5 TABLET ORAL at 04:51

## 2024-06-25 RX ADMIN — SIMETHICONE 80 MG: 80 TABLET, CHEWABLE ORAL at 08:39

## 2024-06-25 RX ADMIN — METRONIDAZOLE 500 MG: 500 TABLET ORAL at 05:46

## 2024-06-25 RX ADMIN — OXYCODONE HYDROCHLORIDE 5 MG: 5 TABLET ORAL at 00:53

## 2024-06-25 RX ADMIN — HYDRALAZINE HYDROCHLORIDE 10 MG: 10 TABLET, FILM COATED ORAL at 18:37

## 2024-06-25 RX ADMIN — HYDRALAZINE HYDROCHLORIDE 10 MG: 10 TABLET, FILM COATED ORAL at 13:58

## 2024-06-25 RX ADMIN — NIFEDIPINE 120 MG: 30 TABLET, FILM COATED, EXTENDED RELEASE ORAL at 08:39

## 2024-06-25 RX ADMIN — ACETAMINOPHEN 1000 MG: 500 TABLET ORAL at 18:37

## 2024-06-25 RX ADMIN — LABETALOL HYDROCHLORIDE 800 MG: 200 TABLET, FILM COATED ORAL at 21:22

## 2024-06-25 RX ADMIN — METRONIDAZOLE 500 MG: 500 TABLET ORAL at 21:22

## 2024-06-25 RX ADMIN — GABAPENTIN 300 MG: 300 CAPSULE ORAL at 21:22

## 2024-06-25 RX ADMIN — SENNOSIDES AND DOCUSATE SODIUM 1 TABLET: 50; 8.6 TABLET ORAL at 08:40

## 2024-06-25 RX ADMIN — ACETAMINOPHEN 1000 MG: 500 TABLET ORAL at 11:04

## 2024-06-25 RX ADMIN — Medication 1 TABLET: at 08:39

## 2024-06-25 RX ADMIN — CEPHALEXIN 500 MG: 500 CAPSULE ORAL at 13:58

## 2024-06-25 RX ADMIN — MAGNESIUM SULFATE HEPTAHYDRATE 2000 MG/HR: 40 INJECTION, SOLUTION INTRAVENOUS at 07:59

## 2024-06-25 RX ADMIN — CYCLOBENZAPRINE 10 MG: 10 TABLET, FILM COATED ORAL at 08:40

## 2024-06-25 RX ADMIN — LABETALOL HYDROCHLORIDE 800 MG: 200 TABLET, FILM COATED ORAL at 08:39

## 2024-06-25 RX ADMIN — HYDRALAZINE HYDROCHLORIDE 10 MG: 10 TABLET, FILM COATED ORAL at 21:22

## 2024-06-25 RX ADMIN — CEPHALEXIN 500 MG: 500 CAPSULE ORAL at 05:46

## 2024-06-25 RX ADMIN — CEPHALEXIN 500 MG: 500 CAPSULE ORAL at 21:22

## 2024-06-25 RX ADMIN — SODIUM CHLORIDE, PRESERVATIVE FREE 10 ML: 5 INJECTION INTRAVENOUS at 21:22

## 2024-06-25 RX ADMIN — KETOROLAC TROMETHAMINE 30 MG: 30 INJECTION, SOLUTION INTRAMUSCULAR; INTRAVENOUS at 00:52

## 2024-06-25 RX ADMIN — LABETALOL HYDROCHLORIDE 800 MG: 200 TABLET, FILM COATED ORAL at 13:58

## 2024-06-25 RX ADMIN — METRONIDAZOLE 500 MG: 500 TABLET ORAL at 13:58

## 2024-06-25 RX ADMIN — ACETAMINOPHEN 1000 MG: 500 TABLET ORAL at 04:50

## 2024-06-25 RX ADMIN — GABAPENTIN 300 MG: 300 CAPSULE ORAL at 08:39

## 2024-06-25 ASSESSMENT — PAIN SCALES - GENERAL
PAINLEVEL_OUTOF10: 4
PAINLEVEL_OUTOF10: 3
PAINLEVEL_OUTOF10: 4
PAINLEVEL_OUTOF10: 4
PAINLEVEL_OUTOF10: 5
PAINLEVEL_OUTOF10: 3
PAINLEVEL_OUTOF10: 3

## 2024-06-25 ASSESSMENT — PAIN DESCRIPTION - ORIENTATION
ORIENTATION: LOWER
ORIENTATION: LOWER;MID
ORIENTATION: LOWER;MID
ORIENTATION: LOWER
ORIENTATION: LOWER

## 2024-06-25 ASSESSMENT — PAIN DESCRIPTION - DESCRIPTORS
DESCRIPTORS: CRAMPING;DISCOMFORT
DESCRIPTORS: CRAMPING;DISCOMFORT
DESCRIPTORS: BURNING;CRAMPING
DESCRIPTORS: CRAMPING;DISCOMFORT
DESCRIPTORS: DISCOMFORT;CRAMPING;ACHING

## 2024-06-25 ASSESSMENT — PAIN DESCRIPTION - LOCATION
LOCATION: ABDOMEN
LOCATION: INCISION
LOCATION: INCISION
LOCATION: ABDOMEN
LOCATION: INCISION

## 2024-06-25 ASSESSMENT — PAIN - FUNCTIONAL ASSESSMENT
PAIN_FUNCTIONAL_ASSESSMENT: ACTIVITIES ARE NOT PREVENTED

## 2024-06-25 NOTE — PROGRESS NOTES
Resident Interval Magnesium Sulfate Note    Siddharth Fermin is a 28 y.o. female  POD# 0 s/p LTCS  The patient is resting comfortably. She denies headache, visual changes, abdominal pain, nausea, vomiting, backache, and dysuria. She denies any shortness of breath or chest pain. She denies change in her extremities, regarding swelling.    Continuous Medications:    oxytocin Stopped (24 1405)    sodium chloride      sodium chloride      magnesium sulfate 2,000 mg/hr (24 0454)    lactated ringers IV soln 75 mL/hr at 24 0454       Vitals:    BP (!) 140/90   Pulse 57   Temp 97.9 °F (36.6 °C) (Oral)   Resp 16   LMP 2023 (Approximate)   SpO2 93%   Breastfeeding Unknown       Physical Exam:  Chest: clear to auscultation bilaterally  Heart: RRR no murmur  Abdomen: soft, nontender, nondistended  Extremities: DTR normal Bilateral lower extremities Right: 2/4   Left: 2/4  Clonus: absent    Urine Output: 75 mL/hr    Labs:  Last Magnesium Level:   Lab Results   Component Value Date/Time    MG 6.0 2024 07:29 PM       BMP:    Recent Labs     24  0937      K 4.1   *   CO2 18*   BUN 19   CREATININE 0.8   GLUCOSE 107*       ASSESSMENT/PLAN  Siddharth Fermin is a 28 y.o. female  POD# 0 s/p LTCS   - Continue Magnesium Sulfate Treatment 2g/hr, off @ 1309    - BPs normotensive/elevated   - Patient denies any s/s PreE   - UOP adequate   - PreE labs with elevated LFTs, borderline thrombocytopenia, P/C 18.85   - Currently controlled on Labetalol 800 mg TID, Procardia  mg qday   - Last IV anti-hypertensive Hydralazine 10 mg @ 1256 2024   - Continue to monitor closely    Pardeep Rubio MD  Ob/Gyn Resident  2024, 4:58 AM

## 2024-06-25 NOTE — CONSULTS
Initiation of Electric Breast Pumping     Pumping Initiated at 1445    Initiated due to    [x]   Baby in NICU   []   Plans exclusive pumping   []   Infant weight loss(supplement)   []   Baby not latching well    Flange Size    Right:   Left:     [x]   24    [x]   24     []   27    []   27     []   30    []   30     []   36    []   36  Instructions   [x]   Verbal instructions on how to setup pump and how to use initiation phase   [x]   Written sheet\" How to keep your breast pump kit clean\"   [x]   Expectation sheet for Breastfeeding mothers with pumping log   [x]   Frequency of pumping   [x]   Collection,labeling and storage of colostrum and milk    Supplies Provided   [x]   Pump initiation kit   [x]   Cleaning supplies (basin and soap)   []   Additional flange size   [x]   Oral syringes/snappies   []   Patient labels    Taught initiation setting on the Symphony breast pump. Moisture to the nipple. Reassurance given.   -

## 2024-06-25 NOTE — PROGRESS NOTES
Obstetric/Gynecology Resident Interval Note    BP reviewed noted to be elevated. Decision made to add Hydralazine 10 mg QID. Discussed with patient who is amenable.     Vitals:    06/26/24 0112 06/26/24 0437 06/26/24 0809 06/26/24 1403   BP: 113/72 117/73 139/73 123/73   Pulse: 62 59 73    Resp: 17 17 16    Temp:   98.1 °F (36.7 °C)    TempSrc:   Oral    SpO2:   98%      Dr. Brown updated    Emma Montanez DO  OB/GYN Resident, PGY2  [place], [place]  6/25/2024, 1:35 PM

## 2024-06-25 NOTE — PLAN OF CARE
Problem: ABCDS Injury Assessment  Goal: Absence of physical injury  Outcome: Progressing     Problem: Pain  Goal: Verbalizes/displays adequate comfort level or baseline comfort level  Outcome: Progressing     Problem: Anxiety  Goal: Will report anxiety at manageable levels  Description: INTERVENTIONS:  1. Administer medication as ordered  2. Teach and rehearse alternative coping skills  3. Provide emotional support with 1:1 interaction with staff  Outcome: Progressing     Problem: Coping  Goal: Pt/Family able to verbalize concerns and demonstrate effective coping strategies  Description: INTERVENTIONS:  1. Assist patient/family to identify coping skills, available support systems and cultural and spiritual values  2. Provide emotional support, including active listening and acknowledgement of concerns of patient and caregivers  3. Reduce environmental stimuli, as able  4. Instruct patient/family in relaxation techniques, as appropriate  5. Assess for spiritual pain/suffering and initiate Spiritual Care, Psychosocial Clinical Specialist consults as needed  Outcome: Progressing     Problem: Respiratory - Adult  Goal: Achieves optimal ventilation and oxygenation  Outcome: Progressing     Problem: Skin/Tissue Integrity - Adult  Goal: Skin integrity remains intact  Outcome: Progressing  Goal: Incisions, wounds, or drain sites healing without S/S of infection  Outcome: Progressing  Goal: Oral mucous membranes remain intact  Outcome: Progressing     Problem: Musculoskeletal - Adult  Goal: Return mobility to safest level of function  Outcome: Progressing  Goal: Maintain proper alignment of affected body part  Outcome: Progressing  Goal: Return ADL status to a safe level of function  Outcome: Progressing     Problem: Gastrointestinal - Adult  Goal: Minimal or absence of nausea and vomiting  Outcome: Progressing  Goal: Maintains or returns to baseline bowel function  Outcome: Progressing  Goal: Maintains adequate

## 2024-06-25 NOTE — PROGRESS NOTES
Resident Interval Magnesium Sulfate and Rounding Note   POST OPERATIVE DAY # 1    Siddharth Fermin is a 28 y.o. female  This patient was seen & examined today.     Her pregnancy was complicated by:   Patient Active Problem List   Diagnosis    Rash of face    Chlamydia 2022    Severe depression (HCC)    First pregnancy, second trimester    Late prenatal care affecting pregnancy in second trimester    Fetal drug exposure to Cymbalta & Celexa    Hypothyroidism    Elv 1 hr, normal 3 hr GTT    suspected fetal ventrical septal defects    cHTN (no meds) > meds    35 weeks gestation of pregnancy    FGR (AC< 10%)    Chronic hypertension affecting pregnancy    Primiparity, third trimester    Anxiety    Celestone 24 & **    36 weeks gestation of pregnancy     24 M Apg  Wt 5#2    Chronic hypertension with superimposed pre-eclampsia    Spontaneous vaginal delivery       Today she is doing well without any chief complaint. Her lochia is light. She denies chest pain, shortness of breath, headache, lightheadedness, blurred vision, and peripheral edema. She is bottle feeding and she denies any breast tenderness. She has ambulated. She was voiding appropriately with a kim which has now been removed. I reviewed signs and symptoms of post partum depression with the patient, she currently denies any of these symptoms. She is tolerating solids.     Vital Signs:  Vitals:    24 2245 24 2345 24 0045 24 0052   BP: 130/77 112/72 127/75    Pulse: 78 60 66    Resp: 18 18 18 18   Temp:  97.9 °F (36.6 °C)     TempSrc:  Oral     SpO2: 96% 95% 96%          Physical Exam:  General:  no apparent distress, alert, and cooperative  Neurologic:  alert, oriented, normal speech, no focal findings or movement disorder noted  Lungs:  No increased work of breathing, good air exchange, clear to auscultation bilaterally, no crackles or wheezing  Heart:  regular rate and rhythm    Abdomen: abdomen soft,  SCREEN MULTI URINE    Collection Time: 24 10:26 AM   Result Value Ref Range    Amphetamine Screen, Ur POSITIVE (A) NEGATIVE    Barbiturate Screen, Ur NEGATIVE NEGATIVE    Benzodiazepine Screen, Urine NEGATIVE NEGATIVE    Cocaine Metabolite, Urine NEGATIVE NEGATIVE    Methadone Screen, Urine NEGATIVE NEGATIVE    Opiates, Urine NEGATIVE NEGATIVE    Phencyclidine, Urine NEGATIVE NEGATIVE    Cannabinoid Scrn, Ur NEGATIVE NEGATIVE    Oxycodone Screen, Ur NEGATIVE NEGATIVE    Fentanyl, Ur NEGATIVE NEGATIVE    Test Information       Assay provides rapid clinical screening only.  Presumptive positive results for legal purposes should be confirmed by another method.  To request confirmation, please call the lab within 7 days of sample submission.   Protein / creatinine ratio, urine    Collection Time: 24 10:26 AM   Result Value Ref Range    Total Protein, Urine 920 mg/dL    Creatinine, Ur 48.8 28.0 - 217.0 mg/dL    Urine Total Protein Creatinine Ratio 18.85 (H) 0.00 - 0.20   CBC    Collection Time: 24  7:29 PM   Result Value Ref Range    WBC 16.6 (H) 3.5 - 11.3 k/uL    RBC 3.44 (L) 3.95 - 5.11 m/uL    Hemoglobin 10.0 (L) 11.9 - 15.1 g/dL    Hematocrit 31.0 (L) 36.3 - 47.1 %    MCV 90.1 82.6 - 102.9 fL    MCH 29.1 25.2 - 33.5 pg    MCHC 32.3 28.4 - 34.8 g/dL    RDW 13.8 11.8 - 14.4 %    Platelets 152 138 - 453 k/uL    MPV 10.8 8.1 - 13.5 fL    NRBC Automated 0.0 0.0 per 100 WBC   Magnesium    Collection Time: 24  7:29 PM   Result Value Ref Range    Magnesium 6.0 (HH) 1.6 - 2.6 mg/dL       Urine Output: 50cc/hr; Clear urine      Assessment/Plan:  Siddharth Fermin is a  POD # 1 s/p PLTCS (gen)    - Doing well, VSS   - Female infant in General Care Nursery   - Encourage ambulation   - S/p Ponce and IVF on POD#1   - CBC pending this am    - Toradol x 3 doses; Tylenol/Yesi/Gabapentin and Flexeril PRN for pain control    - Prevena dressing in place and functioning     Rh negative/Rubella

## 2024-06-25 NOTE — CARE COORDINATION
ANTEPARTUM NOTE    33 weeks gestation of pregnancy [Z3A.33]    Marilu was admitted to L&D on 6/24/2024 for MFM recommended delivery @ 33 6/7.     OB GYN Provider: Dr Lechuga    Will meet with patient after delivery to verify name/address/phone/insurance and discuss discharge planning.     Anticipate DC home 2 nights after vaginal delivery or 4 nights after C/S delivery as long as hemodynamically stable.

## 2024-06-25 NOTE — CARE COORDINATION
CASE MANAGEMENT POST-PARTUM TRANSITIONAL CARE PLAN    33 weeks gestation of pregnancy [Z3A.33]    OB Provider: Dr Lechuga    Writer met w/ Siddharth at her bedside to discuss DCP. She is S/P CS (general) on 2024    Writer verified address/phone number correct on facesheet. She states she lives alone. She denied barriers with transportation home, to doctors appointments or with paying for medications upon discharge home.     BCBS insurance correct with [facility]. Writer notified Siddharth she has 30 days from date of birth to add  to insurance policy. Siddharth verbalized understanding. She would like to see if baby would qualify for medicaid.  She is currently trying to get EBT and has WIC.  Call to Theron in HELP program as he is a medicaid specialist.  He will see Siddharth today.     Infant name on BC: Maryellen Fermin.   Infant PCP Dr Dotson.     DME: has BP cuff  HOME CARE: no    Anticipated DC of mother and infant 2-4 days after CS delivery.    Readmission Risk              Risk of Unplanned Readmission:  17

## 2024-06-25 NOTE — PROGRESS NOTES
Resident Interval Magnesium Sulfate Note    Siddharth Fermin is a 28 y.o. female  POD# 0 s/p LTCS  The patient is resting comfortably. She denies headache, visual changes, abdominal pain, nausea, vomiting, backache, and dysuria. She denies any shortness of breath or chest pain. She denies change in her extremities, regarding swelling.    Continuous Medications:    oxytocin Stopped (24 1405)    sodium chloride      lactated ringers IV soln 75 mL/hr at 24 1950    sodium chloride      magnesium sulfate 2,000 mg/hr (24 1333)       Vitals:    BP (!) (P) 143/75   Pulse 65   Temp 97.8 °F (36.6 °C) (Oral)   Resp 18   LMP 2023 (Approximate)   SpO2 95%   Breastfeeding Unknown       Physical Exam:  Chest: clear to auscultation bilaterally  Heart: RRR no murmur  Abdomen: soft, nontender, nondistended  Extremities: DTR normal Bilateral lower extremities Right: 2/4   Left: 2/4  Clonus: absent    Urine Output: 187.5 mL/hr    Labs:  Last Magnesium Level:   Lab Results   Component Value Date/Time    MG 6.0 2024 07:29 PM       BMP:    Recent Labs     24  0339 24  0937    139   K 4.0 4.1   * 109*   CO2 17* 18*   BUN 16 19   CREATININE 0.6 0.8   GLUCOSE 68* 107*       ASSESSMENT/PLAN  Siddharth Fermin is a 28 y.o. female  POD# 0 s/p LTCS   - Continue Magnesium Sulfate Treatment 2g/hr, off @ 1309    - BPs normotensive/elevated   - Patient denies any s/s PreE   - UOP adequate   - PreE labs with elevated LFTs, borderline thrombocytopenia, P/C 18.85   - Currently controlled on Labetalol 800 mg TID, Procardia  mg qday   - Last IV anti-hypertensive Hydralazine 10 mg @ 1256 2024   - Continue to monitor closely    Pardeep Rubio MD  Ob/Gyn Resident  2024, 9:00 PM

## 2024-06-25 NOTE — CARE COORDINATION
06/25/24 0954   Readmission Assessment   Number of Days since last admission? 1-7 days  (Admitted for delivery)   Previous Disposition Home Alone   Who is being Interviewed Patient  (chart)   What was the patient's/caregiver's perception as to why they think they needed to return back to the hospital?   (admitted for delivery)   Did you visit your Primary Care Physician after you left the hospital, before you returned this time? No   Why weren't you able to visit your PCP? Did not have an appointment   Did you see a specialist, such as Cardiac, Pulmonary, Orthopedic Physician, etc. after you left the hospital? Yes  (MFM)   Who advised the patient to return to the hospital? Other (Comment);Physician  (admitted for delivery)   Does the patient report anything that got in the way of taking their medications? No   In our efforts to provide the best possible care to you and others like you, can you think of anything that we could have done to help you after you left the hospital the first time, so that you might not have needed to return so soon? Other (Comment)  (admitted for delivery)

## 2024-06-25 NOTE — PROGRESS NOTES
Resident Interval Magnesium Sulfate Note    Siddharth Fermin is a 28 y.o. female  POD# 0 s/p LTCS  The patient is resting comfortably. She denies headache, visual changes, abdominal pain, nausea, vomiting, backache, and dysuria. She denies any shortness of breath or chest pain. She denies change in her extremities, regarding swelling.    Continuous Medications:    oxytocin Stopped (24 1405)    sodium chloride      sodium chloride      magnesium sulfate 2,000 mg/hr (246)    lactated ringers IV soln 75 mL/hr at 24 2248       Vitals:    /72   Pulse 60   Temp 97.9 °F (36.6 °C) (Oral)   Resp 18   LMP 2023 (Approximate)   SpO2 95%   Breastfeeding Unknown       Physical Exam:  Chest: clear to auscultation bilaterally  Heart: RRR no murmur  Abdomen: soft, nontender, nondistended  Extremities: DTR normal Bilateral lower extremities Right: 2/4   Left: 2/4  Clonus: absent    Urine Output: 75 mL/hr    Labs:  Last Magnesium Level:   Lab Results   Component Value Date/Time    MG 6.0 2024 07:29 PM       BMP:    Recent Labs     24  0339 24  0937    139   K 4.0 4.1   * 109*   CO2 17* 18*   BUN 16 19   CREATININE 0.6 0.8   GLUCOSE 68* 107*       ASSESSMENT/PLAN  Siddharth Fermin is a 28 y.o. female  POD# 0 s/p LTCS   - Continue Magnesium Sulfate Treatment 2g/hr, off @ 1309    - BPs normotensive/elevated   - Patient denies any s/s PreE   - UOP adequate   - PreE labs with elevated LFTs, borderline thrombocytopenia, P/C 18.85   - Currently controlled on Labetalol 800 mg TID, Procardia  mg qday   - Last IV anti-hypertensive Hydralazine 10 mg @ 1256 2024   - Continue to monitor closely    Pardeep Rubio MD  Ob/Gyn Resident  2024, 12:40 AM

## 2024-06-25 NOTE — PLAN OF CARE
Problem: ABCDS Injury Assessment  Goal: Absence of physical injury  Outcome: Progressing     Problem: Pain  Goal: Verbalizes/displays adequate comfort level or baseline comfort level  Outcome: Progressing     Problem: Anxiety  Goal: Will report anxiety at manageable levels  Description: INTERVENTIONS:  1. Administer medication as ordered  2. Teach and rehearse alternative coping skills  3. Provide emotional support with 1:1 interaction with staff  Outcome: Progressing     Problem: Coping  Goal: Pt/Family able to verbalize concerns and demonstrate effective coping strategies  Description: INTERVENTIONS:  1. Assist patient/family to identify coping skills, available support systems and cultural and spiritual values  2. Provide emotional support, including active listening and acknowledgement of concerns of patient and caregivers  3. Reduce environmental stimuli, as able  4. Instruct patient/family in relaxation techniques, as appropriate  5. Assess for spiritual pain/suffering and initiate Spiritual Care, Psychosocial Clinical Specialist consults as needed  Outcome: Progressing     Problem: Respiratory - Adult  Goal: Achieves optimal ventilation and oxygenation  Outcome: Progressing     Problem: Skin/Tissue Integrity - Adult  Goal: Skin integrity remains intact  Outcome: Progressing  Goal: Incisions, wounds, or drain sites healing without S/S of infection  Outcome: Progressing  Goal: Oral mucous membranes remain intact  Outcome: Progressing     Problem: Musculoskeletal - Adult  Goal: Return mobility to safest level of function  Outcome: Progressing  Goal: Maintain proper alignment of affected body part  Outcome: Progressing  Goal: Return ADL status to a safe level of function  Outcome: Progressing     Problem: Gastrointestinal - Adult  Goal: Minimal or absence of nausea and vomiting  Outcome: Progressing  Goal: Maintains or returns to baseline bowel function  Outcome: Progressing  Goal: Maintains adequate  nutritional intake  Outcome: Progressing  Goal: Establish and maintain optimal ostomy function  Outcome: Progressing     Problem: Genitourinary - Adult  Goal: Absence of urinary retention  Outcome: Progressing  Goal: Urinary catheter remains patent  Outcome: Progressing     Problem: Infection - Adult  Goal: Absence of infection at discharge  Outcome: Progressing  Goal: Absence of infection during hospitalization  Outcome: Progressing  Goal: Absence of fever/infection during anticipated neutropenic period  Outcome: Progressing     Problem: Safety - Adult  Goal: Free from fall injury  Outcome: Progressing     Problem: Postpartum  Goal: Incisions, wounds, or drain sites healing without S/S of infection  Outcome: Progressing  Goal: Experiences normal postpartum course  Description:  Postpartum OB-Pregnancy care plan goal which identifies if the mother is experiencing a normal postpartum course  Outcome: Progressing  Goal: Appropriate maternal -  bonding  Description:  Postpartum OB-Pregnancy care plan goal which identifies if the mother and  are bonding appropriately  Outcome: Progressing  Goal: Establishment of infant feeding pattern  Description:  Postpartum OB-Pregnancy care plan goal which identifies if the mother is establishing a feeding pattern with their   Outcome: Progressing     Problem: Discharge Planning  Goal: Discharge to home or other facility with appropriate resources  Outcome: Progressing

## 2024-06-25 NOTE — PROGRESS NOTES
Resident Interval Magnesium Sulfate Note    Siddharth Fermin is a 28 y.o. female  POD# 1 s/p PLTCS with cHTN w/ MICHAEL w/ SF  The patient is resting comfortably. She denies headache, visual changes, and abdominal pain in the right upper quadrant. She denies any shortness of breath or chest pain. She denies change in her extremities, regarding swelling.    Continuous Medications:    oxytocin Stopped (24 1405)    sodium chloride      sodium chloride      magnesium sulfate 2,000 mg/hr (24 0759)    lactated ringers IV soln 75 mL/hr at 24 0454       Vitals:    Vitals:    24 0545 24 0645 24 0745 24 0845   BP: (!) 140/87 (!) 142/83 (!) 158/88 (!) 155/91   Pulse: 63 66 78 56   Resp:  18 16 18   Temp:   97.8 °F (36.6 °C)    TempSrc:   Oral    SpO2: 97% 96% 96% 100%         Physical Exam:  Chest: clear to auscultation bilaterally  Heart: RRR no murmur  Abdomen: soft, nontender, nondistended  Extremities: DTR normal Bilateral lower extremities Right: 2/4   Left: 2/4  Clonus: absent    Urine Output: 300 mL/hr; Clear urine    Labs:  Last Magnesium Level:   Lab Results   Component Value Date/Time    MG 6.0 2024 07:29 PM       BMP:    Recent Labs     24  0937      K 4.1   *   CO2 18*   BUN 19   CREATININE 0.8   GLUCOSE 107*       ASSESSMENT/PLAN  Siddharth Fermin is a 28 y.o. female  POD# 1 s/p PLTCS with cHTN w/ MICHAEL w/ SF   - Continue Magnesium Sulfate Treatment 2g/hr, off @ 1309 on 24   - BPs still elevated, nonsevere   - Patient denies any s/s PreE   - UOP adequate   - PreE labs abnormal (LFTs), P/C 18.85   - Currently BP regimen: Procardia  mg QD and Labetalol 800mg TID. Will continue to monitor BP closely and consider adding a third agent   - Last IV anti-hypertensive at 1256    - Continue to monitor closely    Brenda Dwyer DO  Ob/Gyn Resident  2024, 10:10 AM

## 2024-06-25 NOTE — CARE COORDINATION
Social Work     Sw reviewed medical record (current active problem list) and tox screens and found no current concerns.    Mom's daria at delivery is + amphetamines, medical record shows mom is on labetalol, so this is not a current concern.       Sw spoke with mom briefly to explain Sw role, inquire if any needs or concerns, and provide safe sleep education and discuss.  Mom denied any needs or questions and informs baby has a safe sleep environment (bassinet).     Mom denied any current s/s of anxiety or depression and is aware to reach out to OB if any s/s occur after dc.  Mom also reports she has a therapist at [facility] in [place].      Mom reports a really good support system with her mom, dad, and brother,  and denied any current questions or needs.      Mom reports this is her 1st baby.       Mom states ped will be Dr. Dotson.      Mom reports she resides alone and is linked with WIC, plans to work with [employer] to determine if she and baby eligible for medicaid, she does have private insurance via work.      Sw encouraged mom to reach out if any issues or concerns arise.

## 2024-06-26 LAB
MICROORGANISM SPEC CULT: NORMAL
SERVICE CMNT-IMP: NORMAL
SPECIMEN DESCRIPTION: NORMAL

## 2024-06-26 PROCEDURE — 6370000000 HC RX 637 (ALT 250 FOR IP)

## 2024-06-26 PROCEDURE — 1220000000 HC SEMI PRIVATE OB R&B

## 2024-06-26 PROCEDURE — 6360000002 HC RX W HCPCS

## 2024-06-26 RX ORDER — HYDRALAZINE HYDROCHLORIDE 10 MG/1
10 TABLET, FILM COATED ORAL 4 TIMES DAILY
Qty: 90 TABLET | Refills: 2 | Status: SHIPPED | OUTPATIENT
Start: 2024-06-26 | End: 2025-06-26

## 2024-06-26 RX ADMIN — ACETAMINOPHEN 1000 MG: 500 TABLET ORAL at 14:03

## 2024-06-26 RX ADMIN — POLYETHYLENE GLYCOL 3350 17 G: 17 POWDER, FOR SOLUTION ORAL at 08:08

## 2024-06-26 RX ADMIN — METRONIDAZOLE 500 MG: 500 TABLET ORAL at 07:01

## 2024-06-26 RX ADMIN — HYDRALAZINE HYDROCHLORIDE 10 MG: 10 TABLET, FILM COATED ORAL at 17:57

## 2024-06-26 RX ADMIN — NIFEDIPINE 120 MG: 30 TABLET, FILM COATED, EXTENDED RELEASE ORAL at 08:09

## 2024-06-26 RX ADMIN — SENNOSIDES AND DOCUSATE SODIUM 1 TABLET: 50; 8.6 TABLET ORAL at 08:09

## 2024-06-26 RX ADMIN — OXYCODONE HYDROCHLORIDE 5 MG: 5 TABLET ORAL at 21:41

## 2024-06-26 RX ADMIN — GABAPENTIN 300 MG: 300 CAPSULE ORAL at 08:10

## 2024-06-26 RX ADMIN — HYDRALAZINE HYDROCHLORIDE 10 MG: 10 TABLET, FILM COATED ORAL at 08:26

## 2024-06-26 RX ADMIN — CEPHALEXIN 500 MG: 500 CAPSULE ORAL at 07:01

## 2024-06-26 RX ADMIN — LABETALOL HYDROCHLORIDE 800 MG: 200 TABLET, FILM COATED ORAL at 17:56

## 2024-06-26 RX ADMIN — LABETALOL HYDROCHLORIDE 800 MG: 200 TABLET, FILM COATED ORAL at 21:33

## 2024-06-26 RX ADMIN — LABETALOL HYDROCHLORIDE 800 MG: 200 TABLET, FILM COATED ORAL at 08:10

## 2024-06-26 RX ADMIN — ACETAMINOPHEN 1000 MG: 500 TABLET ORAL at 00:56

## 2024-06-26 RX ADMIN — GABAPENTIN 300 MG: 300 CAPSULE ORAL at 14:03

## 2024-06-26 RX ADMIN — ENOXAPARIN SODIUM 50 MG: 100 INJECTION SUBCUTANEOUS at 08:09

## 2024-06-26 RX ADMIN — ACETAMINOPHEN 1000 MG: 500 TABLET ORAL at 07:00

## 2024-06-26 RX ADMIN — SENNOSIDES AND DOCUSATE SODIUM 1 TABLET: 50; 8.6 TABLET ORAL at 21:33

## 2024-06-26 RX ADMIN — GABAPENTIN 300 MG: 300 CAPSULE ORAL at 21:33

## 2024-06-26 RX ADMIN — ACETAMINOPHEN 1000 MG: 500 TABLET ORAL at 21:34

## 2024-06-26 RX ADMIN — HYDRALAZINE HYDROCHLORIDE 10 MG: 10 TABLET, FILM COATED ORAL at 21:33

## 2024-06-26 RX ADMIN — HYDRALAZINE HYDROCHLORIDE 10 MG: 10 TABLET, FILM COATED ORAL at 14:03

## 2024-06-26 RX ADMIN — Medication 1 TABLET: at 08:09

## 2024-06-26 ASSESSMENT — PAIN SCALES - GENERAL
PAINLEVEL_OUTOF10: 5
PAINLEVEL_OUTOF10: 4
PAINLEVEL_OUTOF10: 5
PAINLEVEL_OUTOF10: 5

## 2024-06-26 ASSESSMENT — PAIN DESCRIPTION - LOCATION
LOCATION: ABDOMEN

## 2024-06-26 ASSESSMENT — PAIN DESCRIPTION - DESCRIPTORS
DESCRIPTORS: CRAMPING;DISCOMFORT
DESCRIPTORS: DISCOMFORT;SORE
DESCRIPTORS: DISCOMFORT;SORE
DESCRIPTORS: ACHING;DISCOMFORT;CRAMPING

## 2024-06-26 ASSESSMENT — PAIN DESCRIPTION - PAIN TYPE: TYPE: SURGICAL PAIN

## 2024-06-26 ASSESSMENT — PAIN DESCRIPTION - FREQUENCY: FREQUENCY: INTERMITTENT

## 2024-06-26 ASSESSMENT — PAIN - FUNCTIONAL ASSESSMENT
PAIN_FUNCTIONAL_ASSESSMENT: ACTIVITIES ARE NOT PREVENTED
PAIN_FUNCTIONAL_ASSESSMENT: ACTIVITIES ARE NOT PREVENTED

## 2024-06-26 ASSESSMENT — PAIN DESCRIPTION - ORIENTATION
ORIENTATION: LOWER

## 2024-06-26 NOTE — FLOWSHEET NOTE
IV unable to be flushed this morning. Writer removes IV and discusses plan of care, provider preference of IV in place while on unit given BP issues during stay. Patient verbalizes understanding and would prefer not to have another IV placed at this time given her BP med regimen has been helping BP.     Patient to NICU via wheelchair.     Writer encourages patient to return by 1300 for BP check and BP meds. Pt verbalizes understanding.

## 2024-06-26 NOTE — PROGRESS NOTES
POST OPERATIVE DAY # 2    Siddharth Fermin is a 28 y.o. female   This patient was seen and examined today. PLTCS (gen) on 6/24/24    Her pregnancy was complicated by:   Patient Active Problem List   Diagnosis    PCO (polycystic ovaries)    Generalized anxiety disorder    Lumbar radiculopathy    Migraine with aura    S/P laparoscopic sleeve gastrectomy    Morbid obesity (HCC)    Brain concussion    Closed fracture of pelvis (HCC)    Depression    Dysmenorrhea    Moderate episode of recurrent major depressive disorder (HCC)    Rh negative state in antepartum period    cHTN (meds) w/ MICHAEL w/ SF (G1)    Desires primary CS    FGR (AC<10th%)    Chronic cholecystitis with calculus    GERD    PLTCS (gen) 6/24/24 F Apg 8/9 Wt 3#3    Chronic hypertension with superimposed preeclampsia       Today she is doing well without any majoy chief complaint. Stated she felt intermittent lightheadedness when she stood up to go to bathroom. Her lochia is light. She denies chest pain, shortness of breath, headache, blurred vision and peripheral edema. She is bottle feeding and she denies any signs or symptoms of mastitis.  She is  ambulating well. She is voiding without difficulty. She currently denies S/S of postpartum depression. Flatus present.  Bowel movement present. She is tolerating solids.    Vital Signs:  Vitals:    06/25/24 1300 06/25/24 1550 06/25/24 2000 06/25/24 2122   BP: (!) 155/89 125/79  130/76   Pulse: 58 65 66    Resp: 18 16 16    Temp:  98 °F (36.7 °C) 98.3 °F (36.8 °C)    TempSrc:  Oral Oral    SpO2: 96% 98% 98%          Urine Input & Output last 24hrs:     Intake/Output Summary (Last 24 hours) at 6/26/2024 0113  Last data filed at 6/25/2024 1840  Gross per 24 hour   Intake 1877.97 ml   Output 1650 ml   Net 227.97 ml       Physical Exam:  General:  no apparent distress, alert and cooperative  Neurologic:  alert, oriented, normal speech, no focal findings or movement disorder noted  Lungs:  No increased work of

## 2024-06-26 NOTE — LACTATION NOTE
To nicu to attempt breast, baby too sleepy at this time. Taught mom nipple shield placement and reviewed pumping frequency. No other questions or concerns at this time.

## 2024-06-27 PROCEDURE — 6370000000 HC RX 637 (ALT 250 FOR IP)

## 2024-06-27 PROCEDURE — 1220000000 HC SEMI PRIVATE OB R&B

## 2024-06-27 RX ADMIN — SENNOSIDES AND DOCUSATE SODIUM 1 TABLET: 50; 8.6 TABLET ORAL at 09:29

## 2024-06-27 RX ADMIN — ACETAMINOPHEN 1000 MG: 500 TABLET ORAL at 04:41

## 2024-06-27 RX ADMIN — CYCLOBENZAPRINE 10 MG: 10 TABLET, FILM COATED ORAL at 16:25

## 2024-06-27 RX ADMIN — ACETAMINOPHEN 1000 MG: 500 TABLET ORAL at 22:20

## 2024-06-27 RX ADMIN — Medication 1 TABLET: at 09:28

## 2024-06-27 RX ADMIN — NIFEDIPINE 120 MG: 30 TABLET, FILM COATED, EXTENDED RELEASE ORAL at 09:27

## 2024-06-27 RX ADMIN — LABETALOL HYDROCHLORIDE 800 MG: 200 TABLET, FILM COATED ORAL at 16:25

## 2024-06-27 RX ADMIN — ACETAMINOPHEN 1000 MG: 500 TABLET ORAL at 16:25

## 2024-06-27 RX ADMIN — HYDRALAZINE HYDROCHLORIDE 10 MG: 10 TABLET, FILM COATED ORAL at 16:25

## 2024-06-27 RX ADMIN — GABAPENTIN 300 MG: 300 CAPSULE ORAL at 16:25

## 2024-06-27 RX ADMIN — LABETALOL HYDROCHLORIDE 800 MG: 200 TABLET, FILM COATED ORAL at 09:29

## 2024-06-27 RX ADMIN — LABETALOL HYDROCHLORIDE 800 MG: 200 TABLET, FILM COATED ORAL at 22:19

## 2024-06-27 RX ADMIN — OXYCODONE HYDROCHLORIDE 5 MG: 5 TABLET ORAL at 09:28

## 2024-06-27 RX ADMIN — HYDRALAZINE HYDROCHLORIDE 10 MG: 10 TABLET, FILM COATED ORAL at 22:20

## 2024-06-27 RX ADMIN — SENNOSIDES AND DOCUSATE SODIUM 1 TABLET: 50; 8.6 TABLET ORAL at 22:19

## 2024-06-27 RX ADMIN — GABAPENTIN 300 MG: 300 CAPSULE ORAL at 09:28

## 2024-06-27 RX ADMIN — HYDRALAZINE HYDROCHLORIDE 10 MG: 10 TABLET, FILM COATED ORAL at 09:28

## 2024-06-27 ASSESSMENT — PAIN DESCRIPTION - LOCATION
LOCATION: ABDOMEN;INCISION
LOCATION: INCISION;ABDOMEN
LOCATION: ABDOMEN

## 2024-06-27 ASSESSMENT — PAIN SCALES - GENERAL
PAINLEVEL_OUTOF10: 6
PAINLEVEL_OUTOF10: 3
PAINLEVEL_OUTOF10: 5
PAINLEVEL_OUTOF10: 3
PAINLEVEL_OUTOF10: 1

## 2024-06-27 ASSESSMENT — PAIN DESCRIPTION - FREQUENCY
FREQUENCY: INTERMITTENT
FREQUENCY: INTERMITTENT

## 2024-06-27 ASSESSMENT — PAIN DESCRIPTION - PAIN TYPE
TYPE: SURGICAL PAIN
TYPE: SURGICAL PAIN

## 2024-06-27 ASSESSMENT — PAIN DESCRIPTION - ONSET: ONSET: ON-GOING

## 2024-06-27 ASSESSMENT — PAIN DESCRIPTION - ORIENTATION
ORIENTATION: LOWER
ORIENTATION: LOWER

## 2024-06-27 ASSESSMENT — PAIN DESCRIPTION - DESCRIPTORS
DESCRIPTORS: DISCOMFORT;TENDER
DESCRIPTORS: DISCOMFORT;SORE
DESCRIPTORS: CRAMPING;BURNING
DESCRIPTORS: ACHING

## 2024-06-27 NOTE — PLAN OF CARE
Problem: ABCDS Injury Assessment  Goal: Absence of physical injury  Outcome: Progressing     Problem: Pain  Goal: Verbalizes/displays adequate comfort level or baseline comfort level  Outcome: Progressing  Flowsheets (Taken 6/26/2024 2133)  Verbalizes/displays adequate comfort level or baseline comfort level:   Encourage patient to monitor pain and request assistance   Assess pain using appropriate pain scale   Administer analgesics based on type and severity of pain and evaluate response   Implement non-pharmacological measures as appropriate and evaluate response     Problem: Anxiety  Goal: Will report anxiety at manageable levels  Description: INTERVENTIONS:  1. Administer medication as ordered  2. Teach and rehearse alternative coping skills  3. Provide emotional support with 1:1 interaction with staff  Outcome: Progressing     Problem: Coping  Goal: Pt/Family able to verbalize concerns and demonstrate effective coping strategies  Description: INTERVENTIONS:  1. Assist patient/family to identify coping skills, available support systems and cultural and spiritual values  2. Provide emotional support, including active listening and acknowledgement of concerns of patient and caregivers  3. Reduce environmental stimuli, as able  4. Instruct patient/family in relaxation techniques, as appropriate  5. Assess for spiritual pain/suffering and initiate Spiritual Care, Psychosocial Clinical Specialist consults as needed  Outcome: Progressing     Problem: Respiratory - Adult  Goal: Achieves optimal ventilation and oxygenation  Outcome: Progressing     Problem: Skin/Tissue Integrity - Adult  Goal: Incisions, wounds, or drain sites healing without S/S of infection  Outcome: Progressing  Goal: Oral mucous membranes remain intact  Outcome: Progressing     Problem: Musculoskeletal - Adult  Goal: Return mobility to safest level of function  Outcome: Progressing  Goal: Maintain proper alignment of affected body part  Outcome:  Progressing  Goal: Return ADL status to a safe level of function  Outcome: Progressing     Problem: Gastrointestinal - Adult  Goal: Minimal or absence of nausea and vomiting  Outcome: Progressing  Goal: Maintains or returns to baseline bowel function  Outcome: Progressing  Goal: Maintains adequate nutritional intake  Outcome: Progressing  Goal: Establish and maintain optimal ostomy function  Outcome: Progressing     Problem: Postpartum  Goal: Incisions, wounds, or drain sites healing without S/S of infection  Outcome: Progressing  Goal: Experiences normal postpartum course  Description:  Postpartum OB-Pregnancy care plan goal which identifies if the mother is experiencing a normal postpartum course  Outcome: Progressing  Goal: Appropriate maternal -  bonding  Description:  Postpartum OB-Pregnancy care plan goal which identifies if the mother and  are bonding appropriately  Outcome: Progressing  Goal: Establishment of infant feeding pattern  Description:  Postpartum OB-Pregnancy care plan goal which identifies if the mother is establishing a feeding pattern with their   Outcome: Progressing

## 2024-06-27 NOTE — PROGRESS NOTES
POST OPERATIVE DAY # 3    Siddharth Fermin is a 28 y.o. female   This patient was seen and examined today. PLTCS (gen) on 6/24/24    Her pregnancy was complicated by:   Patient Active Problem List   Diagnosis    PCO (polycystic ovaries)    Generalized anxiety disorder    Lumbar radiculopathy    Migraine with aura    S/P laparoscopic sleeve gastrectomy    Morbid obesity (HCC)    Brain concussion    Closed fracture of pelvis (HCC)    Depression    Dysmenorrhea    Moderate episode of recurrent major depressive disorder (HCC)    Rh negative state in antepartum period    cHTN (meds) w/ MICHAEL w/ SF (G1)    Desires primary CS    FGR (AC<10th%)    Chronic cholecystitis with calculus    GERD    PLTCS (gen) 6/24/24 F Apg 8/9 Wt 3#3    Chronic hypertension with superimposed preeclampsia       Today she is doing well without any chief complaint. Her lochia is light. She denies chest pain, shortness of breath, headache, lightheadedness, blurred vision and peripheral edema. She is bottle feeding and she denies any signs or symptoms of mastitis.  She is ambulating well. She is voiding without difficulty. She currently denies S/S of postpartum depression. Flatus present.  Bowel movement present. She is tolerating solids.    Vital Signs:  Vitals:    06/26/24 2133 06/26/24 2141 06/27/24 0015 06/27/24 0430   BP: 126/87  103/71 (!) 142/90   Pulse: 74  67 79   Resp: 16 16 18 20   Temp: 98.5 °F (36.9 °C)  97.9 °F (36.6 °C) 98.2 °F (36.8 °C)   TempSrc: Oral  Oral Oral   SpO2: 95%  96% 97%         Urine Input & Output last 24hrs:   No intake or output data in the 24 hours ending 06/27/24 0636    Physical Exam:  General:  no apparent distress, alert and cooperative  Neurologic:  alert, oriented, normal speech, no focal findings or movement disorder noted  Lungs:  No increased work of breathing, good air exchange, no coughing or wheezing  Heart:  Regular rate and rhythm   Abdomen: abdomen soft, non-distended, non-tender  Fundus:

## 2024-06-28 VITALS
HEART RATE: 78 BPM | DIASTOLIC BLOOD PRESSURE: 87 MMHG | RESPIRATION RATE: 18 BRPM | OXYGEN SATURATION: 97 % | TEMPERATURE: 98.3 F | SYSTOLIC BLOOD PRESSURE: 137 MMHG

## 2024-06-28 LAB — SURGICAL PATHOLOGY REPORT: NORMAL

## 2024-06-28 PROCEDURE — 6370000000 HC RX 637 (ALT 250 FOR IP)

## 2024-06-28 RX ORDER — LABETALOL 200 MG/1
800 TABLET, FILM COATED ORAL 3 TIMES DAILY
Qty: 120 TABLET | Refills: 5 | Status: SHIPPED | OUTPATIENT
Start: 2024-06-28

## 2024-06-28 RX ORDER — NIFEDIPINE 60 MG/1
120 TABLET, EXTENDED RELEASE ORAL DAILY
Qty: 60 TABLET | Refills: 3 | Status: SHIPPED | OUTPATIENT
Start: 2024-06-28

## 2024-06-28 RX ADMIN — LABETALOL HYDROCHLORIDE 800 MG: 200 TABLET, FILM COATED ORAL at 08:38

## 2024-06-28 RX ADMIN — GABAPENTIN 300 MG: 300 CAPSULE ORAL at 00:06

## 2024-06-28 RX ADMIN — NIFEDIPINE 120 MG: 30 TABLET, FILM COATED, EXTENDED RELEASE ORAL at 08:39

## 2024-06-28 RX ADMIN — Medication 1 TABLET: at 08:38

## 2024-06-28 RX ADMIN — SENNOSIDES AND DOCUSATE SODIUM 1 TABLET: 50; 8.6 TABLET ORAL at 08:38

## 2024-06-28 RX ADMIN — GABAPENTIN 300 MG: 300 CAPSULE ORAL at 08:38

## 2024-06-28 RX ADMIN — CYCLOBENZAPRINE 10 MG: 10 TABLET, FILM COATED ORAL at 00:06

## 2024-06-28 RX ADMIN — ACETAMINOPHEN 1000 MG: 500 TABLET ORAL at 08:39

## 2024-06-28 RX ADMIN — HYDRALAZINE HYDROCHLORIDE 10 MG: 10 TABLET, FILM COATED ORAL at 08:39

## 2024-06-28 ASSESSMENT — PAIN DESCRIPTION - LOCATION: LOCATION: ABDOMEN

## 2024-06-28 ASSESSMENT — PAIN SCALES - GENERAL: PAINLEVEL_OUTOF10: 4

## 2024-06-28 ASSESSMENT — PAIN DESCRIPTION - DESCRIPTORS: DESCRIPTORS: BURNING;SORE

## 2024-06-28 NOTE — PROGRESS NOTES
POST OPERATIVE DAY # 4    Siddharth Fermin is a 28 y.o. female   This patient was seen and examined today. PLTCS (gen) on 6/24/24    Her pregnancy was complicated by:   Patient Active Problem List   Diagnosis    PCO (polycystic ovaries)    Generalized anxiety disorder    Lumbar radiculopathy    Migraine with aura    S/P laparoscopic sleeve gastrectomy    Morbid obesity (HCC)    Brain concussion    Closed fracture of pelvis (HCC)    Depression    Dysmenorrhea    Moderate episode of recurrent major depressive disorder (HCC)    Rh negative state in antepartum period    cHTN (meds) w/ MICHAEL w/ SF (G1)    Desires primary CS    FGR (AC<10th%)    Chronic cholecystitis with calculus    GERD    PLTCS (gen) 6/24/24 F Apg 8/9 Wt 3#3    Chronic hypertension with superimposed preeclampsia       Today she is doing well without any chief complaint. Her lochia is light. She denies chest pain, shortness of breath, headache, lightheadedness, blurred vision and peripheral edema. She is bottle feeding and she denies any signs or symptoms of mastitis.  She is ambulating well. She is voiding without difficulty. She currently denies S/S of postpartum depression. Flatus present.  Bowel movement present. She is tolerating solids.    Vital Signs:  Vitals:    06/27/24 0928 06/27/24 1630 06/27/24 2202 06/27/24 2219   BP: (!) 149/91 138/85 119/72 119/72   Pulse: 73 68 80 80   Resp: 18 18 15    Temp: 98.2 °F (36.8 °C) 98.5 °F (36.9 °C) 98.6 °F (37 °C)    TempSrc: Oral Oral Oral    SpO2: 98%  96%          Urine Input & Output last 24hrs:   No intake or output data in the 24 hours ending 06/28/24 0130    Physical Exam:  General:  no apparent distress, alert and cooperative  Neurologic:  alert, oriented, normal speech, no focal findings or movement disorder noted  Lungs:  No increased work of breathing, good air exchange, no coughing or wheezing  Heart:  Regular rate and rhythm   Abdomen: abdomen soft, non-distended, non-tender  Fundus:

## 2024-06-28 NOTE — LACTATION NOTE
Mom pumping and baby being supplemented with formula until milk volume increases.  Encouraged to continue use of symphony pump while baby in NICU. Has [employer]ingly personal care pump, discussed setup and use.  Discharge instructions and LC follow up reviewed.

## 2024-06-28 NOTE — PROGRESS NOTES
CLINICAL PHARMACY NOTE: MEDS TO BEDS    Total # of Prescriptions Filled: 6   The following medications were delivered to the patient:  Tylenol  Senexon  Oxycodone  Gabapentin  Hydralazine  nifedipine    Additional Documentation:

## 2024-07-02 NOTE — PROGRESS NOTES
[facility], [facility]  [address]  [address]  [place] [address]  Dept: [phone]     Siddharth BATRES Zander  7:52 AM  7/3/24            The patient was seen. She has no chief complaints today. She delivered by  section on 24 for CHTN and FGR with 2/8 BPP. She is  breast feeding and there is not any signs or symptoms of mastitis.  The patient completed the E.P.D.S. Evaluation form and scored 3.  She does not have any signs or symptoms of post partum depression. She denies any suicidal thoughts with a plan, intent to harm others, and delusional ideas. Today her lochia is light she denies any dizziness or shortness of breath.      Her pregnancy was complicated by:   Patient Active Problem List    Diagnosis Date Noted    PLTCS (gen) 24 F Apg / Wt 3#3 2024     Priority: High    Morbid obesity (HCC) 2022     Priority: Medium    S/P laparoscopic sleeve gastrectomy 2022     Priority: Medium    Migraine with aura 2021     Priority: Medium    Lumbar radiculopathy 2020     Priority: Medium    Generalized anxiety disorder 2017     Priority: Medium    GERD 2024    Chronic hypertension with superimposed preeclampsia 2024    Chronic cholecystitis with calculus 2024    FGR (AC<10th%) 2024     Overview Note:     With CHTN w/ MICHAEL window for delivery 34-37w6d      Desires primary CS 2024     Overview Note:     Due to hx of pelvic fx at 13yo      cHTN (meds) w/ MICHAEL w/ SF (G1) 2024     Overview Note:     24: Started on labetalol 200 BID  PreE labs, P/C ordered  MICHAEL without SF diagnosed  at 32w6d - PreE labs wnl x2, P/C 0.47  24:  upon discharge from hospital, patient started on Procardia 120 XL & Labetalol 200 TID  24: Procardia 120 XL & Labetalol 800 TID  POD#1 (): Hydralazine 10mg QID added to Procardia and Labetalol      Rh negative state

## 2024-07-03 ENCOUNTER — POSTPARTUM VISIT (OUTPATIENT)
Dept: OBGYN CLINIC | Age: 29
End: 2024-07-03

## 2024-07-03 VITALS
HEIGHT: 57 IN | WEIGHT: 170 LBS | BODY MASS INDEX: 36.68 KG/M2 | SYSTOLIC BLOOD PRESSURE: 113 MMHG | DIASTOLIC BLOOD PRESSURE: 77 MMHG | HEART RATE: 72 BPM

## 2024-07-03 PROCEDURE — 99024 POSTOP FOLLOW-UP VISIT: CPT | Performed by: OBSTETRICS & GYNECOLOGY

## 2024-07-17 ENCOUNTER — POSTPARTUM VISIT (OUTPATIENT)
Dept: OBGYN CLINIC | Age: 29
End: 2024-07-17

## 2024-07-17 VITALS
WEIGHT: 167 LBS | HEART RATE: 94 BPM | SYSTOLIC BLOOD PRESSURE: 136 MMHG | DIASTOLIC BLOOD PRESSURE: 96 MMHG | BODY MASS INDEX: 36.14 KG/M2

## 2024-07-17 PROCEDURE — 0503F POSTPARTUM CARE VISIT: CPT | Performed by: OBSTETRICS & GYNECOLOGY

## 2024-07-17 NOTE — PROGRESS NOTES
[facility], [facility]  [address]  [address]  [place] [address]  Dept: [phone]     Siddharth BATRES Zander  2:10 PM  24            The patient was seen. She has no chief complaints today. She delivered by  section on 24 at 33w6d for CHTN with FGR and 2/8 BPP. She is not breast feeding and there is not any signs or symptoms of mastitis.  The patient completed the E.P.D.S. Evaluation form and scored 0.  She does not have any signs or symptoms of post partum depression. She denies any suicidal thoughts with a plan, intent to harm others, and delusional ideas. Today her lochia is light she denies any dizziness or shortness of breath.  She stopped the procardia and labetalol because she was having BPs in the low 100s and started to become symptomatic    Her pregnancy was complicated by:   Patient Active Problem List    Diagnosis Date Noted    PLTCS (gen) 24 F Apg  Wt 3#3 2024     Priority: High    Morbid obesity (HCC) 2022     Priority: Medium    S/P laparoscopic sleeve gastrectomy 2022     Priority: Medium    Migraine with aura 2021     Priority: Medium    Lumbar radiculopathy 2020     Priority: Medium    Generalized anxiety disorder 2017     Priority: Medium    GERD 2024    Chronic hypertension with superimposed preeclampsia 2024    Chronic cholecystitis with calculus 2024    FGR (AC<10th%) 2024     Overview Note:     With CHTN w/ MICHAEL window for delivery 34-37w6d      Desires primary CS 2024     Overview Note:     Due to hx of pelvic fx at 13yo      cHTN (meds) w/ MICHAEL w/ SF (G1) 2024     Overview Note:     24: Started on labetalol 200 BID  PreE labs, P/C ordered  MICHAEL without SF diagnosed  at 32w6d - PreE labs wnl x2, P/C 0.47  24:  upon discharge from hospital, patient started on Procardia 120 XL & Labetalol 200 TID  24:

## 2024-08-07 ENCOUNTER — PATIENT MESSAGE (OUTPATIENT)
Dept: OBGYN CLINIC | Age: 29
End: 2024-08-07

## 2024-10-16 ENCOUNTER — OFFICE VISIT (OUTPATIENT)
Dept: FAMILY MEDICINE CLINIC | Age: 29
End: 2024-10-16
Payer: COMMERCIAL

## 2024-10-16 ENCOUNTER — HOSPITAL ENCOUNTER (OUTPATIENT)
Age: 29
Setting detail: SPECIMEN
Discharge: HOME | End: 2024-10-16

## 2024-10-16 VITALS
SYSTOLIC BLOOD PRESSURE: 154 MMHG | WEIGHT: 168 LBS | DIASTOLIC BLOOD PRESSURE: 102 MMHG | HEIGHT: 57 IN | BODY MASS INDEX: 36.24 KG/M2 | HEART RATE: 87 BPM

## 2024-10-16 DIAGNOSIS — R42 LIGHTHEADEDNESS: Primary | ICD-10-CM

## 2024-10-16 DIAGNOSIS — Z98.84 S/P LAPAROSCOPIC SLEEVE GASTRECTOMY: ICD-10-CM

## 2024-10-16 DIAGNOSIS — R42 LIGHTHEADEDNESS: ICD-10-CM

## 2024-10-16 DIAGNOSIS — Z86.2 HISTORY OF ANEMIA: ICD-10-CM

## 2024-10-16 DIAGNOSIS — R11.0 NAUSEA: ICD-10-CM

## 2024-10-16 DIAGNOSIS — R42 DIZZINESS: ICD-10-CM

## 2024-10-16 LAB
ERYTHROCYTE [DISTWIDTH] IN BLOOD BY AUTOMATED COUNT: 12.1 % (ref 11.8–14.4)
FOLATE SERPL-MCNC: 6.6 NG/ML (ref 4.8–24.2)
HCT VFR BLD AUTO: 36.2 % (ref 36.3–47.1)
HGB BLD-MCNC: 11.6 G/DL (ref 11.9–15.1)
MCH RBC QN AUTO: 26 PG (ref 25.2–33.5)
MCHC RBC AUTO-ENTMCNC: 32 G/DL (ref 28.4–34.8)
MCV RBC AUTO: 81 FL (ref 82.6–102.9)
NRBC BLD-RTO: 0 PER 100 WBC
PLATELET # BLD AUTO: 310 K/UL (ref 138–453)
PMV BLD AUTO: 10.1 FL (ref 8.1–13.5)
RBC # BLD AUTO: 4.47 M/UL (ref 3.95–5.11)
VIT B12 SERPL-MCNC: 318 PG/ML (ref 232–1245)
WBC OTHER # BLD: 7.7 K/UL (ref 3.5–11.3)

## 2024-10-16 PROCEDURE — 99214 OFFICE O/P EST MOD 30 MIN: CPT | Performed by: NURSE PRACTITIONER

## 2024-10-16 RX ORDER — ONDANSETRON 4 MG/1
4 TABLET, FILM COATED ORAL 3 TIMES DAILY PRN
Qty: 30 TABLET | Refills: 0 | Status: SHIPPED | OUTPATIENT
Start: 2024-10-16

## 2024-10-16 SDOH — ECONOMIC STABILITY: FOOD INSECURITY: WITHIN THE PAST 12 MONTHS, THE FOOD YOU BOUGHT JUST DIDN'T LAST AND YOU DIDN'T HAVE MONEY TO GET MORE.: NEVER TRUE

## 2024-10-16 SDOH — ECONOMIC STABILITY: FOOD INSECURITY: WITHIN THE PAST 12 MONTHS, YOU WORRIED THAT YOUR FOOD WOULD RUN OUT BEFORE YOU GOT MONEY TO BUY MORE.: NEVER TRUE

## 2024-10-16 SDOH — ECONOMIC STABILITY: INCOME INSECURITY: HOW HARD IS IT FOR YOU TO PAY FOR THE VERY BASICS LIKE FOOD, HOUSING, MEDICAL CARE, AND HEATING?: NOT HARD AT ALL

## 2024-10-16 ASSESSMENT — PATIENT HEALTH QUESTIONNAIRE - PHQ9
1. LITTLE INTEREST OR PLEASURE IN DOING THINGS: SEVERAL DAYS
4. FEELING TIRED OR HAVING LITTLE ENERGY: SEVERAL DAYS
2. FEELING DOWN, DEPRESSED OR HOPELESS: SEVERAL DAYS
SUM OF ALL RESPONSES TO PHQ QUESTIONS 1-9: 4
SUM OF ALL RESPONSES TO PHQ QUESTIONS 1-9: 4
9. THOUGHTS THAT YOU WOULD BE BETTER OFF DEAD, OR OF HURTING YOURSELF: NOT AT ALL
8. MOVING OR SPEAKING SO SLOWLY THAT OTHER PEOPLE COULD HAVE NOTICED. OR THE OPPOSITE, BEING SO FIGETY OR RESTLESS THAT YOU HAVE BEEN MOVING AROUND A LOT MORE THAN USUAL: NOT AT ALL
SUM OF ALL RESPONSES TO PHQ QUESTIONS 1-9: 4
SUM OF ALL RESPONSES TO PHQ9 QUESTIONS 1 & 2: 2
5. POOR APPETITE OR OVEREATING: NOT AT ALL
7. TROUBLE CONCENTRATING ON THINGS, SUCH AS READING THE NEWSPAPER OR WATCHING TELEVISION: NOT AT ALL
6. FEELING BAD ABOUT YOURSELF - OR THAT YOU ARE A FAILURE OR HAVE LET YOURSELF OR YOUR FAMILY DOWN: SEVERAL DAYS
SUM OF ALL RESPONSES TO PHQ QUESTIONS 1-9: 4
3. TROUBLE FALLING OR STAYING ASLEEP: NOT AT ALL
10. IF YOU CHECKED OFF ANY PROBLEMS, HOW DIFFICULT HAVE THESE PROBLEMS MADE IT FOR YOU TO DO YOUR WORK, TAKE CARE OF THINGS AT HOME, OR GET ALONG WITH OTHER PEOPLE: NOT DIFFICULT AT ALL

## 2024-10-16 NOTE — PROGRESS NOTES
Siddharth Fermin is a 29 y.o. female who presents in office today with Self follow up on recent condition of     Chief Complaint   Patient presents with    Dizziness     Increased light headness and dizziness, feeling sick after eating, nauseous and fatigued.     Depression     Anxiety/depression - would like to discuss medication.         History of Present Illness:     HPI    Here with complaints of increased lightheadedness and dizziness. She had similar symptoms while pregnant with blood sugar with readings in 60s.  In response, increased protein and water intake. At this time, feeling sick after eating most any food. Has tried to stop eating dairy with no improvement.  She has similar complaint immediately after bariatric surgery however was more triggered by specific foods. Drinking approx 3 - 32 oz bottles of water daily. Has considered restarting protein shakes.     Has not had labs since 2024, post , at which time she was anemic. Reports was advised she would receive prescription but has not. She is not taking any medications presently, including bariatric multivitamin.   She is not breastfeeding. Denies any risk of pregnancy.  Reports negative home pregnancy test last week. LMP end of last month, due end of this month, occurring regularly.     Anxiety has been increased. Mild depression. She is seeing a therapist. Not necessarily feeling need for medication.  Blood pressure elevated today, which she attributes to anxiety.      Care gaps:   Colon CA screening:   n/a  Vaccines:   Hepatitis C/HIV screens:    discussed - low risk  Breast CA screening:   n/a  OB/GYN, cervical CA screenin22 negative, Dr Lechuga  Depression Screenin --> 4    Health Maintenance Due   Topic Date Due    HIV screen  Never done    Flu vaccine (1) 2024    COVID-19 Vaccine ( - -24 season) 2024        Patient Care Team:  Miguelina Houston APRN - CNP as PCP - General (Nurse

## 2024-10-17 LAB
25(OH)D3 SERPL-MCNC: 19.9 NG/ML (ref 30–100)
ALBUMIN SERPL-MCNC: 4.1 G/DL (ref 3.5–5.2)
ALBUMIN/GLOB SERPL: 1 {RATIO} (ref 1–2.5)
ALP SERPL-CCNC: 90 U/L (ref 35–104)
ALT SERPL-CCNC: 25 U/L (ref 10–35)
ANION GAP SERPL CALCULATED.3IONS-SCNC: 11 MMOL/L (ref 9–16)
AST SERPL-CCNC: 26 U/L (ref 10–35)
BILIRUB SERPL-MCNC: 0.9 MG/DL (ref 0–1.2)
BUN SERPL-MCNC: 14 MG/DL (ref 6–20)
CALCIUM SERPL-MCNC: 8.8 MG/DL (ref 8.6–10.4)
CHLORIDE SERPL-SCNC: 105 MMOL/L (ref 98–107)
CO2 SERPL-SCNC: 21 MMOL/L (ref 20–31)
CREAT SERPL-MCNC: 0.7 MG/DL (ref 0.5–0.9)
EST. AVERAGE GLUCOSE BLD GHB EST-MCNC: 91 MG/DL
FERRITIN SERPL-MCNC: 8 NG/ML (ref 15–150)
GFR, ESTIMATED: >90 ML/MIN/1.73M2
GLUCOSE P FAST SERPL-MCNC: 89 MG/DL (ref 74–99)
HBA1C MFR BLD: 4.8 % (ref 4–6)
IRON SATN MFR SERPL: 9 % (ref 20–55)
IRON SERPL-MCNC: 35 UG/DL (ref 37–145)
MAGNESIUM SERPL-MCNC: 2 MG/DL (ref 1.6–2.6)
POTASSIUM SERPL-SCNC: 4.3 MMOL/L (ref 3.7–5.3)
PROT SERPL-MCNC: 7 G/DL (ref 6.6–8.7)
SODIUM SERPL-SCNC: 137 MMOL/L (ref 136–145)
TIBC SERPL-MCNC: 408 UG/DL (ref 250–450)
TSH SERPL DL<=0.05 MIU/L-ACNC: 1.76 UIU/ML (ref 0.27–4.2)
UNSATURATED IRON BINDING CAPACITY: 373 UG/DL (ref 112–347)

## 2024-10-22 DIAGNOSIS — E55.9 VITAMIN D DEFICIENCY: Primary | ICD-10-CM

## 2024-10-22 RX ORDER — ERGOCALCIFEROL 1.25 MG/1
50000 CAPSULE, LIQUID FILLED ORAL WEEKLY
Qty: 12 CAPSULE | Refills: 1 | Status: SHIPPED | OUTPATIENT
Start: 2024-10-22

## 2024-10-23 ASSESSMENT — ENCOUNTER SYMPTOMS: NAUSEA: 1

## 2024-10-30 ENCOUNTER — OFFICE VISIT (OUTPATIENT)
Dept: FAMILY MEDICINE CLINIC | Age: 29
End: 2024-10-30
Payer: COMMERCIAL

## 2024-10-30 ENCOUNTER — HOSPITAL ENCOUNTER (OUTPATIENT)
Age: 29
Setting detail: SPECIMEN
Discharge: HOME | End: 2024-10-30

## 2024-10-30 VITALS
WEIGHT: 174 LBS | BODY MASS INDEX: 37.54 KG/M2 | HEIGHT: 57 IN | DIASTOLIC BLOOD PRESSURE: 100 MMHG | HEART RATE: 89 BPM | SYSTOLIC BLOOD PRESSURE: 148 MMHG

## 2024-10-30 DIAGNOSIS — J03.90 TONSILLITIS: Primary | ICD-10-CM

## 2024-10-30 DIAGNOSIS — J03.90 TONSILLITIS: ICD-10-CM

## 2024-10-30 PROCEDURE — 99214 OFFICE O/P EST MOD 30 MIN: CPT | Performed by: NURSE PRACTITIONER

## 2024-10-30 RX ORDER — PREDNISONE 20 MG/1
20 TABLET ORAL 2 TIMES DAILY
Qty: 10 TABLET | Refills: 0 | Status: SHIPPED | OUTPATIENT
Start: 2024-10-30 | End: 2024-11-04

## 2024-10-30 ASSESSMENT — PATIENT HEALTH QUESTIONNAIRE - PHQ9
SUM OF ALL RESPONSES TO PHQ QUESTIONS 1-9: 5
SUM OF ALL RESPONSES TO PHQ QUESTIONS 1-9: 5
5. POOR APPETITE OR OVEREATING: MORE THAN HALF THE DAYS
6. FEELING BAD ABOUT YOURSELF - OR THAT YOU ARE A FAILURE OR HAVE LET YOURSELF OR YOUR FAMILY DOWN: NOT AT ALL
1. LITTLE INTEREST OR PLEASURE IN DOING THINGS: NOT AT ALL
9. THOUGHTS THAT YOU WOULD BE BETTER OFF DEAD, OR OF HURTING YOURSELF: NOT AT ALL
SUM OF ALL RESPONSES TO PHQ QUESTIONS 1-9: 5
8. MOVING OR SPEAKING SO SLOWLY THAT OTHER PEOPLE COULD HAVE NOTICED. OR THE OPPOSITE, BEING SO FIGETY OR RESTLESS THAT YOU HAVE BEEN MOVING AROUND A LOT MORE THAN USUAL: NOT AT ALL
10. IF YOU CHECKED OFF ANY PROBLEMS, HOW DIFFICULT HAVE THESE PROBLEMS MADE IT FOR YOU TO DO YOUR WORK, TAKE CARE OF THINGS AT HOME, OR GET ALONG WITH OTHER PEOPLE: NOT DIFFICULT AT ALL
3. TROUBLE FALLING OR STAYING ASLEEP: NOT AT ALL
2. FEELING DOWN, DEPRESSED OR HOPELESS: NOT AT ALL
7. TROUBLE CONCENTRATING ON THINGS, SUCH AS READING THE NEWSPAPER OR WATCHING TELEVISION: NOT AT ALL
SUM OF ALL RESPONSES TO PHQ QUESTIONS 1-9: 5
4. FEELING TIRED OR HAVING LITTLE ENERGY: NEARLY EVERY DAY
SUM OF ALL RESPONSES TO PHQ9 QUESTIONS 1 & 2: 0

## 2024-10-30 ASSESSMENT — ENCOUNTER SYMPTOMS
ABDOMINAL PAIN: 1
CHEST TIGHTNESS: 0
NAUSEA: 1
SWOLLEN GLANDS: 1
SORE THROAT: 1
SHORTNESS OF BREATH: 0
COUGH: 1
COLOR CHANGE: 0

## 2024-10-30 NOTE — PROGRESS NOTES
Siddharth Fermin is a 29 y.o. female who presents in office today with Self follow up on recent condition of     Chief Complaint   Patient presents with    Pharyngitis     [facility] 10/19 and 10/26/24 still has sore throat, and fatigue, other symptoms have gone away. Has tried atb's and steroid pack and still no better. IBU helps some.         History of Present Illness:     Two weeks ago starting feeling achy, fatigued, feverish and developed sore throat. Tested negative for flu, RSV, COVID. Seen at [facility] 10/19/24 and again 10/26/24. Tested negative for strep and mono. Reports was advised she may have abscess. Prescribed azithromycin on 10/21/24 with no improvement and prednisone on 10/26/24 with some improvement. Also received order for cefdinir, but was advised by another provider not to . Has noticed when prednisone \"wears off,\" discomfort returns. Complaints of pain with swallowing and difficulty breathing due to throat swelling. Thinks she may have observed tonsil stone, for which she tried salt water gargles with mild improvement.     Pharyngitis  This is a new problem. The current episode started 1 to 4 weeks ago. The problem occurs constantly. The problem has been gradually worsening. Associated symptoms include abdominal pain, chills, congestion, coughing, fatigue, a fever, headaches, nausea, neck pain, a sore throat and swollen glands. Pertinent negatives include no arthralgias, chest pain or rash. The symptoms are aggravated by swallowing and eating. She has tried NSAIDs and acetaminophen (Azithromycin and Prednisone) for the symptoms. The treatment provided mild relief.     Blood pressure elevated again today - 153/101 - today attributes to pain. At previous office visit 10/16/24 was 149/98 and 154/102, which she attributed to headache.       Care gaps:   Colon CA screening:   n/a  Vaccines:   Hepatitis C/HIV screens:

## 2024-11-01 LAB
MICROORGANISM SPEC CULT: NORMAL
SERVICE CMNT-IMP: NORMAL
SPECIMEN DESCRIPTION: NORMAL

## 2024-12-18 ENCOUNTER — OFFICE VISIT (OUTPATIENT)
Dept: BARIATRICS/WEIGHT MGMT | Age: 29
End: 2024-12-18
Payer: COMMERCIAL

## 2024-12-18 VITALS
HEIGHT: 57 IN | HEART RATE: 90 BPM | BODY MASS INDEX: 36.24 KG/M2 | WEIGHT: 168 LBS | SYSTOLIC BLOOD PRESSURE: 130 MMHG | DIASTOLIC BLOOD PRESSURE: 88 MMHG

## 2024-12-18 DIAGNOSIS — K80.10 CHRONIC CHOLECYSTITIS WITH CALCULUS: ICD-10-CM

## 2024-12-18 DIAGNOSIS — K90.89 OTHER SPECIFIED INTESTINAL MALABSORPTION: Primary | ICD-10-CM

## 2024-12-18 DIAGNOSIS — Z98.84 S/P LAPAROSCOPIC SLEEVE GASTRECTOMY: ICD-10-CM

## 2024-12-18 PROCEDURE — 99214 OFFICE O/P EST MOD 30 MIN: CPT | Performed by: SURGERY

## 2024-12-18 NOTE — PROGRESS NOTES
[facility] INVASIVE [facility]IATRIC SURG  [address]  [address]  [place] [id number]  Dept: [phone]    SURGICAL WEIGHT LOSS MANAGEMENT PROGRAM  PROGRESS NOTE     CC: Weight Loss    Patient: Siddharth Fermin      Service Date: 2024  Visit:   2 years    Medical Record #: [id number]  Date of Surgery: 22      History: 29 y.o. female who presents today for a post op follow up for sleeve gastrectomy. She has some irritation after eating with nausea but it is not regular. She is not planning on another pregnancy but notes it may happen. Her  was 24. Not completed bariatric labs recently. Not taking bariatric vitamins. She was taking pre-isak but stopped all her medication when she started breast feeding. Patient reports regular bowel movements. She denies vomiting, fever, and chills. She states the pain is well controlled.    Height: 1.448 m (4' 9\")  Highest Weight: 254  lbs    Current Visit Weight Information  Weight: 76.2 kg (168 lb)   BMI: Body mass index is 36.35 kg/m².    Weight loss since surgery:  86 lbs    1 wk - D/C'd home on VTE Prohylaxis?   [x] Yes   [] No   On VTE Proph as directed?   [x] Yes   [] No    Liver pathology:    [] NA    [] No Gross path    [] Liver Steatosis   [x] Discussed w/ pt   [] Referred to GI    Exercising?   [x] Yes    [] No     Prior imaging/testing:  Patient had US abdomen limited on 24, which showed: \"Cholelithiasis with some gallbladder wall thickening and pericholecystic fluid.  Findings suggest the possibility of acute cholecystitis.\"    Patient had MRI abdomen w/o contrast MRCP on 24, which showed:  1. Cholelithiasis with mild gallbladder wall thickening or pericholecystic  fluid, remaining suspicious for acute cholecystitis in the appropriate  clinical setting.    Review of Systems:     General  Negative for: [] Weight Change   [x] Fatigue   [x] Fevers & Chills    [] Appetite change

## 2025-01-29 ENCOUNTER — OFFICE VISIT (OUTPATIENT)
Dept: FAMILY MEDICINE CLINIC | Age: 30
End: 2025-01-29
Payer: COMMERCIAL

## 2025-01-29 ENCOUNTER — HOSPITAL ENCOUNTER (OUTPATIENT)
Age: 30
Setting detail: SPECIMEN
Discharge: HOME | End: 2025-01-29

## 2025-01-29 VITALS
HEART RATE: 88 BPM | HEIGHT: 57 IN | SYSTOLIC BLOOD PRESSURE: 127 MMHG | DIASTOLIC BLOOD PRESSURE: 78 MMHG | BODY MASS INDEX: 36.68 KG/M2 | WEIGHT: 170 LBS

## 2025-01-29 DIAGNOSIS — K90.89 OTHER SPECIFIED INTESTINAL MALABSORPTION: ICD-10-CM

## 2025-01-29 DIAGNOSIS — F33.1 MODERATE EPISODE OF RECURRENT MAJOR DEPRESSIVE DISORDER (HCC): Primary | ICD-10-CM

## 2025-01-29 DIAGNOSIS — F90.0 ATTENTION DEFICIT HYPERACTIVITY DISORDER (ADHD), PREDOMINANTLY INATTENTIVE TYPE: ICD-10-CM

## 2025-01-29 DIAGNOSIS — R00.2 PALPITATIONS: ICD-10-CM

## 2025-01-29 DIAGNOSIS — I10 PRIMARY HYPERTENSION: ICD-10-CM

## 2025-01-29 DIAGNOSIS — Z98.84 S/P LAPAROSCOPIC SLEEVE GASTRECTOMY: ICD-10-CM

## 2025-01-29 LAB
ALBUMIN SERPL-MCNC: 4.4 G/DL (ref 3.5–5.2)
ALBUMIN/GLOB SERPL: 1.6 {RATIO} (ref 1–2.5)
ALP SERPL-CCNC: 80 U/L (ref 35–104)
ALT SERPL-CCNC: 21 U/L (ref 10–35)
ANION GAP SERPL CALCULATED.3IONS-SCNC: 13 MMOL/L (ref 9–16)
AST SERPL-CCNC: 23 U/L (ref 10–35)
BASOPHILS # BLD: <0.03 K/UL (ref 0–0.2)
BASOPHILS NFR BLD: 0 % (ref 0–2)
BILIRUB SERPL-MCNC: 1.1 MG/DL (ref 0–1.2)
BUN SERPL-MCNC: 14 MG/DL (ref 6–20)
CALCIUM SERPL-MCNC: 9.2 MG/DL (ref 8.6–10.4)
CHLORIDE SERPL-SCNC: 104 MMOL/L (ref 98–107)
CHOLEST SERPL-MCNC: 133 MG/DL (ref 0–199)
CHOLESTEROL/HDL RATIO: 2.6
CO2 SERPL-SCNC: 23 MMOL/L (ref 20–31)
CREAT SERPL-MCNC: 0.6 MG/DL (ref 0.6–0.9)
EOSINOPHIL # BLD: 0.04 K/UL (ref 0–0.44)
EOSINOPHILS RELATIVE PERCENT: 1 % (ref 1–4)
ERYTHROCYTE [DISTWIDTH] IN BLOOD BY AUTOMATED COUNT: 14.4 % (ref 11.8–14.4)
EST. AVERAGE GLUCOSE BLD GHB EST-MCNC: 91 MG/DL
FERRITIN SERPL-MCNC: 5 NG/ML (ref 15–150)
FOLATE SERPL-MCNC: 10 NG/ML (ref 4.8–24.2)
GFR, ESTIMATED: >90 ML/MIN/1.73M2
GLUCOSE SERPL-MCNC: 77 MG/DL (ref 74–99)
HBA1C MFR BLD: 4.8 % (ref 4–6)
HCT VFR BLD AUTO: 35.5 % (ref 36.3–47.1)
HDLC SERPL-MCNC: 52 MG/DL
HGB BLD-MCNC: 10.7 G/DL (ref 11.9–15.1)
IMM GRANULOCYTES # BLD AUTO: <0.03 K/UL (ref 0–0.3)
IMM GRANULOCYTES NFR BLD: 0 %
IRON SATN MFR SERPL: 6 % (ref 20–55)
IRON SERPL-MCNC: 28 UG/DL (ref 37–145)
LDLC SERPL CALC-MCNC: 73 MG/DL (ref 0–100)
LYMPHOCYTES NFR BLD: 1.77 K/UL (ref 1.1–3.7)
LYMPHOCYTES RELATIVE PERCENT: 32 % (ref 24–43)
MAGNESIUM SERPL-MCNC: 2 MG/DL (ref 1.6–2.6)
MCH RBC QN AUTO: 22 PG (ref 25.2–33.5)
MCHC RBC AUTO-ENTMCNC: 30.1 G/DL (ref 28.4–34.8)
MCV RBC AUTO: 72.9 FL (ref 82.6–102.9)
MONOCYTES NFR BLD: 0.45 K/UL (ref 0.1–1.2)
MONOCYTES NFR BLD: 8 % (ref 3–12)
NEUTROPHILS NFR BLD: 59 % (ref 36–65)
NEUTS SEG NFR BLD: 3.22 K/UL (ref 1.5–8.1)
NRBC BLD-RTO: 0 PER 100 WBC
PLATELET # BLD AUTO: 247 K/UL (ref 138–453)
PMV BLD AUTO: 10.1 FL (ref 8.1–13.5)
POTASSIUM SERPL-SCNC: 4.2 MMOL/L (ref 3.7–5.3)
PROT SERPL-MCNC: 7.2 G/DL (ref 6.6–8.7)
PTH-INTACT SERPL-MCNC: 46 PG/ML (ref 15–65)
RBC # BLD AUTO: 4.87 M/UL (ref 3.95–5.11)
RBC # BLD: ABNORMAL 10*6/UL
SODIUM SERPL-SCNC: 140 MMOL/L (ref 136–145)
TIBC SERPL-MCNC: 467 UG/DL (ref 250–450)
TRIGL SERPL-MCNC: 41 MG/DL
TSH SERPL DL<=0.05 MIU/L-ACNC: 1.71 UIU/ML (ref 0.27–4.2)
UNSATURATED IRON BINDING CAPACITY: 439 UG/DL (ref 112–347)
VIT B12 SERPL-MCNC: 341 PG/ML (ref 232–1245)
VLDLC SERPL CALC-MCNC: 8 MG/DL (ref 1–30)
WBC OTHER # BLD: 5.5 K/UL (ref 3.5–11.3)

## 2025-01-29 PROCEDURE — 99214 OFFICE O/P EST MOD 30 MIN: CPT | Performed by: NURSE PRACTITIONER

## 2025-01-29 PROCEDURE — 3078F DIAST BP <80 MM HG: CPT | Performed by: NURSE PRACTITIONER

## 2025-01-29 PROCEDURE — 3074F SYST BP LT 130 MM HG: CPT | Performed by: NURSE PRACTITIONER

## 2025-01-29 RX ORDER — BUPROPION HYDROCHLORIDE 150 MG/1
150 TABLET ORAL EVERY MORNING
Qty: 60 TABLET | Refills: 3 | Status: SHIPPED | OUTPATIENT
Start: 2025-01-29

## 2025-01-29 RX ORDER — VALSARTAN 40 MG/1
40 TABLET ORAL DAILY
Qty: 30 TABLET | Refills: 3 | Status: SHIPPED | OUTPATIENT
Start: 2025-01-29

## 2025-01-29 SDOH — ECONOMIC STABILITY: FOOD INSECURITY: WITHIN THE PAST 12 MONTHS, THE FOOD YOU BOUGHT JUST DIDN'T LAST AND YOU DIDN'T HAVE MONEY TO GET MORE.: NEVER TRUE

## 2025-01-29 SDOH — ECONOMIC STABILITY: FOOD INSECURITY: WITHIN THE PAST 12 MONTHS, YOU WORRIED THAT YOUR FOOD WOULD RUN OUT BEFORE YOU GOT MONEY TO BUY MORE.: NEVER TRUE

## 2025-01-29 ASSESSMENT — PATIENT HEALTH QUESTIONNAIRE - PHQ9
7. TROUBLE CONCENTRATING ON THINGS, SUCH AS READING THE NEWSPAPER OR WATCHING TELEVISION: MORE THAN HALF THE DAYS
8. MOVING OR SPEAKING SO SLOWLY THAT OTHER PEOPLE COULD HAVE NOTICED. OR THE OPPOSITE, BEING SO FIGETY OR RESTLESS THAT YOU HAVE BEEN MOVING AROUND A LOT MORE THAN USUAL: NOT AT ALL
SUM OF ALL RESPONSES TO PHQ QUESTIONS 1-9: 9
6. FEELING BAD ABOUT YOURSELF - OR THAT YOU ARE A FAILURE OR HAVE LET YOURSELF OR YOUR FAMILY DOWN: SEVERAL DAYS
10. IF YOU CHECKED OFF ANY PROBLEMS, HOW DIFFICULT HAVE THESE PROBLEMS MADE IT FOR YOU TO DO YOUR WORK, TAKE CARE OF THINGS AT HOME, OR GET ALONG WITH OTHER PEOPLE: SOMEWHAT DIFFICULT
SUM OF ALL RESPONSES TO PHQ QUESTIONS 1-9: 9
SUM OF ALL RESPONSES TO PHQ9 QUESTIONS 1 & 2: 1
SUM OF ALL RESPONSES TO PHQ QUESTIONS 1-9: 9
4. FEELING TIRED OR HAVING LITTLE ENERGY: MORE THAN HALF THE DAYS
9. THOUGHTS THAT YOU WOULD BE BETTER OFF DEAD, OR OF HURTING YOURSELF: NOT AT ALL
2. FEELING DOWN, DEPRESSED OR HOPELESS: NOT AT ALL
3. TROUBLE FALLING OR STAYING ASLEEP: SEVERAL DAYS
1. LITTLE INTEREST OR PLEASURE IN DOING THINGS: SEVERAL DAYS
SUM OF ALL RESPONSES TO PHQ QUESTIONS 1-9: 9
5. POOR APPETITE OR OVEREATING: MORE THAN HALF THE DAYS

## 2025-01-29 ASSESSMENT — ENCOUNTER SYMPTOMS
COLOR CHANGE: 0
CONSTIPATION: 1
NAUSEA: 0
CHEST TIGHTNESS: 0
ABDOMINAL PAIN: 1
SHORTNESS OF BREATH: 0
DIARRHEA: 0

## 2025-01-29 NOTE — PROGRESS NOTES
waking.    BP today 134/92, repeat 127/78. She has a history of hypertension, which was not diagnosed until late in her pregnancy, and suspects that her blood pressure may still be elevated. She has not been monitoring her blood pressure at home. Coworker checked the other day and was ~140/90. She experiences occasional headaches, which she attributes to neck and shoulder pain.  She was prescribed antihypertensive during and immediately after pregnancy but is no longer taking.    She has been diagnosed with depression for the past 3 to 4 years and has been struggling with motivation, often feeling hopeless and reluctant to leave her bed. She has been seeing a therapist weekly for the past 4 weeks, initially for postpartum depression, and is considering resuming medication. She has a history of anxiety, which was previously managed with Celexa, but she discontinued this medication due to side effects. She is concerned about potential weight gain associated with medication.     She has a long-standing diagnosis of ADHD, dating back to her childhood, but discontinued medication at the age of 16. Was on Vyvanse in the past, last time in [employer] years.  She is currently returning to school and is struggling with focus and reading, which she believes may be related to her depression or ADHD.  She is also struggling with task completion.    Supplemental Information  She underwent bariatric surgery on 2023 and is scheduled for gallbladder surgery in either 2025 or 2025. She experienced gallbladder attacks during her pregnancy and continues to feel unwell after eating, often requiring rest.    MEDICATIONS  Past: Celexa        Care gaps:   Colon CA screening:   n/a  Vaccines:   Hepatitis C/HIV screens:    Hep C neg 2024  Breast CA screening:   n/a  OB/GYN, cervical CA screenin22 negative, Dr Lechuga  Depression Screenin --> 4 --> 9    Health Maintenance Due   Topic Date Due    HIV screen

## 2025-02-01 LAB
VIT B1 PYROPHOSHATE BLD-SCNC: 116 NMOL/L (ref 70–180)
ZINC SERPL-MCNC: 98.3 UG/DL (ref 60–120)

## 2025-02-02 LAB
RETINYL PALMITATE: <0.02 MG/L (ref 0–0.1)
VITAMIN A LEVEL: 0.54 MG/L (ref 0.3–1.2)
VITAMIN A, INTERP: NORMAL

## 2025-03-07 DIAGNOSIS — E61.1 IRON DEFICIENCY: Primary | ICD-10-CM

## 2025-03-17 ENCOUNTER — TELEPHONE (OUTPATIENT)
Dept: BARIATRICS/WEIGHT MGMT | Age: 30
End: 2025-03-17

## 2025-03-17 NOTE — TELEPHONE ENCOUNTER
Calling to speak with sandy to schedule surgery for gallbladder. Patient is requesting to please look at June 26th for possible surgery date.     Please return call: [phone]

## 2025-03-19 ENCOUNTER — INITIAL CONSULT (OUTPATIENT)
Dept: ONCOLOGY | Age: 30
End: 2025-03-19
Payer: COMMERCIAL

## 2025-03-19 ENCOUNTER — TELEPHONE (OUTPATIENT)
Dept: ONCOLOGY | Age: 30
End: 2025-03-19

## 2025-03-19 ENCOUNTER — OFFICE VISIT (OUTPATIENT)
Dept: FAMILY MEDICINE CLINIC | Age: 30
End: 2025-03-19
Payer: COMMERCIAL

## 2025-03-19 VITALS
BODY MASS INDEX: 32.83 KG/M2 | HEIGHT: 61 IN | WEIGHT: 173.9 LBS | SYSTOLIC BLOOD PRESSURE: 126 MMHG | RESPIRATION RATE: 16 BRPM | TEMPERATURE: 97.9 F | HEART RATE: 96 BPM | DIASTOLIC BLOOD PRESSURE: 88 MMHG

## 2025-03-19 VITALS
WEIGHT: 173 LBS | HEART RATE: 78 BPM | DIASTOLIC BLOOD PRESSURE: 92 MMHG | HEIGHT: 61 IN | SYSTOLIC BLOOD PRESSURE: 133 MMHG | BODY MASS INDEX: 32.66 KG/M2

## 2025-03-19 DIAGNOSIS — Z98.84 S/P LAPAROSCOPIC SLEEVE GASTRECTOMY: ICD-10-CM

## 2025-03-19 DIAGNOSIS — D50.0 IRON DEFICIENCY ANEMIA DUE TO CHRONIC BLOOD LOSS: Primary | ICD-10-CM

## 2025-03-19 DIAGNOSIS — N94.6 DYSMENORRHEA: ICD-10-CM

## 2025-03-19 PROCEDURE — 99214 OFFICE O/P EST MOD 30 MIN: CPT | Performed by: NURSE PRACTITIONER

## 2025-03-19 PROCEDURE — 99202 OFFICE O/P NEW SF 15 MIN: CPT | Performed by: INTERNAL MEDICINE

## 2025-03-19 RX ORDER — HYDROCORTISONE SODIUM SUCCINATE 100 MG/2ML
100 INJECTION INTRAMUSCULAR; INTRAVENOUS
OUTPATIENT
Start: 2025-03-19

## 2025-03-19 RX ORDER — SODIUM CHLORIDE 0.9 % (FLUSH) 0.9 %
5-40 SYRINGE (ML) INJECTION PRN
OUTPATIENT
Start: 2025-03-19

## 2025-03-19 RX ORDER — SODIUM CHLORIDE 9 MG/ML
5-250 INJECTION, SOLUTION INTRAVENOUS PRN
OUTPATIENT
Start: 2025-03-19

## 2025-03-19 RX ORDER — FAMOTIDINE 10 MG/ML
20 INJECTION, SOLUTION INTRAVENOUS
OUTPATIENT
Start: 2025-03-19

## 2025-03-19 RX ORDER — DULOXETIN HYDROCHLORIDE 30 MG/1
30 CAPSULE, DELAYED RELEASE ORAL DAILY
Qty: 30 CAPSULE | Refills: 1 | Status: SHIPPED | OUTPATIENT
Start: 2025-03-19

## 2025-03-19 RX ORDER — HEPARIN SODIUM (PORCINE) LOCK FLUSH IV SOLN 100 UNIT/ML 100 UNIT/ML
500 SOLUTION INTRAVENOUS PRN
OUTPATIENT
Start: 2025-03-19

## 2025-03-19 RX ORDER — ONDANSETRON 2 MG/ML
8 INJECTION INTRAMUSCULAR; INTRAVENOUS
OUTPATIENT
Start: 2025-03-19

## 2025-03-19 RX ORDER — DIPHENHYDRAMINE HYDROCHLORIDE 50 MG/ML
50 INJECTION, SOLUTION INTRAMUSCULAR; INTRAVENOUS
OUTPATIENT
Start: 2025-03-19

## 2025-03-19 RX ORDER — EPINEPHRINE 1 MG/ML
0.3 INJECTION, SOLUTION, CONCENTRATE INTRAVENOUS PRN
OUTPATIENT
Start: 2025-03-19

## 2025-03-19 RX ORDER — ALBUTEROL SULFATE 90 UG/1
4 INHALANT RESPIRATORY (INHALATION) PRN
OUTPATIENT
Start: 2025-03-19

## 2025-03-19 RX ORDER — ACETAMINOPHEN 325 MG/1
650 TABLET ORAL
OUTPATIENT
Start: 2025-03-19

## 2025-03-19 RX ORDER — SODIUM CHLORIDE 9 MG/ML
INJECTION, SOLUTION INTRAVENOUS CONTINUOUS
OUTPATIENT
Start: 2025-03-19

## 2025-03-19 ASSESSMENT — PATIENT HEALTH QUESTIONNAIRE - PHQ9
SUM OF ALL RESPONSES TO PHQ QUESTIONS 1-9: 10
SUM OF ALL RESPONSES TO PHQ QUESTIONS 1-9: 10
1. LITTLE INTEREST OR PLEASURE IN DOING THINGS: MORE THAN HALF THE DAYS
10. IF YOU CHECKED OFF ANY PROBLEMS, HOW DIFFICULT HAVE THESE PROBLEMS MADE IT FOR YOU TO DO YOUR WORK, TAKE CARE OF THINGS AT HOME, OR GET ALONG WITH OTHER PEOPLE: SOMEWHAT DIFFICULT
SUM OF ALL RESPONSES TO PHQ QUESTIONS 1-9: 10
5. POOR APPETITE OR OVEREATING: MORE THAN HALF THE DAYS
9. THOUGHTS THAT YOU WOULD BE BETTER OFF DEAD, OR OF HURTING YOURSELF: NOT AT ALL
7. TROUBLE CONCENTRATING ON THINGS, SUCH AS READING THE NEWSPAPER OR WATCHING TELEVISION: MORE THAN HALF THE DAYS
2. FEELING DOWN, DEPRESSED OR HOPELESS: SEVERAL DAYS
6. FEELING BAD ABOUT YOURSELF - OR THAT YOU ARE A FAILURE OR HAVE LET YOURSELF OR YOUR FAMILY DOWN: SEVERAL DAYS
8. MOVING OR SPEAKING SO SLOWLY THAT OTHER PEOPLE COULD HAVE NOTICED. OR THE OPPOSITE, BEING SO FIGETY OR RESTLESS THAT YOU HAVE BEEN MOVING AROUND A LOT MORE THAN USUAL: NOT AT ALL
SUM OF ALL RESPONSES TO PHQ QUESTIONS 1-9: 10
4. FEELING TIRED OR HAVING LITTLE ENERGY: NOT AT ALL
3. TROUBLE FALLING OR STAYING ASLEEP: MORE THAN HALF THE DAYS

## 2025-03-19 ASSESSMENT — ENCOUNTER SYMPTOMS
COLOR CHANGE: 0
CHEST TIGHTNESS: 0
SHORTNESS OF BREATH: 0
ABDOMINAL PAIN: 0
CONSTIPATION: 0
DIARRHEA: 0

## 2025-03-19 NOTE — PROGRESS NOTES
01/29/2025    BASOSABS <0.03 01/29/2025         Chemistry        Component Value Date/Time     01/29/2025 1500    K 4.2 01/29/2025 1500     01/29/2025 1500    CO2 23 01/29/2025 1500    BUN 14 01/29/2025 1500    CREATININE 0.6 01/29/2025 1500        Component Value Date/Time    CALCIUM 9.2 01/29/2025 1500    ALKPHOS 80 01/29/2025 1500    AST 23 01/29/2025 1500    ALT 21 01/29/2025 1500    BILITOT 1.1 01/29/2025 1500        Lab Results   Component Value Date    IRON 28 (L) 01/29/2025    TIBC 467 (H) 01/29/2025    FERRITIN 5 (L) 01/29/2025        PATHOLOGY DATA:   Reviewed  IMAGING DATA:      MRI ABDOMEN WO CONTRAST MRCP  Narrative: EXAMINATION:  MRI OF THE ABDOMEN WITHOUT CONTRAST AND MRCP    6/21/2024 1:26 pm    TECHNIQUE:  Multiplanar multisequence MRI of the abdomen was performed without the  administration of intravenous contrast.  After initial T2 axial and coronal  images, thick slab, thin slab and 3D coronal MRCP sequences were obtained  without the administration of intravenous contrast.  MIP images are provided  for review.  Images are moderately degraded due to motion artifact.    COMPARISON:  Ultrasound 01/20/2024    HISTORY:  ORDERING SYSTEM PROVIDED HISTORY:  Cholecystitis  TECHNOLOGIST PROVIDED HISTORY:  Cholecystitis  What is the sedation requirement?   None  Reason for Exam: Cholecystitis    FINDINGS:  Exam is not tailored to evaluate the fetus.    Gallbladder: There is mild gallbladder wall thickening with cholelithiasis.  There is pericholecystic fluid.    Bile Ducts: No intra or extrahepatic biliary duct dilation.  The common bile  duct is within normal limits.  No obvious choledocholithiasis is identified  however MRCP images are degraded due to motion artifact.    Pancreatic Duct: Normal caliber.    Other:  The spleen is borderline enlarged measuring up to 13 cm in  craniocaudal dimension.  There is mild nonspecific periportal edema.  Limited  evaluation of the unenhanced liver, spleen

## 2025-03-19 NOTE — TELEPHONE ENCOUNTER
JOVANNA HERE FOR CONSULTATION  Venofer asap  RTc in 8 weeks w labs prior  NEW ORDER IS PENDING PRECERT  LAB ORDERS GIVEN TO PT  MD VISIT 5/21/25 @ 1PM  AVS PRINTED W/ INSTRUCTIONS AND GIVEN TO PT ON EXIT

## 2025-03-19 NOTE — PROGRESS NOTES
technology. The patient (or guardian, if applicable) and other individuals in attendance at the appointment consented to the use of AI, including the recording.        Electronically signed by TRAMAINE MONTERO CNP on 3/19/2025 at 12:54 PM    Note is dictated utilizing voice recognition software. Unfortunately this leads to occasional typographical errors. Please contact our office if you have any questions.

## 2025-03-20 ENCOUNTER — TELEPHONE (OUTPATIENT)
Dept: ONCOLOGY | Age: 30
End: 2025-03-20

## 2025-03-20 NOTE — TELEPHONE ENCOUNTER
I TRIED TO CALL LORETO TO SCHEDULE HER IRON INFUSIONS AND HAD TO LEAVE A MESSAGE TO CALL THE OFFICE TO SCHEDULE.

## 2025-03-21 ENCOUNTER — PREP FOR PROCEDURE (OUTPATIENT)
Dept: BARIATRICS/WEIGHT MGMT | Age: 30
End: 2025-03-21

## 2025-03-21 DIAGNOSIS — K80.20 GALLSTONES: ICD-10-CM

## 2025-03-26 ENCOUNTER — HOSPITAL ENCOUNTER (OUTPATIENT)
Dept: INFUSION THERAPY | Facility: MEDICAL CENTER | Age: 30
Discharge: HOME | End: 2025-03-26
Payer: COMMERCIAL

## 2025-03-26 VITALS
HEART RATE: 81 BPM | DIASTOLIC BLOOD PRESSURE: 88 MMHG | RESPIRATION RATE: 18 BRPM | SYSTOLIC BLOOD PRESSURE: 132 MMHG | TEMPERATURE: 97.9 F

## 2025-03-26 DIAGNOSIS — D50.0 IRON DEFICIENCY ANEMIA DUE TO CHRONIC BLOOD LOSS: Primary | ICD-10-CM

## 2025-03-26 PROCEDURE — 96366 THER/PROPH/DIAG IV INF ADDON: CPT

## 2025-03-26 PROCEDURE — 6360000002 HC RX W HCPCS: Performed by: INTERNAL MEDICINE

## 2025-03-26 PROCEDURE — 2580000003 HC RX 258: Performed by: INTERNAL MEDICINE

## 2025-03-26 PROCEDURE — 96365 THER/PROPH/DIAG IV INF INIT: CPT

## 2025-03-26 RX ORDER — SODIUM CHLORIDE 9 MG/ML
5-250 INJECTION, SOLUTION INTRAVENOUS PRN
OUTPATIENT
Start: 2025-04-09

## 2025-03-26 RX ORDER — ONDANSETRON 2 MG/ML
8 INJECTION INTRAMUSCULAR; INTRAVENOUS
OUTPATIENT
Start: 2025-04-09

## 2025-03-26 RX ORDER — HYDROCORTISONE SODIUM SUCCINATE 100 MG/2ML
100 INJECTION INTRAMUSCULAR; INTRAVENOUS
OUTPATIENT
Start: 2025-04-09

## 2025-03-26 RX ORDER — SODIUM CHLORIDE 9 MG/ML
5-250 INJECTION, SOLUTION INTRAVENOUS PRN
Status: DISCONTINUED | OUTPATIENT
Start: 2025-03-26 | End: 2025-03-27 | Stop reason: HOSPADM

## 2025-03-26 RX ORDER — DIPHENHYDRAMINE HYDROCHLORIDE 50 MG/ML
50 INJECTION, SOLUTION INTRAMUSCULAR; INTRAVENOUS
OUTPATIENT
Start: 2025-04-09

## 2025-03-26 RX ORDER — ALBUTEROL SULFATE 90 UG/1
4 INHALANT RESPIRATORY (INHALATION) PRN
OUTPATIENT
Start: 2025-04-09

## 2025-03-26 RX ORDER — SODIUM CHLORIDE 9 MG/ML
INJECTION, SOLUTION INTRAVENOUS CONTINUOUS
OUTPATIENT
Start: 2025-04-09

## 2025-03-26 RX ORDER — SODIUM CHLORIDE 0.9 % (FLUSH) 0.9 %
5-40 SYRINGE (ML) INJECTION PRN
OUTPATIENT
Start: 2025-04-09

## 2025-03-26 RX ORDER — ACETAMINOPHEN 325 MG/1
650 TABLET ORAL
OUTPATIENT
Start: 2025-04-09

## 2025-03-26 RX ORDER — HEPARIN 100 UNIT/ML
500 SYRINGE INTRAVENOUS PRN
OUTPATIENT
Start: 2025-04-09

## 2025-03-26 RX ORDER — FAMOTIDINE 10 MG/ML
20 INJECTION, SOLUTION INTRAVENOUS
OUTPATIENT
Start: 2025-04-09

## 2025-03-26 RX ORDER — EPINEPHRINE 1 MG/ML
0.3 INJECTION, SOLUTION INTRAMUSCULAR; SUBCUTANEOUS PRN
OUTPATIENT
Start: 2025-04-09

## 2025-03-26 RX ADMIN — IRON SUCROSE 300 MG: 20 INJECTION, SOLUTION INTRAVENOUS at 14:21

## 2025-03-26 RX ADMIN — SODIUM CHLORIDE 20 ML/HR: 9 INJECTION, SOLUTION INTRAVENOUS at 14:20

## 2025-03-26 NOTE — PROGRESS NOTES
Patient arrived ambulatory for 1 of 3 Venofer infusion.  Denies any complaint or concern. Patient states she has not had any iron infusions in the past.  Vitals as charted.  Peripheral IV established per policy.  Patient educated on Venofer, verbalizes understanding of possible adverse reaction; denies questions.  Venofer infused with no sign of adverse reaction; line flushed for 30 minutes while monitoring patient.  Intact IV catheter removed with pressure dressing applied.  Patient discharged with knowledge of next appointment.

## 2025-04-08 ENCOUNTER — TELEPHONE (OUTPATIENT)
Dept: INFUSION THERAPY | Facility: MEDICAL CENTER | Age: 30
End: 2025-04-08

## 2025-04-09 ENCOUNTER — HOSPITAL ENCOUNTER (OUTPATIENT)
Dept: INFUSION THERAPY | Facility: MEDICAL CENTER | Age: 30
End: 2025-04-09

## 2025-04-09 ENCOUNTER — TELEPHONE (OUTPATIENT)
Dept: INFUSION THERAPY | Facility: MEDICAL CENTER | Age: 30
End: 2025-04-09

## 2025-04-09 NOTE — TELEPHONE ENCOUNTER
Patient spoke to front office to reschedule appointment due to not being able to make scheduled appointment today.

## 2025-04-22 ENCOUNTER — HOSPITAL ENCOUNTER (OUTPATIENT)
Dept: INFUSION THERAPY | Facility: MEDICAL CENTER | Age: 30
Discharge: HOME | End: 2025-04-22
Payer: COMMERCIAL

## 2025-04-22 VITALS
RESPIRATION RATE: 18 BRPM | SYSTOLIC BLOOD PRESSURE: 122 MMHG | TEMPERATURE: 97.9 F | HEART RATE: 75 BPM | DIASTOLIC BLOOD PRESSURE: 67 MMHG

## 2025-04-22 DIAGNOSIS — D50.0 IRON DEFICIENCY ANEMIA DUE TO CHRONIC BLOOD LOSS: Primary | ICD-10-CM

## 2025-04-22 PROCEDURE — 6360000002 HC RX W HCPCS: Performed by: INTERNAL MEDICINE

## 2025-04-22 PROCEDURE — 2580000003 HC RX 258: Performed by: INTERNAL MEDICINE

## 2025-04-22 PROCEDURE — 96365 THER/PROPH/DIAG IV INF INIT: CPT

## 2025-04-22 RX ORDER — SODIUM CHLORIDE 9 MG/ML
INJECTION, SOLUTION INTRAVENOUS CONTINUOUS
OUTPATIENT
Start: 2025-04-23

## 2025-04-22 RX ORDER — SODIUM CHLORIDE 9 MG/ML
5-250 INJECTION, SOLUTION INTRAVENOUS PRN
Status: DISCONTINUED | OUTPATIENT
Start: 2025-04-22 | End: 2025-04-23 | Stop reason: HOSPADM

## 2025-04-22 RX ORDER — HYDROCORTISONE SODIUM SUCCINATE 100 MG/2ML
100 INJECTION INTRAMUSCULAR; INTRAVENOUS
OUTPATIENT
Start: 2025-04-23

## 2025-04-22 RX ORDER — EPINEPHRINE 1 MG/ML
0.3 INJECTION, SOLUTION INTRAMUSCULAR; SUBCUTANEOUS PRN
OUTPATIENT
Start: 2025-04-23

## 2025-04-22 RX ORDER — ALBUTEROL SULFATE 90 UG/1
4 INHALANT RESPIRATORY (INHALATION) PRN
OUTPATIENT
Start: 2025-04-23

## 2025-04-22 RX ORDER — SODIUM CHLORIDE 0.9 % (FLUSH) 0.9 %
5-40 SYRINGE (ML) INJECTION PRN
OUTPATIENT
Start: 2025-04-23

## 2025-04-22 RX ORDER — SODIUM CHLORIDE 9 MG/ML
5-250 INJECTION, SOLUTION INTRAVENOUS PRN
OUTPATIENT
Start: 2025-04-23

## 2025-04-22 RX ORDER — HEPARIN 100 UNIT/ML
500 SYRINGE INTRAVENOUS PRN
OUTPATIENT
Start: 2025-04-23

## 2025-04-22 RX ORDER — DIPHENHYDRAMINE HYDROCHLORIDE 50 MG/ML
50 INJECTION, SOLUTION INTRAMUSCULAR; INTRAVENOUS
OUTPATIENT
Start: 2025-04-23

## 2025-04-22 RX ORDER — ONDANSETRON 2 MG/ML
8 INJECTION INTRAMUSCULAR; INTRAVENOUS
OUTPATIENT
Start: 2025-04-23

## 2025-04-22 RX ORDER — FAMOTIDINE 10 MG/ML
20 INJECTION, SOLUTION INTRAVENOUS
OUTPATIENT
Start: 2025-04-23

## 2025-04-22 RX ORDER — ACETAMINOPHEN 325 MG/1
650 TABLET ORAL
OUTPATIENT
Start: 2025-04-23

## 2025-04-22 RX ADMIN — SODIUM CHLORIDE 50 ML/HR: 0.9 INJECTION, SOLUTION INTRAVENOUS at 09:16

## 2025-04-22 RX ADMIN — IRON SUCROSE 300 MG: 20 INJECTION, SOLUTION INTRAVENOUS at 09:37

## 2025-04-22 NOTE — PROGRESS NOTES
Pt arrives per amb per self and orders reviewed and NS flushing line before and after venofer and no reactions or complaints and blood return present throughout infusion and pt has next appt and pt discharged per amb per self.

## 2025-04-30 ENCOUNTER — HOSPITAL ENCOUNTER (OUTPATIENT)
Age: 30
Discharge: HOME | End: 2025-04-30

## 2025-04-30 LAB — HBV SURFACE AB SERPL IA-ACNC: <3.5 MIU/ML

## 2025-04-30 PROCEDURE — 86481 TB AG RESPONSE T-CELL SUSP: CPT

## 2025-04-30 PROCEDURE — 86317 IMMUNOASSAY INFECTIOUS AGENT: CPT

## 2025-04-30 PROCEDURE — 86787 VARICELLA-ZOSTER ANTIBODY: CPT

## 2025-04-30 PROCEDURE — 36415 COLL VENOUS BLD VENIPUNCTURE: CPT

## 2025-05-02 LAB — VZV IGG SER QL IA: 0.89

## 2025-05-03 LAB — T-SPOT TB TEST: NORMAL

## 2025-05-07 ENCOUNTER — TELEPHONE (OUTPATIENT)
Dept: FAMILY MEDICINE CLINIC | Age: 30
End: 2025-05-07

## 2025-05-07 NOTE — TELEPHONE ENCOUNTER
Patient  had labs done and is wanting to know if she should hold off on getting her varicella vaccine due to it being a weekend dose and  also her daughter is 10 mths .  Patient also inquired on having orders placed in chart for titers

## 2025-05-14 ENCOUNTER — TELEPHONE (OUTPATIENT)
Dept: BARIATRICS/WEIGHT MGMT | Age: 30
End: 2025-05-14

## 2025-05-14 NOTE — TELEPHONE ENCOUNTER
Patient called the office on 5/14/25 and needs to reschedule her 6/30/25 appointment.  Writer stated that a message will be sent to the  and she will receive a call back to reschedule.  Please advise

## 2025-07-29 ENCOUNTER — HOSPITAL ENCOUNTER (OUTPATIENT)
Facility: MEDICAL CENTER | Age: 30
End: 2025-07-29

## (undated) DEVICE — SUTURE VICRYL + SZ 0 L27IN ABSRB VLT L26MM UR-6 5/8 CIR VCP603H

## (undated) DEVICE — TOWEL,OR,DSP,ST,NATURAL,DLX,4/PK,20PK/CS: Brand: MEDLINE

## (undated) DEVICE — ARM DRAPE

## (undated) DEVICE — YANKAUER,FLEXIBLE HANDLE,REGLR CAPACITY: Brand: MEDLINE INDUSTRIES, INC.

## (undated) DEVICE — GLOVE ORANGE PI 7   MSG9070

## (undated) DEVICE — COVER LT HNDL BLU PLAS

## (undated) DEVICE — GARMENT,MEDLINE,DVT,INT,CALF,MED, GEN2: Brand: MEDLINE

## (undated) DEVICE — BASIC SINGLE BASIN 1-LF: Brand: MEDLINE INDUSTRIES, INC.

## (undated) DEVICE — TROCAR: Brand: KII FIOS FIRST ENTRY

## (undated) DEVICE — Device

## (undated) DEVICE — BASIN EMSIS 700ML GRAPHITE PLAS KID SHP GRAD

## (undated) DEVICE — Device: Brand: DEFENDO VALVE AND CONNECTOR KIT

## (undated) DEVICE — APPLICATOR MEDICATED 26 CC SOLUTION HI LT ORNG CHLORAPREP

## (undated) DEVICE — 450 ML BOTTLE OF 0.05% CHLORHEXIDINE GLUCONATE IN 99.95% STERILE WATER FOR IRRIGATION, USP AND APPLICATOR.: Brand: IRRISEPT ANTIMICROBIAL WOUND LAVAGE

## (undated) DEVICE — INSUFFLATION TUBING SET WITH FILTER, FUNNEL CONNECTOR AND LUER LOCK: Brand: JOSNOE MEDICAL INC

## (undated) DEVICE — Z DISCONTINUED NO SUB IEDED STAPLER SKIN L39MM DIA0.53MM CRWN 5.7MM S STL FIX HD PROX

## (undated) DEVICE — GLOVE ORANGE PI 7 1/2   MSG9075

## (undated) DEVICE — DRESSING TRNSPAR W4XL10IN FLM MIC POR SURESITE 123

## (undated) DEVICE — STAPLER 60 RELOAD GREEN: Brand: SUREFORM

## (undated) DEVICE — LAPAROSCOPIC SCISSORS: Brand: EPIX LAPAROSCOPIC SCISSORS

## (undated) DEVICE — COVER OR TBL W40XL90IN ABSRB STD AND GRIPPY BK SAHARA

## (undated) DEVICE — SUTURE VICRYL 3-0 L36IN ABSRB VLT CT-1 L36MM 1/2 CIR J344H

## (undated) DEVICE — TUBING, SUCTION, 3/16" X 10', STRAIGHT: Brand: MEDLINE

## (undated) DEVICE — ADHESIVE SKIN CLOSURE TOP 36 CC HI VISC DERMBND MINI

## (undated) DEVICE — Z DISCONTINUED USE 2220306 SUTURE VICRYL + SZ 3-0 L27IN ABSRB WHT CT-1 1/2 CIR VCP258H

## (undated) DEVICE — SUTURE VCRL SZ 0 L54IN ABSRB UD POLYGLACTIN 910 COAT BRAID J608H

## (undated) DEVICE — ELECTRODE PT RET AD L9FT HI MOIST COND ADH HYDRGEL CORDED

## (undated) DEVICE — SUTURE VICRYL + SZ 0 L27IN ABSRB UD CT-1 L36MM 1/2 CIR TAPR VCP260H

## (undated) DEVICE — SYRINGE MED 30ML STD CLR PLAS LUERLOCK TIP N CTRL DISP

## (undated) DEVICE — CONNECTOR TBNG AUX H2O JET DISP FOR OLY 160/180 SER

## (undated) DEVICE — SUTURE VICRYL SZ 0 L36IN ABSRB UD L36MM CT-1 1/2 CIR J946H

## (undated) DEVICE — STERILE POLYISOPRENE POWDER-FREE SURGICAL GLOVES WITH EMOLLIENT COATING: Brand: PROTEXIS

## (undated) DEVICE — STRAP,POSITIONING,KNEE/BODY,FOAM,4X60": Brand: MEDLINE

## (undated) DEVICE — KENDALL SCD EXPRESS SLEEVES, KNEE LENGTH, MEDIUM: Brand: KENDALL SCD

## (undated) DEVICE — STRAP ARMBRD W1.5XL32IN FOAM STR YET SFT W/ HK AND LOOP

## (undated) DEVICE — PREVENA INCISION MANAGEMENT SYSTEM- PEEL & PLACE DRESSING: Brand: PREVENA™ PEEL & PLACE™

## (undated) DEVICE — STAPLER 60: Brand: SUREFORM

## (undated) DEVICE — SEAL

## (undated) DEVICE — GLOVE SURG SZ 6 THK91MIL LTX FREE SYN POLYISOPRENE ANTI

## (undated) DEVICE — BINDER ABD 4 PANEL 82-100 IN 3XL 12 IN COTTON PROCARE

## (undated) DEVICE — 4-PORT MANIFOLD: Brand: NEPTUNE 2

## (undated) DEVICE — TRAY SPNL 24GA L4IN PENCAN PNCL PNT NDL 0.75% BIPIVCAIN W/

## (undated) DEVICE — REDUCER: Brand: ENDOWRIST

## (undated) DEVICE — LIQUIBAND RAPID ADHESIVE 36/CS 0.8ML: Brand: MEDLINE

## (undated) DEVICE — VESSEL SEALER EXTEND: Brand: ENDOWRIST

## (undated) DEVICE — Z DUP USE 2522782 SOLUTION IRRIG 1000ML STRL H2O PLAS CONTAINER UROMATIC

## (undated) DEVICE — GLOVE SURG SZ 65 THK91MIL LTX FREE SYN POLYISOPRENE

## (undated) DEVICE — TOWEL SURG W16XL26IN WHT NONFENESTRATED ST 2 PER PK

## (undated) DEVICE — BLADE ES ELASTOMERIC COAT INSUL DURABLE BEND UPTO 90DEG

## (undated) DEVICE — SUTURE ABSRB BRAID COAT VLT SH 3-0 27IN VCRL J311H

## (undated) DEVICE — SOLUTION ANTIFOG VIS SYS CLEARIFY LAPSCP

## (undated) DEVICE — PROTECTOR ULN NRV PUR FOAM HK LOOP STRP ANATOMICALLY

## (undated) DEVICE — CANNULA IV 18GA L15IN BLNT FILL LUERLOCK HUB MJCT

## (undated) DEVICE — BITEBLOCK 54FR W/ DENT RIM BLOX

## (undated) DEVICE — SUTURE ABSORBABLE MONOFILAMENT 0 TP1 60 IN VIO PDS + PDP991G

## (undated) DEVICE — SUTURE MONOCRYL + SZ 4-0 L18IN ABSRB UD L19MM PS-2 3/8 CIR MCP496G

## (undated) DEVICE — COVER,LIGHT HANDLE,FLX,2/PK: Brand: MEDLINE INDUSTRIES, INC.

## (undated) DEVICE — WOUND RETRACTOR AND PROTECTOR: Brand: ALEXIS O WOUND PROTECTOR-RETRACTOR

## (undated) DEVICE — YANKAUER,POOLE TIP,STERILE,50/CS: Brand: MEDLINE

## (undated) DEVICE — SUTURE MONOCRYL SZ 0 L36IN ABSRB VLT L48MM CTX 1/2 CIR Y398H

## (undated) DEVICE — SIMPLICITY FLUFF UNDERPAD 23X36, MODERATE: Brand: SIMPLICITY

## (undated) DEVICE — SUTURE MONOCRYL SZ 4-0 L27IN ABSRB UD L19MM PS-2 1/2 CIR PRIM Y426H

## (undated) DEVICE — NEEDLE SPINAL 22GA L3.5IN SPINOCAN

## (undated) DEVICE — CANNULA SEAL

## (undated) DEVICE — PERRYSBURG ENDO PACK: Brand: MEDLINE INDUSTRIES, INC.

## (undated) DEVICE — HYPODERMIC SAFETY NEEDLE: Brand: MAGELLAN

## (undated) DEVICE — BLADELESS OBTURATOR: Brand: WECK VISTA

## (undated) DEVICE — VISIGI 3D®  CALIBRATION SYSTEM  SIZE 36FR STD W/ BULB: Brand: BOEHRINGER® VISIGI 3D™ SLEEVE GASTRECTOMY CALIBRATION SYSTEM, SIZE 36FR W/BULB

## (undated) DEVICE — STAPLER 60 RELOAD BLUE: Brand: SUREFORM

## (undated) DEVICE — ADAPTER,CATHETER/SYRINGE/LUER,STERILE: Brand: MEDLINE

## (undated) DEVICE — SUTURE MCRYL SZ 4-0 L18IN ABSRB UD L16MM PC-3 3/8 CIR PRIM Y845G

## (undated) DEVICE — SOLUTION SCRB 4OZ 2% CHG FOR SURG SCRBBING HND WSH

## (undated) DEVICE — SOLUTION SOD CHL 0.9% 1000ML